# Patient Record
Sex: MALE | Race: WHITE | NOT HISPANIC OR LATINO
[De-identification: names, ages, dates, MRNs, and addresses within clinical notes are randomized per-mention and may not be internally consistent; named-entity substitution may affect disease eponyms.]

---

## 2017-07-31 ENCOUNTER — TRANSCRIPTION ENCOUNTER (OUTPATIENT)
Age: 52
End: 2017-07-31

## 2017-09-06 ENCOUNTER — EMERGENCY (EMERGENCY)
Facility: HOSPITAL | Age: 52
LOS: 0 days | Discharge: HOME | End: 2017-09-06
Admitting: INTERNAL MEDICINE

## 2017-09-06 DIAGNOSIS — S46.911A STRAIN OF UNSPECIFIED MUSCLE, FASCIA AND TENDON AT SHOULDER AND UPPER ARM LEVEL, RIGHT ARM, INITIAL ENCOUNTER: ICD-10-CM

## 2017-09-06 DIAGNOSIS — R42 DIZZINESS AND GIDDINESS: ICD-10-CM

## 2017-09-06 DIAGNOSIS — F41.9 ANXIETY DISORDER, UNSPECIFIED: ICD-10-CM

## 2017-09-06 DIAGNOSIS — I48.91 UNSPECIFIED ATRIAL FIBRILLATION: ICD-10-CM

## 2017-09-06 DIAGNOSIS — Z79.899 OTHER LONG TERM (CURRENT) DRUG THERAPY: ICD-10-CM

## 2017-09-06 DIAGNOSIS — G47.33 OBSTRUCTIVE SLEEP APNEA (ADULT) (PEDIATRIC): ICD-10-CM
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
## 2017-09-06 DIAGNOSIS — M25.511 PAIN IN RIGHT SHOULDER: ICD-10-CM
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
## 2017-09-06 DIAGNOSIS — Z87.891 PERSONAL HISTORY OF NICOTINE DEPENDENCE: ICD-10-CM
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
## 2018-02-24 ENCOUNTER — EMERGENCY (EMERGENCY)
Facility: HOSPITAL | Age: 53
LOS: 0 days | Discharge: HOME | End: 2018-02-24
Attending: EMERGENCY MEDICINE | Admitting: INTERNAL MEDICINE

## 2018-02-24 VITALS
RESPIRATION RATE: 18 BRPM | TEMPERATURE: 98 F | SYSTOLIC BLOOD PRESSURE: 144 MMHG | HEART RATE: 64 BPM | DIASTOLIC BLOOD PRESSURE: 74 MMHG | OXYGEN SATURATION: 98 %

## 2018-02-24 VITALS
TEMPERATURE: 98 F | HEART RATE: 72 BPM | OXYGEN SATURATION: 98 % | SYSTOLIC BLOOD PRESSURE: 146 MMHG | RESPIRATION RATE: 18 BRPM | DIASTOLIC BLOOD PRESSURE: 66 MMHG

## 2018-02-24 DIAGNOSIS — R55 SYNCOPE AND COLLAPSE: ICD-10-CM

## 2018-02-24 DIAGNOSIS — R42 DIZZINESS AND GIDDINESS: ICD-10-CM

## 2018-02-24 DIAGNOSIS — I48.91 UNSPECIFIED ATRIAL FIBRILLATION: ICD-10-CM

## 2018-02-24 DIAGNOSIS — G47.33 OBSTRUCTIVE SLEEP APNEA (ADULT) (PEDIATRIC): ICD-10-CM

## 2018-02-24 LAB
ALBUMIN SERPL ELPH-MCNC: 4.4 G/DL — SIGNIFICANT CHANGE UP (ref 3–5.5)
ALP SERPL-CCNC: 50 U/L — SIGNIFICANT CHANGE UP (ref 30–115)
ALT FLD-CCNC: 25 U/L — SIGNIFICANT CHANGE UP (ref 0–41)
ANION GAP SERPL CALC-SCNC: 7 MMOL/L — SIGNIFICANT CHANGE UP (ref 7–14)
APTT BLD: 26.3 SEC — LOW (ref 27–39.2)
AST SERPL-CCNC: 25 U/L — SIGNIFICANT CHANGE UP (ref 0–41)
BASOPHILS # BLD AUTO: 0.07 K/UL — SIGNIFICANT CHANGE UP (ref 0–0.2)
BASOPHILS NFR BLD AUTO: 0.6 % — SIGNIFICANT CHANGE UP (ref 0–1)
BILIRUB SERPL-MCNC: 0.8 MG/DL — SIGNIFICANT CHANGE UP (ref 0.2–1.2)
BUN SERPL-MCNC: 14 MG/DL — SIGNIFICANT CHANGE UP (ref 10–20)
CALCIUM SERPL-MCNC: 9.2 MG/DL — SIGNIFICANT CHANGE UP (ref 8.5–10.1)
CHLORIDE SERPL-SCNC: 105 MMOL/L — SIGNIFICANT CHANGE UP (ref 98–110)
CHOLEST SERPL-MCNC: 138 MG/DL — SIGNIFICANT CHANGE UP (ref 100–200)
CK MB BLD-MCNC: 3 % — SIGNIFICANT CHANGE UP (ref 0–4)
CK MB BLD-MCNC: 3 % — SIGNIFICANT CHANGE UP (ref 0–4)
CK MB CFR SERPL CALC: 6.3 NG/ML — SIGNIFICANT CHANGE UP (ref 0.6–6.3)
CK MB CFR SERPL CALC: 7.7 NG/ML — HIGH (ref 0.6–6.3)
CK SERPL-CCNC: 196 U/L — SIGNIFICANT CHANGE UP (ref 0–225)
CK SERPL-CCNC: 248 U/L — HIGH (ref 0–225)
CO2 SERPL-SCNC: 28 MMOL/L — SIGNIFICANT CHANGE UP (ref 17–32)
CREAT SERPL-MCNC: 0.8 MG/DL — SIGNIFICANT CHANGE UP (ref 0.7–1.5)
EOSINOPHIL # BLD AUTO: 0.15 K/UL — SIGNIFICANT CHANGE UP (ref 0–0.7)
EOSINOPHIL NFR BLD AUTO: 1.4 % — SIGNIFICANT CHANGE UP (ref 0–8)
GLUCOSE SERPL-MCNC: 116 MG/DL — HIGH (ref 70–110)
HCT VFR BLD CALC: 38.2 % — LOW (ref 42–52)
HDLC SERPL-MCNC: 28 MG/DL — LOW (ref 40–60)
HGB BLD-MCNC: 12.1 G/DL — LOW (ref 14–18)
IMM GRANULOCYTES NFR BLD AUTO: 0.6 % — HIGH (ref 0.1–0.3)
INR BLD: 1.09 RATIO — SIGNIFICANT CHANGE UP (ref 0.65–1.3)
LIPID PNL WITH DIRECT LDL SERPL: 102 MG/DL — HIGH (ref 50–100)
LYMPHOCYTES # BLD AUTO: 1.98 K/UL — SIGNIFICANT CHANGE UP (ref 1.2–3.4)
LYMPHOCYTES # BLD AUTO: 17.8 % — LOW (ref 20.5–51.1)
MAGNESIUM SERPL-MCNC: 2.2 MG/DL — SIGNIFICANT CHANGE UP (ref 1.8–2.4)
MCHC RBC-ENTMCNC: 20.3 PG — LOW (ref 27–31)
MCHC RBC-ENTMCNC: 31.7 G/DL — LOW (ref 32–37)
MCV RBC AUTO: 64.1 FL — LOW (ref 80–94)
MONOCYTES # BLD AUTO: 0.82 K/UL — HIGH (ref 0.1–0.6)
MONOCYTES NFR BLD AUTO: 7.4 % — SIGNIFICANT CHANGE UP (ref 1.7–9.3)
NEUTROPHILS # BLD AUTO: 8.02 K/UL — HIGH (ref 1.4–6.5)
NEUTROPHILS NFR BLD AUTO: 72.2 % — SIGNIFICANT CHANGE UP (ref 42.2–75.2)
PLATELET # BLD AUTO: 210 K/UL — SIGNIFICANT CHANGE UP (ref 130–400)
POTASSIUM SERPL-MCNC: 3.6 MMOL/L — SIGNIFICANT CHANGE UP (ref 3.5–5)
POTASSIUM SERPL-SCNC: 3.6 MMOL/L — SIGNIFICANT CHANGE UP (ref 3.5–5)
PROT SERPL-MCNC: 7.1 G/DL — SIGNIFICANT CHANGE UP (ref 6–8)
PROTHROM AB SERPL-ACNC: 11.8 SEC — SIGNIFICANT CHANGE UP (ref 9.95–12.87)
RBC # BLD: 5.96 M/UL — SIGNIFICANT CHANGE UP (ref 4.7–6.1)
RBC # FLD: 18.1 % — HIGH (ref 11.5–14.5)
SODIUM SERPL-SCNC: 140 MMOL/L — SIGNIFICANT CHANGE UP (ref 135–146)
TOTAL CHOLESTEROL/HDL RATIO MEASUREMENT: 4.9 RATIO — SIGNIFICANT CHANGE UP (ref 4–5.5)
TRIGL SERPL-MCNC: 89 MG/DL — SIGNIFICANT CHANGE UP (ref 40–150)
TROPONIN I SERPL-MCNC: <0.02 NG/ML — SIGNIFICANT CHANGE UP (ref 0–0.05)
TROPONIN I SERPL-MCNC: <0.02 NG/ML — SIGNIFICANT CHANGE UP (ref 0–0.05)
WBC # BLD: 11.11 K/UL — HIGH (ref 4.8–10.8)
WBC # FLD AUTO: 11.11 K/UL — HIGH (ref 4.8–10.8)

## 2018-02-24 NOTE — ED CDU PROVIDER DISPOSITION NOTE - CLINICAL COURSE
pt presented with syncope. Placed into observation for evaluation. While in observation pt was on continuous cardiac monitoring . Serial cardiac markers neg. Echo normal. Pt likely became orthostatic. Advised to follow up with Dr. Coulter. All reports reviewed with pt and given to pt. Hx of Thal trait and mild anemia. Pt advised of mild elevation of glucose. Will need wt loss and exercise. Pt will follow up pmd in one week.

## 2018-02-24 NOTE — ED ADULT NURSE REASSESSMENT NOTE - NS ED NURSE REASSESS COMMENT FT1
Received pt from main ED A/O times 4 Vs stable placed on cardiac monitor , denies no chest pain no SOB no headache or dizziness , ED attending made aware of pt status , on going nursing observation .
pt reassessed report filling slight better VS stable denies no chest pain no SOB no N.V ,ambulate steady , 2DECHO order is done ,pt is seen evaluate by ED attending clear to go home  ready to be D/C home with family members .
Patient placed in observation, denies any dizziness or symptoms at this time, states he feels tired. VSS. Will continue to monitor.

## 2018-02-24 NOTE — ED CDU PROVIDER INITIAL DAY NOTE - OBJECTIVE STATEMENT
51 y/o male with PMHX of Sleep Apnea on CPAP, A.Fib ( only on ASA 81mg), presenting for syncope. As per pt, had sexual intercourse, went up a light of stairs felt lightheaded and sat down. Pt states he doesn't remember. Syncope was witnessed by wife,  possible loss of consciousness for few seconds. Pt was in seated position, no head trauma.  Denies previous history of syncope, chest pain, SOB., visual changes

## 2018-02-24 NOTE — ED ADULT NURSE NOTE - CHPI ED SYMPTOMS NEG
no numbness/no pain/no chills/no decreased eating/drinking/no vomiting/no tingling/no nausea/no weakness/no fever

## 2018-02-24 NOTE — ED ADULT NURSE NOTE - OBJECTIVE STATEMENT
Pt AOx3, ambulatory, c/o dizziness after having sexual intercourse, pt denies LOC, syncope, no changes in denies taking any medications. No SOB, no acute distress noted.

## 2018-02-24 NOTE — ED PROVIDER NOTE - PHYSICAL EXAMINATION
Vital signs reviewed  GENERAL: Patient well appearing, NAD  HEAD: NCAT  ENT: MMM  RESPIRATORY: Normal respiratory effort. CTA B/L. No wheezing, rales, rhonchi  CARDIOVASCULAR: Regular rate and rhythm. Normal S1/S2. No murmurs, rubs or gallops.  ABDOMEN: Soft. Nondistended. Nontender. No guarding or rebound. No CVA tenderness.  MUSCULOSKELETAL/EXTREMITIES: Brisk cap refill. 2+ radial pulses. No leg edema.  SKIN:  Warm and dry.   NEURO: AAOx3. No gross FND. No ataxia.  PSYCHIATRIC: Cooperative. Affect appropriate. Insight and judgement normal. Memory intact. Thought normal.

## 2018-02-24 NOTE — ED PROVIDER NOTE - PROGRESS NOTE DETAILS
lis - After patient came back to ED, demanded that he be handed discharge papers so that he could go even before I could take history. Pt states he is only here because his wife called EMS and she is forcing him to see the doctor. Pt reluctant to give information and states he wants to be discharged. Pt was informed that he could either stay to be evaluated or leave AMA which he is unwilling to do. Currently willing to stay for workup of syncope. lis Ritter Pt signed out to Dr. Polanco. f/u cbc, reassess --> obs discussed labs and obs stay with pt, pt reluctant to stay in obs as he does not want to have a stress test that is not read by his primary cardiologist Dr. Dye but also does not want to sing out AMA and does not want admission.  After extensive discussion of risks and benefits, pt amenable to obs stay and eval including stress test. D/w pt that will consult Dr. Dye.  Will place in obs

## 2018-02-24 NOTE — ED PROVIDER NOTE - ATTENDING CONTRIBUTION TO CARE
pt s/p syncopal episode after intercourse. no cp, sob, leg pain, ab pain, n, v, sweats. no injury.  has no complaints in ED. pt in nad, ncat, perrl pink conj, neck sup, no bruits or thrills, ctab, s1s2, ab soft, nt. no LE edema or tenderness. will get ekg, labs, cxr, monitor.

## 2018-02-24 NOTE — ED PROVIDER NOTE - OBJECTIVE STATEMENT
51yo M with PMHx Afib (not on AC), p/w syncope. Pt states after having sexual intercourse, he climbed up a flight of stairs and felt lightheaded, sat down and had syncopal episode. Wife called EMS, pt remembers the lights and sirens. Denies shaking or seizure-like activity. Currently asymptomatic in the ED. Patient actually LWBS initially but returned to his bed after going outside to call wife, who refused to come pick him up until he saw a doctor. Denies fever, chills, headache, CP, SOB, cough, palpitations, nausea, vomiting, diarrhea, abd pain, leg swelling.

## 2018-02-24 NOTE — ED CDU PROVIDER INITIAL DAY NOTE - PMH
Atrial fibrillation, unspecified type    Sleep apnea with use of continuous positive airway pressure (CPAP)

## 2018-02-24 NOTE — ED PROVIDER NOTE - NS ED ROS FT
Constitutional: No fever, chills.  Eyes:  No visual changes  ENMT:  No neck pain  Cardiac:  No chest pain, palpitations  Respiratory:  No cough, SOB  GI:  No nausea, vomiting, diarrhea, abdominal pain.  MS:  No back pain.  Neuro: +lightheadedness. +syncope. No headache  Skin:  No skin rash  Endocrine: No history of thyroid disease or diabetes.  Except as documented in the HPI,  all other systems are negative.

## 2018-02-24 NOTE — ED CDU PROVIDER INITIAL DAY NOTE - ATTENDING CONTRIBUTION TO CARE
Pt after sex when out and then became lightheaded and then sat of chair>> syncope>> then remembers EMS. No chest pain. hx of afib 6 yrs ago>>cardioverted and no reoccurrence. Pt see Dr. Ellsworth of cardiology. Last echo one year ago. Will observe and echo.

## 2019-09-21 ENCOUNTER — EMERGENCY (EMERGENCY)
Facility: HOSPITAL | Age: 54
LOS: 0 days | Discharge: HOME | End: 2019-09-22
Attending: EMERGENCY MEDICINE | Admitting: EMERGENCY MEDICINE
Payer: COMMERCIAL

## 2019-09-21 VITALS
TEMPERATURE: 99 F | OXYGEN SATURATION: 95 % | SYSTOLIC BLOOD PRESSURE: 158 MMHG | DIASTOLIC BLOOD PRESSURE: 82 MMHG | RESPIRATION RATE: 18 BRPM | HEART RATE: 79 BPM

## 2019-09-21 VITALS
HEART RATE: 71 BPM | TEMPERATURE: 99 F | SYSTOLIC BLOOD PRESSURE: 153 MMHG | DIASTOLIC BLOOD PRESSURE: 70 MMHG | RESPIRATION RATE: 18 BRPM | OXYGEN SATURATION: 96 %
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
## 2019-09-21 DIAGNOSIS — S73.101A UNSPECIFIED SPRAIN OF RIGHT HIP, INITIAL ENCOUNTER: ICD-10-CM

## 2019-09-21 DIAGNOSIS — M16.11 UNILATERAL PRIMARY OSTEOARTHRITIS, RIGHT HIP: ICD-10-CM
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
## 2019-09-21 DIAGNOSIS — M25.559 PAIN IN UNSPECIFIED HIP: ICD-10-CM
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
## 2019-09-21 LAB
ALBUMIN SERPL ELPH-MCNC: 4.4 G/DL — SIGNIFICANT CHANGE UP (ref 3.5–5.2)
ALP SERPL-CCNC: 65 U/L — SIGNIFICANT CHANGE UP (ref 30–115)
ALT FLD-CCNC: 56 U/L — HIGH (ref 0–41)
ANION GAP SERPL CALC-SCNC: 12 MMOL/L — SIGNIFICANT CHANGE UP (ref 7–14)
APPEARANCE UR: CLEAR — SIGNIFICANT CHANGE UP
AST SERPL-CCNC: 61 U/L — HIGH (ref 0–41)
BASOPHILS # BLD AUTO: 0.04 K/UL — SIGNIFICANT CHANGE UP (ref 0–0.2)
BASOPHILS NFR BLD AUTO: 0.4 % — SIGNIFICANT CHANGE UP (ref 0–1)
BILIRUB SERPL-MCNC: 1.5 MG/DL — HIGH (ref 0.2–1.2)
BILIRUB UR-MCNC: NEGATIVE — SIGNIFICANT CHANGE UP
BUN SERPL-MCNC: 10 MG/DL — SIGNIFICANT CHANGE UP (ref 10–20)
CALCIUM SERPL-MCNC: 8.8 MG/DL — SIGNIFICANT CHANGE UP (ref 8.5–10.1)
CHLORIDE SERPL-SCNC: 101 MMOL/L — SIGNIFICANT CHANGE UP (ref 98–110)
CO2 SERPL-SCNC: 24 MMOL/L — SIGNIFICANT CHANGE UP (ref 17–32)
COLOR SPEC: YELLOW — SIGNIFICANT CHANGE UP
CREAT SERPL-MCNC: 0.8 MG/DL — SIGNIFICANT CHANGE UP (ref 0.7–1.5)
DIFF PNL FLD: NEGATIVE — SIGNIFICANT CHANGE UP
EOSINOPHIL # BLD AUTO: 0.09 K/UL — SIGNIFICANT CHANGE UP (ref 0–0.7)
EOSINOPHIL NFR BLD AUTO: 0.8 % — SIGNIFICANT CHANGE UP (ref 0–8)
GLUCOSE SERPL-MCNC: 104 MG/DL — HIGH (ref 70–99)
GLUCOSE UR QL: NEGATIVE — SIGNIFICANT CHANGE UP
HCT VFR BLD CALC: 38.2 % — LOW (ref 42–52)
HGB BLD-MCNC: 11.7 G/DL — LOW (ref 14–18)
IMM GRANULOCYTES NFR BLD AUTO: 0.6 % — HIGH (ref 0.1–0.3)
KETONES UR-MCNC: NEGATIVE — SIGNIFICANT CHANGE UP
LEUKOCYTE ESTERASE UR-ACNC: NEGATIVE — SIGNIFICANT CHANGE UP
LYMPHOCYTES # BLD AUTO: 1.98 K/UL — SIGNIFICANT CHANGE UP (ref 1.2–3.4)
LYMPHOCYTES # BLD AUTO: 18.7 % — LOW (ref 20.5–51.1)
MCHC RBC-ENTMCNC: 20.3 PG — LOW (ref 27–31)
MCHC RBC-ENTMCNC: 30.6 G/DL — LOW (ref 32–37)
MCV RBC AUTO: 66.3 FL — LOW (ref 80–94)
MONOCYTES # BLD AUTO: 0.76 K/UL — HIGH (ref 0.1–0.6)
MONOCYTES NFR BLD AUTO: 7.2 % — SIGNIFICANT CHANGE UP (ref 1.7–9.3)
NEUTROPHILS # BLD AUTO: 7.66 K/UL — HIGH (ref 1.4–6.5)
NEUTROPHILS NFR BLD AUTO: 72.3 % — SIGNIFICANT CHANGE UP (ref 42.2–75.2)
NITRITE UR-MCNC: NEGATIVE — SIGNIFICANT CHANGE UP
NRBC # BLD: 0 /100 WBCS — SIGNIFICANT CHANGE UP (ref 0–0)
PH UR: 6 — SIGNIFICANT CHANGE UP (ref 5–8)
PLATELET # BLD AUTO: 204 K/UL — SIGNIFICANT CHANGE UP (ref 130–400)
POTASSIUM SERPL-MCNC: 4.1 MMOL/L — SIGNIFICANT CHANGE UP (ref 3.5–5)
POTASSIUM SERPL-SCNC: 4.1 MMOL/L — SIGNIFICANT CHANGE UP (ref 3.5–5)
PROT SERPL-MCNC: 7.4 G/DL — SIGNIFICANT CHANGE UP (ref 6–8)
PROT UR-MCNC: SIGNIFICANT CHANGE UP
RBC # BLD: 5.76 M/UL — SIGNIFICANT CHANGE UP (ref 4.7–6.1)
RBC # FLD: 16.2 % — HIGH (ref 11.5–14.5)
SODIUM SERPL-SCNC: 137 MMOL/L — SIGNIFICANT CHANGE UP (ref 135–146)
SP GR SPEC: 1.02 — SIGNIFICANT CHANGE UP (ref 1.01–1.02)
UROBILINOGEN FLD QL: ABNORMAL
WBC # BLD: 10.59 K/UL — SIGNIFICANT CHANGE UP (ref 4.8–10.8)
WBC # FLD AUTO: 10.59 K/UL — SIGNIFICANT CHANGE UP (ref 4.8–10.8)

## 2019-09-21 PROCEDURE — 71045 X-RAY EXAM CHEST 1 VIEW: CPT | Mod: 26

## 2019-09-21 PROCEDURE — 73501 X-RAY EXAM HIP UNI 1 VIEW: CPT | Mod: 26,RT

## 2019-09-21 PROCEDURE — 99284 EMERGENCY DEPT VISIT MOD MDM: CPT

## 2019-09-21 RX ORDER — ONDANSETRON 8 MG/1
4 TABLET, FILM COATED ORAL ONCE
Refills: 0 | Status: COMPLETED | OUTPATIENT
Start: 2019-09-21 | End: 2019-09-21

## 2019-09-21 RX ORDER — TIZANIDINE 4 MG/1
1 TABLET ORAL
Qty: 7 | Refills: 0
Start: 2019-09-21 | End: 2019-09-28

## 2019-09-21 RX ORDER — TIZANIDINE 4 MG/1
1 TABLET ORAL
Qty: 7 | Refills: 0
Start: 2019-09-21 | End: 2019-09-27

## 2019-09-21 RX ORDER — MORPHINE SULFATE 50 MG/1
8 CAPSULE, EXTENDED RELEASE ORAL ONCE
Refills: 0 | Status: DISCONTINUED | OUTPATIENT
Start: 2019-09-21 | End: 2019-09-21

## 2019-09-21 RX ORDER — IBUPROFEN 200 MG
1 TABLET ORAL
Qty: 21 | Refills: 0
Start: 2019-09-21 | End: 2019-09-27

## 2019-09-21 RX ORDER — IBUPROFEN 200 MG
1 TABLET ORAL
Qty: 21 | Refills: 0
Start: 2019-09-21 | End: 2019-09-28

## 2019-09-21 RX ORDER — DIAZEPAM 5 MG
5 TABLET ORAL ONCE
Refills: 0 | Status: DISCONTINUED | OUTPATIENT
Start: 2019-09-21 | End: 2019-09-21

## 2019-09-21 RX ORDER — KETOROLAC TROMETHAMINE 30 MG/ML
30 SYRINGE (ML) INJECTION ONCE
Refills: 0 | Status: DISCONTINUED | OUTPATIENT
Start: 2019-09-21 | End: 2019-09-21

## 2019-09-21 RX ADMIN — Medication 5 MILLIGRAM(S): at 21:38

## 2019-09-21 RX ADMIN — Medication 30 MILLIGRAM(S): at 21:38

## 2019-09-21 RX ADMIN — ONDANSETRON 4 MILLIGRAM(S): 8 TABLET, FILM COATED ORAL at 20:40

## 2019-09-21 RX ADMIN — MORPHINE SULFATE 8 MILLIGRAM(S): 50 CAPSULE, EXTENDED RELEASE ORAL at 20:40

## 2019-09-21 NOTE — ED ADULT NURSE NOTE - NSIMPLEMENTINTERV_GEN_ALL_ED
Implemented All Universal Safety Interventions:  [place] to call system. Call bell, personal items and telephone within reach. Instruct patient to call for assistance. Room bathroom lighting operational. Non-slip footwear when patient is off stretcher. Physically safe environment: no spills, clutter or unnecessary equipment. Stretcher in lowest position, wheels locked, appropriate side rails in place.

## 2019-09-21 NOTE — ED PROVIDER NOTE - OBJECTIVE STATEMENT
55 yo M with hx of kidney stones, sciatica, presents for evaluation of right hip pain, onset 3 days ago, associated with pain when laying on that side. No fever, no chills, no headache, no nausea, no vomiting, no chest pain, no back pain, no abdominal pain, no SOB, no loc. Pt states that he went to work and after work 3 days ago, he started having the pain. Pt denies any other symptoms.

## 2019-09-21 NOTE — ED PROVIDER NOTE - ATTENDING CONTRIBUTION TO CARE
55 yo m with pmh of 53 yo m with pmh of sciatica, kidney stones, presents with right hip pain x 3 days ago.  pt says worse when laying on the right side and while walking.  no urinary sx.  no abd pain, no cp, no sob.  exam: nad, ncat, perrl, eomi, mmm, rrr, ctab, abd soft, nt,nd, R hip ttp but FROM imp pt with msk pain, will xray, labs, symptomatic tx, reassess

## 2019-09-21 NOTE — ED PROVIDER NOTE - MUSCULOSKELETAL, MLM
Spine appears normal, range of motion is not limited, there is right hip point tenderness to the femoral head, negative straight leg raise, 2 + distal PT and DP pulses, warm feet, no edema Spine appears normal, range of motion is not limited, no saddle anesthesia, there is right hip point tenderness to the femoral head, negative straight leg raise, 2 + distal PT and DP pulses, warm feet, no edema

## 2019-09-21 NOTE — ED PROVIDER NOTE - PATIENT PORTAL LINK FT
You can access the FollowMyHealth Patient Portal offered by [facility] by registering at the following website: [email or web address]. By joining [employer]’s FollowMyHealth portal, you will also be able to view your health information using other applications (apps) compatible with our system.

## 2019-09-21 NOTE — ED PROVIDER NOTE - CARE PROVIDERS DIRECT ADDRESSES
[email or web address]

## 2019-09-21 NOTE — ED PROVIDER NOTE - PROGRESS NOTE DETAILS
Pt feeling much better after muscle relaxant, pending urine Pt ambulating in the ED. Discussed need to follow up with PMD. Discussed signs and symptoms to return to the ED for. Pt understands and agrees with discharge plan

## 2019-09-21 NOTE — ED PROVIDER NOTE - CARE PROVIDER_API CALL
Damion Cohen)  Internal Medicine  [address] [place] [address]  Phone: [phone]  Fax: [phone]  Follow Up Time: 4-6 Days

## 2019-09-21 NOTE — ED PROVIDER NOTE - CONSTITUTIONAL, MLM
normal... Well appearing, well nourished, awake, alert, oriented to person, place, time/situation, in mild distress secondary to pain

## 2019-09-21 NOTE — ED ADULT NURSE NOTE - NURSING MUSC JOINTS
Mom reports fever today. Asthma flare up today. Tylenol given around 3 PM today.     no pain, swelling or deformity of joints

## 2019-09-21 NOTE — ED ADULT NURSE NOTE - OBJECTIVE STATEMENT
patient c/o sharp constant pain shooting down right leg. denies incontinence of bowel or bladder. VSS upon arrival.

## 2019-09-22 ENCOUNTER — INPATIENT (INPATIENT)
Facility: HOSPITAL | Age: 54
LOS: 0 days | Discharge: HOME | End: 2019-09-23
Attending: INTERNAL MEDICINE | Admitting: INTERNAL MEDICINE
Payer: COMMERCIAL

## 2019-09-22 VITALS
RESPIRATION RATE: 18 BRPM | HEART RATE: 80 BPM | TEMPERATURE: 100 F | OXYGEN SATURATION: 96 % | DIASTOLIC BLOOD PRESSURE: 72 MMHG | SYSTOLIC BLOOD PRESSURE: 155 MMHG

## 2019-09-22 PROBLEM — I48.91 UNSPECIFIED ATRIAL FIBRILLATION: Chronic | Status: ACTIVE | Noted: 2018-02-24

## 2019-09-22 PROBLEM — G47.30 SLEEP APNEA, UNSPECIFIED: Chronic | Status: ACTIVE | Noted: 2018-02-24

## 2019-09-22 LAB
ALBUMIN SERPL ELPH-MCNC: 4.2 G/DL — SIGNIFICANT CHANGE UP (ref 3.5–5.2)
ALP SERPL-CCNC: 62 U/L — SIGNIFICANT CHANGE UP (ref 30–115)
ALT FLD-CCNC: 58 U/L — HIGH (ref 0–41)
ANION GAP SERPL CALC-SCNC: 11 MMOL/L — SIGNIFICANT CHANGE UP (ref 7–14)
AST SERPL-CCNC: 60 U/L — HIGH (ref 0–41)
BASOPHILS # BLD AUTO: 0.04 K/UL — SIGNIFICANT CHANGE UP (ref 0–0.2)
BASOPHILS NFR BLD AUTO: 0.4 % — SIGNIFICANT CHANGE UP (ref 0–1)
BILIRUB SERPL-MCNC: 0.9 MG/DL — SIGNIFICANT CHANGE UP (ref 0.2–1.2)
BUN SERPL-MCNC: 15 MG/DL — SIGNIFICANT CHANGE UP (ref 10–20)
CALCIUM SERPL-MCNC: 8.7 MG/DL — SIGNIFICANT CHANGE UP (ref 8.5–10.1)
CHLORIDE SERPL-SCNC: 102 MMOL/L — SIGNIFICANT CHANGE UP (ref 98–110)
CO2 SERPL-SCNC: 27 MMOL/L — SIGNIFICANT CHANGE UP (ref 17–32)
CREAT SERPL-MCNC: 0.9 MG/DL — SIGNIFICANT CHANGE UP (ref 0.7–1.5)
EOSINOPHIL # BLD AUTO: 0.14 K/UL — SIGNIFICANT CHANGE UP (ref 0–0.7)
EOSINOPHIL NFR BLD AUTO: 1.6 % — SIGNIFICANT CHANGE UP (ref 0–8)
ERYTHROCYTE [SEDIMENTATION RATE] IN BLOOD: 34 MM/HR — HIGH (ref 0–10)
GLUCOSE SERPL-MCNC: 105 MG/DL — HIGH (ref 70–99)
HCT VFR BLD CALC: 36.4 % — LOW (ref 42–52)
HGB BLD-MCNC: 11.3 G/DL — LOW (ref 14–18)
IMM GRANULOCYTES NFR BLD AUTO: 0.6 % — HIGH (ref 0.1–0.3)
LACTATE SERPL-SCNC: 1.5 MMOL/L — SIGNIFICANT CHANGE UP (ref 0.5–2.2)
LYMPHOCYTES # BLD AUTO: 1.46 K/UL — SIGNIFICANT CHANGE UP (ref 1.2–3.4)
LYMPHOCYTES # BLD AUTO: 16.3 % — LOW (ref 20.5–51.1)
MCHC RBC-ENTMCNC: 20.7 PG — LOW (ref 27–31)
MCHC RBC-ENTMCNC: 31 G/DL — LOW (ref 32–37)
MCV RBC AUTO: 66.5 FL — LOW (ref 80–94)
MONOCYTES # BLD AUTO: 0.76 K/UL — HIGH (ref 0.1–0.6)
MONOCYTES NFR BLD AUTO: 8.5 % — SIGNIFICANT CHANGE UP (ref 1.7–9.3)
NEUTROPHILS # BLD AUTO: 6.52 K/UL — HIGH (ref 1.4–6.5)
NEUTROPHILS NFR BLD AUTO: 72.6 % — SIGNIFICANT CHANGE UP (ref 42.2–75.2)
NRBC # BLD: 0 /100 WBCS — SIGNIFICANT CHANGE UP (ref 0–0)
PLATELET # BLD AUTO: 176 K/UL — SIGNIFICANT CHANGE UP (ref 130–400)
POTASSIUM SERPL-MCNC: 4.1 MMOL/L — SIGNIFICANT CHANGE UP (ref 3.5–5)
POTASSIUM SERPL-SCNC: 4.1 MMOL/L — SIGNIFICANT CHANGE UP (ref 3.5–5)
PROT SERPL-MCNC: 7 G/DL — SIGNIFICANT CHANGE UP (ref 6–8)
RBC # BLD: 5.47 M/UL — SIGNIFICANT CHANGE UP (ref 4.7–6.1)
RBC # FLD: 16.2 % — HIGH (ref 11.5–14.5)
SODIUM SERPL-SCNC: 140 MMOL/L — SIGNIFICANT CHANGE UP (ref 135–146)
WBC # BLD: 8.97 K/UL — SIGNIFICANT CHANGE UP (ref 4.8–10.8)
WBC # FLD AUTO: 8.97 K/UL — SIGNIFICANT CHANGE UP (ref 4.8–10.8)

## 2019-09-22 PROCEDURE — 72192 CT PELVIS W/O DYE: CPT | Mod: 26

## 2019-09-22 PROCEDURE — 99285 EMERGENCY DEPT VISIT HI MDM: CPT

## 2019-09-22 RX ORDER — DIAZEPAM 5 MG
5 TABLET ORAL ONCE
Refills: 0 | Status: DISCONTINUED | OUTPATIENT
Start: 2019-09-22 | End: 2019-09-22

## 2019-09-22 RX ORDER — KETOROLAC TROMETHAMINE 30 MG/ML
15 SYRINGE (ML) INJECTION ONCE
Refills: 0 | Status: DISCONTINUED | OUTPATIENT
Start: 2019-09-22 | End: 2019-09-22

## 2019-09-22 RX ADMIN — Medication 15 MILLIGRAM(S): at 22:30

## 2019-09-22 RX ADMIN — Medication 5 MILLIGRAM(S): at 21:59

## 2019-09-22 RX ADMIN — Medication 15 MILLIGRAM(S): at 21:57

## 2019-09-22 NOTE — ED PROVIDER NOTE - NS ED ROS FT
Review of Systems:  	•	CONSTITUTIONAL - no fever, no diaphoresis  	•	SKIN - no rash, no lesions  	•	HEMATOLOGIC - no bleeding, no bruising  	•	EYES - no discharge, no injection  	•	ENT - no otorrhea, no rhinorrhea  	•	RESPIRATORY - no shortness of breath, no cough  	•	CARDIAC - no chest pain, no palpitations  	•	GI - no abd pain, no nausea, no vomiting, no diarrhea, no constipation, no bleeding  	•	GENITO-URINARY -  no dysuria, no hematuria  	•	ENDO - no polydypsia, no polyurea, no heat/no cold intolerance  	•	MUSCULOSKELETAL - +joint paint, no swelling, no redness  	•	NEUROLOGIC - no weakness, no headache, no anesthesia, no paresthesias

## 2019-09-22 NOTE — ED ADULT TRIAGE NOTE - CHIEF COMPLAINT QUOTE
pt with c/o right hip pain, was in er last night , no improvement with medication, difficulty ambulating.

## 2019-09-22 NOTE — CONSULT NOTE ADULT - SUBJECTIVE AND OBJECTIVE BOX
HPI: Pt is a 55 y/o M who initially presented yesterday with new onset right hip pain. Was given dose of toradol which improved pain and was discharged home. Returns today with increased pain and difficulty walking. Denies trauma. States has had sciatica and somewhat similar pain in past. denies fevers/chills Pt states has pain that radiates down entire lateral aspect of leg.    PMH: Afib, nephrolithiasis, sciatica  PSH: None  All: NKDA  Meds: Ecotrin, cardizem    AFVSS    PE  NAD  Respirations non labored  Appropriate mood and affect    RLE  Skin iintact  TTP over greater trochanter  Non ttp in groin  Non ttp over SI joint  Pain over GT with internal rotation  Non ttp rest of extremity  EHL/FHL/TA/GS motor intact  SILT T/DP/SP  2+ DP pulse  Able to perform straight leg raise  AROM FF 45, ER/IR 15    Imaging: Plain radiographs and CT demonstrate no acute findings    A/P  54 t/o M with both exacerbation of sciatica and trochanteric bursitis  -Recommended continue with anti-inflamatory  Recommend toradol ATC while at hospital and Mobic 7.5 mg PO daily x14 days if discharged  Recommend follow up with both orthopedics and spine surgery as an outpatient  -No acute orthopedic intervention  -No concern for septic arthritis at this time  -WBAT RLE

## 2019-09-22 NOTE — ED PROVIDER NOTE - ATTENDING CONTRIBUTION TO CARE
55 yo M pmh of a fib on asprin, htn presents with right hip pain. States that 4 days ago he acutely started to have right hip pain that has been worsening. no truama or unusual activity. Yesterday started to have low grade fever. Came to the ED and had normal xray, labs and ua. Was offered admission for rehab but decided to go home. States that today the pain worsened and he was unable to walk or get out of bed so came back to the ed. no numbness, tingling or weakness. + low grade fever. no cough, no urinary symptoms. no cp, no sob, no n/v/d, no abdominal pain.     CONSTITUTIONAL: Well-developed; well-nourished; in no acute distress.   SKIN: warm, dry. no ecchymosis   HEAD: Normocephalic; atraumatic.  EYES: PERRL, EOMI, no conjunctival erythema  ENT: No nasal discharge; airway clear.  NECK: Supple; non tender.  CARD: S1, S2 normal;  Regular rate and rhythm.   RESP: No wheezes, rales or rhonchi.  ABD: soft non tender, non distended, no rebound or guarding  EXT: Normal ROM.  + tenderness over right lateral hip, limited range of motion due to pain. 4/5 strength.   LYMPH: No acute cervical adenopathy.  NEURO: Alert, oriented, grossly unremarkable. neurovascularly intact

## 2019-09-22 NOTE — ED PROVIDER NOTE - OBJECTIVE STATEMENT
54yM pmhx sciatica, kidney stones accompanied by wife c/o RT hip pain x4 days, worsening; states he was at work when pain came on suddenly, atraumatic, difficulty w/ flexion and extension but abduction is fine, unable to bear weight on hip; states he has had pain to the area for months but it suddenly got worse; was evaluated yesterday in ED, hip xray neg, no relief w/ morphine but did report relief w/ toradol + valium, given the option of admission and placement in rehab but tried outpt therapy which did not work, states he did not fill rx that was given from ED yest; associated w/ low-grade fever. Denies chills/sweats, n/v/d, redness to area.

## 2019-09-22 NOTE — ED ADULT NURSE REASSESSMENT NOTE - NS ED NURSE REASSESS COMMENT FT1
Pt awaiting ct result for right hip, then possible admit as per MD. Pt cooperative with plan of care. No complains of pain/discomfort at this time.

## 2019-09-22 NOTE — ED PROVIDER NOTE - CLINICAL SUMMARY MEDICAL DECISION MAKING FREE TEXT BOX
Patient was signed out to me (Dr. Rossi) by Dr. Bhatt. 53yo M presents with right hip pain, atraumatic, for past 4 days. Here yesterday for similar, had negative hip xrays, offered admission for PT/rehab but pt refused. Returns today stating outpatient meds not working and unable to walk 2/2 pain. CT hip showing clacific tendinosis. NV intact. Ortho consulted for subjective fevers with hip pain, r/o septic joint, cleared by ortho, no acute intervention. Several rounds of pain medications without adequate control of pain, pt still unable to ambulate. Will admit. Patient was signed out to me (Dr. Rossi) by Dr. Bhatt. 55yo M presents with right hip pain, atraumatic, for past 4 days. Here yesterday for similar, had negative hip xrays, offered admission for PT/rehab but pt refused. Returns today stating outpatient meds not working and unable to walk 2/2 pain. CT hip showing calcific tendinosis. NV intact. Ortho consulted for low grade temp with hip pain, r/o septic joint, cleared by ortho, no acute intervention. Several rounds of pain medications without adequate control of pain, pt still unable to ambulate. Will admit.

## 2019-09-22 NOTE — ED PROVIDER NOTE - PHYSICAL EXAMINATION
GEN: alert, NAD, uncomfortable appearing  HEAD:  normocephalic, atraumatic  EYES:  conjunctivae without injection, drainage or discharge  ENMT:  tympanic membranes pearly gray with normal landmarks; nasal mucosa moist; mouth moist without ulcerations or lesions; throat moist without erythema, exudate, ulcerations or lesions  NECK:  supple, no masses, no significant lymphadenopathy  CARDIAC:  regular rate and rhythm, normal S1 and S2, no murmurs, rubs or gallops  RESP:  respiratory rate and effort appear normal for age; lungs are clear to auscultation bilaterally; no rales or wheezes  ABDOMEN:  soft, nontender, nondistended, no masses, no organomegaly  LYMPHATICS:  no significant lymphadenopathy  MUSCULOSKELETAL/NEURO:  +RT hip tender to palpation lateral to great trocanter, significant pain w/ flexion and extension; normal movement, normal tone  SKIN:  normal skin color for age and race, well-perfused; warm and dry

## 2019-09-22 NOTE — ED PROVIDER NOTE - PROGRESS NOTE DETAILS
r/o septic arthritis ortho paged Spoke to ortho nely, will see pt Pt seen at bedside w/ ortho; don't suspect septic joint, recommend pt to get mobic and f/u ortho and spine.

## 2019-09-22 NOTE — ED PROVIDER NOTE - PMH
Atrial fibrillation, unspecified type    Sciatica    Sleep apnea with use of continuous positive airway pressure (CPAP)

## 2019-09-22 NOTE — ED ADULT NURSE NOTE - OBJECTIVE STATEMENT
Pt is a 55 y/o male complaining of pain and limited ROM to right hip x2 days. Pt also complaining of difficulty ambulating on RLE x3 months, denies accident/injury. Pt states he has hx sciatica. Pt with fever x1 day, tmax 100.3F. Pt denies chills/n/v/d. Pt states he was in ED yesterday and had x ray of right hip, complains of worsening pain to hip now.

## 2019-09-23 ENCOUNTER — TRANSCRIPTION ENCOUNTER (OUTPATIENT)
Age: 54
End: 2019-09-23

## 2019-09-23 VITALS
DIASTOLIC BLOOD PRESSURE: 78 MMHG | SYSTOLIC BLOOD PRESSURE: 136 MMHG | RESPIRATION RATE: 18 BRPM | TEMPERATURE: 98 F | OXYGEN SATURATION: 98 % | HEART RATE: 63 BPM

## 2019-09-23 LAB
ALBUMIN SERPL ELPH-MCNC: 4.3 G/DL — SIGNIFICANT CHANGE UP (ref 3.5–5.2)
ALP SERPL-CCNC: 63 U/L — SIGNIFICANT CHANGE UP (ref 30–115)
ALT FLD-CCNC: 52 U/L — HIGH (ref 0–41)
ANION GAP SERPL CALC-SCNC: 13 MMOL/L — SIGNIFICANT CHANGE UP (ref 7–14)
AST SERPL-CCNC: 51 U/L — HIGH (ref 0–41)
BASOPHILS # BLD AUTO: 0.04 K/UL — SIGNIFICANT CHANGE UP (ref 0–0.2)
BASOPHILS NFR BLD AUTO: 0.5 % — SIGNIFICANT CHANGE UP (ref 0–1)
BILIRUB SERPL-MCNC: 1 MG/DL — SIGNIFICANT CHANGE UP (ref 0.2–1.2)
BUN SERPL-MCNC: 14 MG/DL — SIGNIFICANT CHANGE UP (ref 10–20)
CALCIUM SERPL-MCNC: 9 MG/DL — SIGNIFICANT CHANGE UP (ref 8.5–10.1)
CHLORIDE SERPL-SCNC: 100 MMOL/L — SIGNIFICANT CHANGE UP (ref 98–110)
CO2 SERPL-SCNC: 26 MMOL/L — SIGNIFICANT CHANGE UP (ref 17–32)
CREAT SERPL-MCNC: 0.7 MG/DL — SIGNIFICANT CHANGE UP (ref 0.7–1.5)
EOSINOPHIL # BLD AUTO: 0.15 K/UL — SIGNIFICANT CHANGE UP (ref 0–0.7)
EOSINOPHIL NFR BLD AUTO: 2 % — SIGNIFICANT CHANGE UP (ref 0–8)
GLUCOSE SERPL-MCNC: 113 MG/DL — HIGH (ref 70–99)
HCT VFR BLD CALC: 36.5 % — LOW (ref 42–52)
HGB BLD-MCNC: 11.3 G/DL — LOW (ref 14–18)
IMM GRANULOCYTES NFR BLD AUTO: 0.5 % — HIGH (ref 0.1–0.3)
IRON SATN MFR SERPL: 31 % — SIGNIFICANT CHANGE UP (ref 15–50)
IRON SATN MFR SERPL: 80 UG/DL — SIGNIFICANT CHANGE UP (ref 35–150)
LYMPHOCYTES # BLD AUTO: 1.51 K/UL — SIGNIFICANT CHANGE UP (ref 1.2–3.4)
LYMPHOCYTES # BLD AUTO: 19.9 % — LOW (ref 20.5–51.1)
MAGNESIUM SERPL-MCNC: 2.1 MG/DL — SIGNIFICANT CHANGE UP (ref 1.8–2.4)
MCHC RBC-ENTMCNC: 20.8 PG — LOW (ref 27–31)
MCHC RBC-ENTMCNC: 31 G/DL — LOW (ref 32–37)
MCV RBC AUTO: 67.1 FL — LOW (ref 80–94)
MONOCYTES # BLD AUTO: 0.59 K/UL — SIGNIFICANT CHANGE UP (ref 0.1–0.6)
MONOCYTES NFR BLD AUTO: 7.8 % — SIGNIFICANT CHANGE UP (ref 1.7–9.3)
NEUTROPHILS # BLD AUTO: 5.26 K/UL — SIGNIFICANT CHANGE UP (ref 1.4–6.5)
NEUTROPHILS NFR BLD AUTO: 69.3 % — SIGNIFICANT CHANGE UP (ref 42.2–75.2)
NRBC # BLD: 0 /100 WBCS — SIGNIFICANT CHANGE UP (ref 0–0)
PLATELET # BLD AUTO: 183 K/UL — SIGNIFICANT CHANGE UP (ref 130–400)
POTASSIUM SERPL-MCNC: 3.7 MMOL/L — SIGNIFICANT CHANGE UP (ref 3.5–5)
POTASSIUM SERPL-SCNC: 3.7 MMOL/L — SIGNIFICANT CHANGE UP (ref 3.5–5)
PROT SERPL-MCNC: 7.1 G/DL — SIGNIFICANT CHANGE UP (ref 6–8)
RBC # BLD: 5.44 M/UL — SIGNIFICANT CHANGE UP (ref 4.7–6.1)
RBC # FLD: 16.3 % — HIGH (ref 11.5–14.5)
SODIUM SERPL-SCNC: 139 MMOL/L — SIGNIFICANT CHANGE UP (ref 135–146)
TIBC SERPL-MCNC: 257 UG/DL — SIGNIFICANT CHANGE UP (ref 220–430)
TRANSFERRIN SERPL-MCNC: 221 MG/DL — SIGNIFICANT CHANGE UP (ref 200–360)
UIBC SERPL-MCNC: 177 UG/DL — SIGNIFICANT CHANGE UP (ref 110–370)
WBC # BLD: 7.59 K/UL — SIGNIFICANT CHANGE UP (ref 4.8–10.8)
WBC # FLD AUTO: 7.59 K/UL — SIGNIFICANT CHANGE UP (ref 4.8–10.8)

## 2019-09-23 PROCEDURE — 99235 HOSP IP/OBS SAME DATE MOD 70: CPT

## 2019-09-23 RX ORDER — DIAZEPAM 5 MG
10 TABLET ORAL DAILY
Refills: 0 | Status: DISCONTINUED | OUTPATIENT
Start: 2019-09-23 | End: 2019-09-23

## 2019-09-23 RX ORDER — ENOXAPARIN SODIUM 100 MG/ML
40 INJECTION SUBCUTANEOUS DAILY
Refills: 0 | Status: DISCONTINUED | OUTPATIENT
Start: 2019-09-23 | End: 2019-09-23

## 2019-09-23 RX ORDER — PANTOPRAZOLE SODIUM 20 MG/1
1 TABLET, DELAYED RELEASE ORAL
Qty: 30 | Refills: 0
Start: 2019-09-23 | End: 2019-10-22

## 2019-09-23 RX ORDER — ASPIRIN/CALCIUM CARB/MAGNESIUM 324 MG
81 TABLET ORAL DAILY
Refills: 0 | Status: DISCONTINUED | OUTPATIENT
Start: 2019-09-23 | End: 2019-09-23

## 2019-09-23 RX ORDER — MORPHINE SULFATE 50 MG/1
6 CAPSULE, EXTENDED RELEASE ORAL ONCE
Refills: 0 | Status: DISCONTINUED | OUTPATIENT
Start: 2019-09-23 | End: 2019-09-23

## 2019-09-23 RX ORDER — MORPHINE SULFATE 50 MG/1
4 CAPSULE, EXTENDED RELEASE ORAL EVERY 6 HOURS
Refills: 0 | Status: DISCONTINUED | OUTPATIENT
Start: 2019-09-23 | End: 2019-09-23

## 2019-09-23 RX ORDER — DILTIAZEM HCL 120 MG
120 CAPSULE, EXT RELEASE 24 HR ORAL DAILY
Refills: 0 | Status: DISCONTINUED | OUTPATIENT
Start: 2019-09-23 | End: 2019-09-23

## 2019-09-23 RX ORDER — KETOROLAC TROMETHAMINE 30 MG/ML
30 SYRINGE (ML) INJECTION EVERY 6 HOURS
Refills: 0 | Status: DISCONTINUED | OUTPATIENT
Start: 2019-09-23 | End: 2019-09-23

## 2019-09-23 RX ORDER — PANTOPRAZOLE SODIUM 20 MG/1
40 TABLET, DELAYED RELEASE ORAL
Refills: 0 | Status: DISCONTINUED | OUTPATIENT
Start: 2019-09-23 | End: 2019-09-23

## 2019-09-23 RX ORDER — MELOXICAM 15 MG/1
1 TABLET ORAL
Qty: 14 | Refills: 0
Start: 2019-09-23 | End: 2019-10-06

## 2019-09-23 RX ADMIN — ENOXAPARIN SODIUM 40 MILLIGRAM(S): 100 INJECTION SUBCUTANEOUS at 12:12

## 2019-09-23 RX ADMIN — Medication 30 MILLIGRAM(S): at 11:11

## 2019-09-23 RX ADMIN — MORPHINE SULFATE 6 MILLIGRAM(S): 50 CAPSULE, EXTENDED RELEASE ORAL at 03:00

## 2019-09-23 RX ADMIN — PANTOPRAZOLE SODIUM 40 MILLIGRAM(S): 20 TABLET, DELAYED RELEASE ORAL at 14:14

## 2019-09-23 RX ADMIN — Medication 81 MILLIGRAM(S): at 12:12

## 2019-09-23 RX ADMIN — MORPHINE SULFATE 6 MILLIGRAM(S): 50 CAPSULE, EXTENDED RELEASE ORAL at 01:30

## 2019-09-23 NOTE — DISCHARGE NOTE PROVIDER - CARE PROVIDERS DIRECT ADDRESSES
[email or web address]

## 2019-09-23 NOTE — H&P ADULT - HISTORY OF PRESENT ILLNESS
54 year old male patient with past medical history of Afib on Aspirin, YANIRA on CPAP, anxiety, sciatica, presented for acute right hip pain. The patient states that his pain started suddenly on Thursday with no triggering event, reported as very severe 10/10, constant, mild improvement with analgesics and with flexion of the hip, radiating to the RLE, no numbness and no weakness but inability to bear weight, associated with low grade fever. He initially presented to the ED on the 21st and was offered admission for PT rehab however he chose to be discharged on muscle relaxants however he presented again to the ED on the 22nd because of lack of improvement   In the ED a CT scan of the pelvis was done and showed no acute fracture but calcific tendinopathy, the patient was seen by orthopedics, no intervention planned and he was admitted for IV analgesia and PT.

## 2019-09-23 NOTE — ED ADULT NURSE REASSESSMENT NOTE - NS ED NURSE REASSESS COMMENT FT1
Pt resting comfortably, denies pain/discomfort at this time. Pt refusing Cardizem this morning, states he takes at bedtime and wants rescheduled. Med admin time changed to evening. Pt admitted to medicine with hip pain and inability to ambulate due to hip, awaiting bed. VS stable.

## 2019-09-23 NOTE — H&P ADULT - ATTENDING COMMENTS
A 53 yo male with PMH of Paroxysmal AFIB, YANIRA on CPAP came to ED c/o severe right hip pain for the last few days, radiates to the right leg, minimal improves with Ibuprofen, no fall, no trauma, In the ED a CT scan of the pelvis was done and showed no acute fracture but calcific tendinopathy, the patient was seen by orthopedics, no intervention.    PHYSICAL EXAM:  GENERAL: NAD, well-developed  HEAD:  Atraumatic, Normocephalic  EYES: EOMI, PERRLA, conjunctiva and sclera clear  NECK: Supple, No JVD  CHEST/LUNG: Clear to auscultation bilaterally; No wheeze  HEART: Regular rate and rhythm; S1 S2  ABDOMEN: Soft, Nontender, Nondistended; Bowel sounds present  EXTREMITIES:  2+ Peripheral Pulses, No clubbing, cyanosis, or edema  PSYCH: AAOx3  NEUROLOGY: non-focal  SKIN: No rashes or lesions    A/P:   Right hip pain:   Sciatica and Trochanteric Bursitis:   CT Pelvis No Cont (09.22.19) Several calcifications in the region of the greater trochanter and may reflect evidence of calcific tendinopathy.  Pain control, NSAIDs prn. PT    Paroxysmal AFIB:   Rate and rhythm are controlled  Continue Cardizem.

## 2019-09-23 NOTE — DISCHARGE NOTE NURSING/CASE MANAGEMENT/SOCIAL WORK - PATIENT PORTAL LINK FT
You can access the FollowMyHealth Patient Portal offered by [facility] by registering at the following website: [email or web address]. By joining [employer]’s FollowMyHealth portal, you will also be able to view your health information using other applications (apps) compatible with our system.

## 2019-09-23 NOTE — CONSULT NOTE ADULT - SUBJECTIVE AND OBJECTIVE BOX
HPI:  54 year old male patient with past medical history of Afib on Aspirin, YANIRA on CPAP, anxiety, sciatica, presented for acute right hip pain. The patient states that his pain started suddenly on Thursday with no triggering event, reported as very severe 10/10, constant, mild improvement with analgesics and with flexion of the hip, radiating to the RLE, no numbness and no weakness but inability to bear weight, associated with low grade fever. He initially presented to the ED on the  and was offered admission for PT rehab however he chose to be discharged on muscle relaxants however he presented again to the ED on the  because of lack of improvement   In the ED a CT scan of the pelvis was done and showed no acute fracture but calcific tendinopathy, the patient was seen by orthopedics, no intervention planned and he was admitted for IV analgesia and PT. (23 Sep 2019 05:25)      PAST MEDICAL & SURGICAL HISTORY:  Sciatica  Atrial fibrillation, unspecified type  Sleep apnea with use of continuous positive airway pressure (CPAP)  No significant past surgical history      Hospital Course:    TODAY'S SUBJECTIVE & REVIEW OF SYMPTOMS:     Constitutional WNL   Cardio WNL   Resp WNL   GI WNL  Heme WNL  Endo WNL  Skin WNL  MSK right hip pain  Neuro WNL  Cognitive WNL  Psych WNL      MEDICATIONS  (STANDING):  aspirin enteric coated 81 milliGRAM(s) Oral daily  diltiazem    Tablet 120 milliGRAM(s) Oral daily  enoxaparin Injectable 40 milliGRAM(s) SubCutaneous daily  morphine  - Injectable 4 milliGRAM(s) IV Push every 6 hours  pantoprazole    Tablet 40 milliGRAM(s) Oral before breakfast    MEDICATIONS  (PRN):  diazepam    Tablet 10 milliGRAM(s) Oral daily PRN Anxiety  ketorolac   Injectable 30 milliGRAM(s) IV Push every 6 hours PRN Severe Pain (7 - 10)      FAMILY HISTORY:  No pertinent family history in first degree relatives      Allergies    No Known Allergies    Intolerances        SOCIAL HISTORY:    [  ] Etoh  [  ] Smoking  [  ] Substance abuse     Home Environment:  [  ] Home Alone  [x  ] Lives with Family  [  ] Home Health Aid    Dwelling:  [  ] Apartment  [x  ] Private House  [  ] Adult Home  [  ] Skilled Nursing Facility      [  ] Short Term  [  ] Long Term  [x  ] Stairs       Elevator [  ]    FUNCTIONAL STATUS PTA: (Check all that apply)  Ambulation: [ x  ]Independent    [  ] Dependent     [  ] Non-Ambulatory  Assistive Device: [  ] SA Cane  [  ]  Q Cane  [  ] Walker  [  ]  Wheelchair  ADL : [x  ] Independent  [  ]  Dependent       Vital Signs Last 24 Hrs  T(C): 36.2 (23 Sep 2019 07:20), Max: 37.9 (22 Sep 2019 19:17)  T(F): 97.2 (23 Sep 2019 07:20), Max: 100.2 (22 Sep 2019 19:17)  HR: 65 (23 Sep 2019 07:20) (62 - 80)  BP: 143/79 (23 Sep 2019 07:20) (132/66 - 155/72)  BP(mean): --  RR: 16 (23 Sep 2019 07:20) (16 - 20)  SpO2: 98% (23 Sep 2019 07:20) (96% - 99%)      PHYSICAL EXAM: Alert & Oriented X3  GENERAL: NAD, well-groomed, well-developed  HEAD:  Atraumatic, Normocephalic  CHEST/LUNG: Clear   HEART: S1S2+  ABDOMEN: Soft, Nontender  EXTREMITIES:  no calf tenderness, + tenderness right hip    NERVOUS SYSTEM:  Cranial Nerves 2-12 intact [  ] Abnormal  [  ]  ROM: WFL all extremities [  ]  Abnormal [x  ]limited rle  Motor Strength: WFL all extremities  [  ]  Abnormal [ x ]limited rle  Sensation: intact to light touch [x  ] Abnormal [  ]  Reflexes: Symmetric [  ]  Abnormal [  ]    FUNCTIONAL STATUS:  Bed Mobility: Independent [  ]  Supervision [ x ]  Needs Assistance [  ]  N/A [  ]  Transfers: Independent [  ]  Supervision [ x ]  Needs Assistance [  ]  N/A [  ]   Ambulation: Independent [  ]  Supervision [  ]  Needs Assistance [  ]  N/A [  ]  ADL: Independent [  ] Requires Assistance [  ] N/A [  ]      LABS:                        11.3   8.97  )-----------( 176      ( 22 Sep 2019 20:56 )             36.4         139  |  100  |  14  ----------------------------<  113<H>  3.7   |  26  |  0.7    Ca    9.0      23 Sep 2019 11:37  Mg     2.1         TPro  7.1  /  Alb  4.3  /  TBili  1.0  /  DBili  x   /  AST  51<H>  /  ALT  52<H>  /  AlkPhos  63        Urinalysis Basic - ( 21 Sep 2019 19:51 )    Color: Yellow / Appearance: Clear / S.022 / pH: x  Gluc: x / Ketone: Negative  / Bili: Negative / Urobili: 3 mg/dL   Blood: x / Protein: Trace / Nitrite: Negative   Leuk Esterase: Negative / RBC: x / WBC x   Sq Epi: x / Non Sq Epi: x / Bacteria: x        RADIOLOGY & ADDITIONAL STUDIES:    Assesment:

## 2019-09-23 NOTE — CONSULT NOTE ADULT - ASSESSMENT
IMPRESSION: Rehab of right hip pain      PRECAUTIONS: [  ] Cardiac  [  ] Respiratory  [  ] Seizures [  ] Contact Isolation  [  ] Droplet Isolation  [  ] Other    Weight Bearing Status:     RECOMMENDATION:    Out of Bed to Chair     DVT/Decubiti Prophylaxis    REHAB PLAN:     [  x ] Bedside P/T 3-5 times a week   [   ]   Bedside O/T  2-3 times a week             [   ] No Rehab Therapy Indicated                   [   ]  Speech Therapy   Conditioning/ROM                                    ADL  Bed Mobility                                               Conditioning/ROM  Transfers                                                     Bed Mobility  Sitting /Standing Balance                         Transfers                                        Gait Training                                               Sitting/Standing Balance  Stair Training [   ]Applicable                    Home equipment Eval                                                                        Splinting  [   ] Only      GOALS:   ADL   [   ]   Independent                    Transfers  [ x  ] Independent                          Ambulation  [ x  ] Independent     [  x  ] With device                            [   ]  CG                                                         [   ]  CG                                                                  [   ] CG                            [    ] Min A                                                   [   ] Min A                                                              [   ] Min  A          DISCHARGE PLAN:   [   ]  Good candidate for Intensive Rehabilitation/Hospital based                                             Will tolerate 3hrs Intensive Rehab Daily                                       [    ]  Short Term Rehab in Skilled Nursing Facility                                       [ x   ]  Home with Outpatient or VN services / rolling walker                                         [    ]  Possible Candidate for Intensive Hospital based Rehab

## 2019-09-23 NOTE — H&P ADULT - ASSESSMENT
54 year old male patient with past medical history of Afib on Aspirin, YANIRA on CPAP, anxiety, sciatica, presented for acute right hip pain and admitted for pain management and a primary diagnosis of calcific tendinopathy     # Acute right hip pain, calcific tendinopathy on CT scan   Patient evaluated by ortho due to concern of septic arthritis given low grade fever, no leucocytosis, ESR 34   Per ortho no concern for septic 54 year old male patient with past medical history of Afib on Aspirin, YANIRA on CPAP, anxiety, sciatica, presented for acute right hip pain and admitted for pain management and a primary diagnosis of calcific tendinopathy     # Acute right hip pain, calcific tendinopathy on CT scan   Patient evaluated by ortho due to concern of septic arthritis given low grade fever, no leucocytosis, ESR 34   Per ortho no concern for septic arthritis and no surgical intervention   In hospital pain control with Ketorolac and Morphine , on discharge Mobic 7.5 mg PO daily for 14 days - Will keep Ketorolac PRN as patient is already on Aspirin   Outpatient F/U with ortho   PT rehab evaluation   weight bearing as tolerated of the RLE     # Afib   Continue Aspirin and Cardizem     # YANIRA   Continue CPAP     # Miscellaneous   - Lovenox for DVT prophylaxis   - GI prophylaxis : Protonix   - Ambulate as tolerated   - Diet DASH TLC   - Full code   - From home, discharge pending PT evaluation ambulation and adequate pain control 54 year old male patient with past medical history of Afib on Aspirin, YANIRA on CPAP, anxiety, sciatica, presented for acute right hip pain and admitted for pain management and a primary diagnosis of calcific tendinopathy     # Acute right hip pain, calcific tendinopathy on CT scan   Patient evaluated by ortho due to concern of septic arthritis given low grade fever, no leucocytosis, ESR 34   Per ortho no concern for septic arthritis and no surgical intervention   In hospital pain control with Ketorolac and Morphine , on discharge Mobic 7.5 mg PO daily for 14 days - Will keep Ketorolac PRN as patient is already on Aspirin. Monitor BMP closely as patient on Aspirin and Ketorolac and monitor for any signs of GI bleed. Start patient on Protonix for GI prophylaxis    Outpatient F/U with ortho   PT rehab evaluation   weight bearing as tolerated of the RLE     # Afib   Continue Aspirin and Cardizem.    # YANIRA   Continue CPAP     # Miscellaneous   - Lovenox for DVT prophylaxis   - GI prophylaxis : Protonix   - Ambulate as tolerated   - Diet DASH TLC   - Full code   - From home, discharge pending PT evaluation ambulation and adequate pain control 54 year old male patient with past medical history of Afib on Aspirin, YANIRA on CPAP, anxiety, sciatica, presented for acute right hip pain and admitted for pain management and a primary diagnosis of calcific tendinopathy     # Acute right hip pain, calcific tendinopathy on CT scan   Patient evaluated by ortho due to concern of septic arthritis given low grade fever, no leucocytosis, ESR 34   Per ortho no concern for septic arthritis and no surgical intervention   In hospital pain control with Ketorolac and Morphine , on discharge Mobic 7.5 mg PO daily for 14 days - Will keep Ketorolac PRN as patient is already on Aspirin. Monitor BMP closely as patient on Aspirin and Ketorolac and monitor for any signs of GI bleed. Start patient on Protonix for GI prophylaxis    Outpatient F/U with ortho   PT rehab evaluation   weight bearing as tolerated of the RLE     # Afib   Continue Aspirin and Cardizem.    # YANIRA   Continue CPAP     # Anxiety   Patient states he is on Valium 10 mg PRN at home. Please verify dose with pharmacy   Will keep patient on same dose in hospital (if patient truly on Valium abrupt cessation would not be indicated)     # Miscellaneous   - Lovenox for DVT prophylaxis   - GI prophylaxis : Protonix   - Ambulate as tolerated   - Diet DASH TLC   - Full code   - From home, discharge pending PT evaluation ambulation and adequate pain control 54 year old male patient with past medical history of Afib on Aspirin, YANIRA on CPAP, anxiety, sciatica, presented for acute right hip pain and admitted for pain management and a primary diagnosis of calcific tendinopathy     # Acute right hip pain, calcific tendinopathy on CT scan   Patient evaluated by ortho due to concern of septic arthritis given low grade fever, no leucocytosis, ESR 34   Per ortho no concern for septic arthritis and no surgical intervention   In hospital pain control with Ketorolac and Morphine , on discharge Mobic 7.5 mg PO daily for 14 days - Will keep Ketorolac PRN as patient is already on Aspirin. Monitor BMP closely as patient on Aspirin and Ketorolac and monitor for any signs of GI bleed. Start patient on Protonix for GI prophylaxis    Outpatient F/U with ortho   PT rehab evaluation   weight bearing as tolerated of the RLE     # Afib   Continue Aspirin and Cardizem.    # YANIRA   Continue CPAP     # Anxiety   Patient states he is on Valium 10 mg PRN at home. Please verify dose with pharmacy   Will keep patient on same dose in hospital (if patient truly on Valium abrupt cessation would not be indicated)     # Microcytic anemia   F/U iron studies, outpatient follow up and workup with PCP     # Miscellaneous   - Lovenox for DVT prophylaxis   - GI prophylaxis : Protonix   - Ambulate as tolerated   - Diet DASH TLC   - Full code   - From home, discharge pending PT evaluation ambulation and adequate pain control

## 2019-09-23 NOTE — DISCHARGE NOTE PROVIDER - CARE PROVIDER_API CALL
Tanner Bautista)  Orthopaedic Surgery  [address] [place] [address]  Phone: [phone]  Fax: [phone]  Follow Up Time:

## 2019-09-23 NOTE — DISCHARGE NOTE PROVIDER - NSDCCPCAREPLAN_GEN_ALL_CORE_FT
PRINCIPAL DISCHARGE DIAGNOSIS  Diagnosis: Hip pain  Assessment and Plan of Treatment: take medication as directed. follow up with orthopedic surgery      SECONDARY DISCHARGE DIAGNOSES  Diagnosis: Inability to ambulate due to hip  Assessment and Plan of Treatment:

## 2019-09-23 NOTE — H&P ADULT - NSHPPHYSICALEXAM_GEN_ALL_CORE
GENERAL: No distress sleeping comfortably   HEAD:  Atraumatic, Normocephalic  CHEST/LUNG: Clear to auscultation bilaterally; No rales, rhonchi, wheezing, or rubs. Unlabored respirations, on CPAP   HEART: Regular rate and rhythm  ABDOMEN: Bowel sounds present; Soft, Nontender, Nondistended  EXTREMITIES:  + Peripheral Pulses, no edema   NERVOUS SYSTEM:  Alert & Oriented X3, speech clear. No deficits   MSK: Tenderness on palpation of the external aspect of the right and exquisite tenderness on passive ROM of the RLE, exam limited by severe pain

## 2019-09-23 NOTE — DISCHARGE NOTE PROVIDER - HOSPITAL COURSE
54 year old male patient with past medical history of Afib on Aspirin, YANIRA on CPAP, anxiety, sciatica, presented for acute right hip pain and admitted for pain management and a primary diagnosis of calcific tendinopathy         Acute right hip pain, calcific tendinopathy on CT scan     Patient evaluated by ortho due to concern of septic arthritis given low grade fever, no leucocytosis, ESR 34     Per ortho no concern for septic arthritis and no surgical intervention     In hospital pain control with Ketorolac and Morphine , on discharge Mobic 7.5 mg PO daily for 14 days     Outpatient F/U with ortho     PT rehab evaluation

## 2019-09-23 NOTE — H&P ADULT - NSHPSOCIALHISTORY_GEN_ALL_CORE
Patient states that he smokes 2 cigarettes daily and denies alcohol use   he lives at home with his wife and is currently employed

## 2019-09-24 LAB
CRP SERPL-MCNC: 3.62 MG/DL — HIGH (ref 0–0.4)
FERRITIN SERPL-MCNC: 553 NG/ML — HIGH (ref 30–400)

## 2019-09-27 DIAGNOSIS — G47.33 OBSTRUCTIVE SLEEP APNEA (ADULT) (PEDIATRIC): ICD-10-CM

## 2019-09-27 DIAGNOSIS — F41.9 ANXIETY DISORDER, UNSPECIFIED: ICD-10-CM

## 2019-09-27 DIAGNOSIS — M65.20 CALCIFIC TENDINITIS, UNSPECIFIED SITE: ICD-10-CM

## 2019-09-27 DIAGNOSIS — M25.551 PAIN IN RIGHT HIP: ICD-10-CM

## 2019-09-27 DIAGNOSIS — M54.30 SCIATICA, UNSPECIFIED SIDE: ICD-10-CM

## 2019-09-27 DIAGNOSIS — I48.0 PAROXYSMAL ATRIAL FIBRILLATION: ICD-10-CM

## 2020-02-15 ENCOUNTER — TRANSCRIPTION ENCOUNTER (OUTPATIENT)
Age: 55
End: 2020-02-15

## 2020-02-20 NOTE — ED PROVIDER NOTE - NS ED MD DISPO ADMITTING SERVICE
Pt requesting RX refill on her OCP. Has annual appt scheduled on 03/16. Please advise, thank you    MED

## 2020-10-09 NOTE — ED ADULT NURSE NOTE - NS ED NOTE  TALK SOMEONE YN
-- DO NOT REPLY / DO NOT REPLY ALL --  -- Message is from the [facility]--    COVID-19 Universal Screening: N/A - Not about scheduling    General Patient Message      Reason for Call: Patient returning call, please call back when available.    Caller Information       Type Contact Phone    10/09/2020 08:52 AM CDT Phone (Incoming) Tiffanie Paul (Self) [phone] (M)          Alternative phone number: none    Turnaround time given to caller:   \"This message will be sent to [state Provider's name]. The clinical team will fulfill your request as soon as they review your message.\"     No

## 2021-02-22 ENCOUNTER — TRANSCRIPTION ENCOUNTER (OUTPATIENT)
Age: 56
End: 2021-02-22

## 2021-08-31 ENCOUNTER — INPATIENT (INPATIENT)
Facility: HOSPITAL | Age: 56
LOS: 2 days | Discharge: HOME | End: 2021-09-03
Attending: INTERNAL MEDICINE | Admitting: INTERNAL MEDICINE
Payer: COMMERCIAL

## 2021-08-31 ENCOUNTER — TRANSCRIPTION ENCOUNTER (OUTPATIENT)
Age: 56
End: 2021-08-31

## 2021-08-31 VITALS
TEMPERATURE: 99 F | WEIGHT: 294.98 LBS | SYSTOLIC BLOOD PRESSURE: 178 MMHG | RESPIRATION RATE: 18 BRPM | HEART RATE: 78 BPM | DIASTOLIC BLOOD PRESSURE: 83 MMHG | OXYGEN SATURATION: 93 % | HEIGHT: 72 IN

## 2021-08-31 DIAGNOSIS — K22.8 OTHER SPECIFIED DISEASES OF ESOPHAGUS: ICD-10-CM

## 2021-08-31 PROBLEM — M54.30 SCIATICA, UNSPECIFIED SIDE: Chronic | Status: ACTIVE | Noted: 2019-09-22

## 2021-08-31 LAB
ALBUMIN SERPL ELPH-MCNC: 4.1 G/DL — SIGNIFICANT CHANGE UP (ref 3.5–5.2)
ALP SERPL-CCNC: 59 U/L — SIGNIFICANT CHANGE UP (ref 30–115)
ALT FLD-CCNC: 43 U/L — HIGH (ref 0–41)
ANION GAP SERPL CALC-SCNC: 10 MMOL/L — SIGNIFICANT CHANGE UP (ref 7–14)
APTT BLD: 41.7 SEC — HIGH (ref 27–39.2)
AST SERPL-CCNC: 43 U/L — HIGH (ref 0–41)
BASOPHILS # BLD AUTO: 0.05 K/UL — SIGNIFICANT CHANGE UP (ref 0–0.2)
BASOPHILS # BLD AUTO: 0.05 K/UL — SIGNIFICANT CHANGE UP (ref 0–0.2)
BASOPHILS NFR BLD AUTO: 0.6 % — SIGNIFICANT CHANGE UP (ref 0–1)
BASOPHILS NFR BLD AUTO: 0.8 % — SIGNIFICANT CHANGE UP (ref 0–1)
BILIRUB SERPL-MCNC: 0.9 MG/DL — SIGNIFICANT CHANGE UP (ref 0.2–1.2)
BLD GP AB SCN SERPL QL: SIGNIFICANT CHANGE UP
BLD GP AB SCN SERPL QL: SIGNIFICANT CHANGE UP
BUN SERPL-MCNC: 9 MG/DL — LOW (ref 10–20)
CALCIUM SERPL-MCNC: 8.6 MG/DL — SIGNIFICANT CHANGE UP (ref 8.5–10.1)
CHLORIDE SERPL-SCNC: 104 MMOL/L — SIGNIFICANT CHANGE UP (ref 98–110)
CO2 SERPL-SCNC: 26 MMOL/L — SIGNIFICANT CHANGE UP (ref 17–32)
CREAT SERPL-MCNC: 0.7 MG/DL — SIGNIFICANT CHANGE UP (ref 0.7–1.5)
EOSINOPHIL # BLD AUTO: 0.04 K/UL — SIGNIFICANT CHANGE UP (ref 0–0.7)
EOSINOPHIL # BLD AUTO: 0.13 K/UL — SIGNIFICANT CHANGE UP (ref 0–0.7)
EOSINOPHIL NFR BLD AUTO: 0.5 % — SIGNIFICANT CHANGE UP (ref 0–8)
EOSINOPHIL NFR BLD AUTO: 2.1 % — SIGNIFICANT CHANGE UP (ref 0–8)
GLUCOSE SERPL-MCNC: 144 MG/DL — HIGH (ref 70–99)
HCT VFR BLD CALC: 34.3 % — LOW (ref 42–52)
HCT VFR BLD CALC: 37.9 % — LOW (ref 42–52)
HGB BLD-MCNC: 10.6 G/DL — LOW (ref 14–18)
HGB BLD-MCNC: 11.6 G/DL — LOW (ref 14–18)
IMM GRANULOCYTES NFR BLD AUTO: 0.5 % — HIGH (ref 0.1–0.3)
IMM GRANULOCYTES NFR BLD AUTO: 0.7 % — HIGH (ref 0.1–0.3)
INR BLD: 1.18 RATIO — SIGNIFICANT CHANGE UP (ref 0.65–1.3)
LACTATE SERPL-SCNC: 1.5 MMOL/L — SIGNIFICANT CHANGE UP (ref 0.7–2)
LIDOCAIN IGE QN: 20 U/L — SIGNIFICANT CHANGE UP (ref 7–60)
LYMPHOCYTES # BLD AUTO: 0.99 K/UL — LOW (ref 1.2–3.4)
LYMPHOCYTES # BLD AUTO: 1.32 K/UL — SIGNIFICANT CHANGE UP (ref 1.2–3.4)
LYMPHOCYTES # BLD AUTO: 11.7 % — LOW (ref 20.5–51.1)
LYMPHOCYTES # BLD AUTO: 21.4 % — SIGNIFICANT CHANGE UP (ref 20.5–51.1)
MCHC RBC-ENTMCNC: 20.4 PG — LOW (ref 27–31)
MCHC RBC-ENTMCNC: 20.6 PG — LOW (ref 27–31)
MCHC RBC-ENTMCNC: 30.6 G/DL — LOW (ref 32–37)
MCHC RBC-ENTMCNC: 30.9 G/DL — LOW (ref 32–37)
MCV RBC AUTO: 66.6 FL — LOW (ref 80–94)
MCV RBC AUTO: 66.7 FL — LOW (ref 80–94)
MONOCYTES # BLD AUTO: 0.16 K/UL — SIGNIFICANT CHANGE UP (ref 0.1–0.6)
MONOCYTES # BLD AUTO: 0.43 K/UL — SIGNIFICANT CHANGE UP (ref 0.1–0.6)
MONOCYTES NFR BLD AUTO: 1.9 % — SIGNIFICANT CHANGE UP (ref 1.7–9.3)
MONOCYTES NFR BLD AUTO: 7 % — SIGNIFICANT CHANGE UP (ref 1.7–9.3)
NEUTROPHILS # BLD AUTO: 4.21 K/UL — SIGNIFICANT CHANGE UP (ref 1.4–6.5)
NEUTROPHILS # BLD AUTO: 7.19 K/UL — HIGH (ref 1.4–6.5)
NEUTROPHILS NFR BLD AUTO: 68.2 % — SIGNIFICANT CHANGE UP (ref 42.2–75.2)
NEUTROPHILS NFR BLD AUTO: 84.6 % — HIGH (ref 42.2–75.2)
NRBC # BLD: 0 /100 WBCS — SIGNIFICANT CHANGE UP (ref 0–0)
NRBC # BLD: 0 /100 WBCS — SIGNIFICANT CHANGE UP (ref 0–0)
PLATELET # BLD AUTO: 143 K/UL — SIGNIFICANT CHANGE UP (ref 130–400)
PLATELET # BLD AUTO: 183 K/UL — SIGNIFICANT CHANGE UP (ref 130–400)
POTASSIUM SERPL-MCNC: 3.8 MMOL/L — SIGNIFICANT CHANGE UP (ref 3.5–5)
POTASSIUM SERPL-SCNC: 3.8 MMOL/L — SIGNIFICANT CHANGE UP (ref 3.5–5)
PROT SERPL-MCNC: 6.8 G/DL — SIGNIFICANT CHANGE UP (ref 6–8)
PROTHROM AB SERPL-ACNC: 13.5 SEC — HIGH (ref 9.95–12.87)
RAPID RVP RESULT: SIGNIFICANT CHANGE UP
RBC # BLD: 5.14 M/UL — SIGNIFICANT CHANGE UP (ref 4.7–6.1)
RBC # BLD: 5.69 M/UL — SIGNIFICANT CHANGE UP (ref 4.7–6.1)
RBC # FLD: 16.9 % — HIGH (ref 11.5–14.5)
RBC # FLD: 17 % — HIGH (ref 11.5–14.5)
SARS-COV-2 RNA SPEC QL NAA+PROBE: SIGNIFICANT CHANGE UP
SODIUM SERPL-SCNC: 140 MMOL/L — SIGNIFICANT CHANGE UP (ref 135–146)
TROPONIN T SERPL-MCNC: <0.01 NG/ML — SIGNIFICANT CHANGE UP
WBC # BLD: 6.17 K/UL — SIGNIFICANT CHANGE UP (ref 4.8–10.8)
WBC # BLD: 8.49 K/UL — SIGNIFICANT CHANGE UP (ref 4.8–10.8)
WBC # FLD AUTO: 6.17 K/UL — SIGNIFICANT CHANGE UP (ref 4.8–10.8)
WBC # FLD AUTO: 8.49 K/UL — SIGNIFICANT CHANGE UP (ref 4.8–10.8)

## 2021-08-31 PROCEDURE — 99223 1ST HOSP IP/OBS HIGH 75: CPT | Mod: 25

## 2021-08-31 PROCEDURE — 31575 DIAGNOSTIC LARYNGOSCOPY: CPT

## 2021-08-31 PROCEDURE — 93010 ELECTROCARDIOGRAM REPORT: CPT

## 2021-08-31 PROCEDURE — 74177 CT ABD & PELVIS W/CONTRAST: CPT | Mod: 26,MA

## 2021-08-31 PROCEDURE — 99222 1ST HOSP IP/OBS MODERATE 55: CPT | Mod: 25

## 2021-08-31 PROCEDURE — 99223 1ST HOSP IP/OBS HIGH 75: CPT

## 2021-08-31 PROCEDURE — 44360 SMALL BOWEL ENDOSCOPY: CPT

## 2021-08-31 PROCEDURE — 99222 1ST HOSP IP/OBS MODERATE 55: CPT

## 2021-08-31 PROCEDURE — 99285 EMERGENCY DEPT VISIT HI MDM: CPT

## 2021-08-31 PROCEDURE — 31231 NASAL ENDOSCOPY DX: CPT

## 2021-08-31 PROCEDURE — 71275 CT ANGIOGRAPHY CHEST: CPT | Mod: 26,MA

## 2021-08-31 PROCEDURE — 71045 X-RAY EXAM CHEST 1 VIEW: CPT | Mod: 26,77

## 2021-08-31 PROCEDURE — 71045 X-RAY EXAM CHEST 1 VIEW: CPT | Mod: 26

## 2021-08-31 RX ORDER — OCTREOTIDE ACETATE 200 UG/ML
50 INJECTION, SOLUTION INTRAVENOUS; SUBCUTANEOUS ONCE
Refills: 0 | Status: COMPLETED | OUTPATIENT
Start: 2021-08-31 | End: 2021-08-31

## 2021-08-31 RX ORDER — MORPHINE SULFATE 50 MG/1
4 CAPSULE, EXTENDED RELEASE ORAL EVERY 4 HOURS
Refills: 0 | Status: DISCONTINUED | OUTPATIENT
Start: 2021-08-31 | End: 2021-08-31

## 2021-08-31 RX ORDER — PANTOPRAZOLE SODIUM 20 MG/1
40 TABLET, DELAYED RELEASE ORAL ONCE
Refills: 0 | Status: COMPLETED | OUTPATIENT
Start: 2021-08-31 | End: 2021-08-31

## 2021-08-31 RX ORDER — PANTOPRAZOLE SODIUM 20 MG/1
8 TABLET, DELAYED RELEASE ORAL
Qty: 80 | Refills: 0 | Status: DISCONTINUED | OUTPATIENT
Start: 2021-08-31 | End: 2021-08-31

## 2021-08-31 RX ORDER — CHLORHEXIDINE GLUCONATE 213 G/1000ML
1 SOLUTION TOPICAL
Refills: 0 | Status: DISCONTINUED | OUTPATIENT
Start: 2021-08-31 | End: 2021-09-03

## 2021-08-31 RX ORDER — DIAZEPAM 5 MG
1 TABLET ORAL
Qty: 0 | Refills: 0 | DISCHARGE

## 2021-08-31 RX ORDER — PANTOPRAZOLE SODIUM 20 MG/1
40 TABLET, DELAYED RELEASE ORAL EVERY 12 HOURS
Refills: 0 | Status: DISCONTINUED | OUTPATIENT
Start: 2021-08-31 | End: 2021-09-01

## 2021-08-31 RX ORDER — SODIUM CHLORIDE 9 MG/ML
1000 INJECTION INTRAMUSCULAR; INTRAVENOUS; SUBCUTANEOUS ONCE
Refills: 0 | Status: COMPLETED | OUTPATIENT
Start: 2021-08-31 | End: 2021-08-31

## 2021-08-31 RX ORDER — CEFTRIAXONE 500 MG/1
1000 INJECTION, POWDER, FOR SOLUTION INTRAMUSCULAR; INTRAVENOUS ONCE
Refills: 0 | Status: COMPLETED | OUTPATIENT
Start: 2021-08-31 | End: 2021-08-31

## 2021-08-31 RX ADMIN — PANTOPRAZOLE SODIUM 40 MILLIGRAM(S): 20 TABLET, DELAYED RELEASE ORAL at 09:36

## 2021-08-31 RX ADMIN — MORPHINE SULFATE 4 MILLIGRAM(S): 50 CAPSULE, EXTENDED RELEASE ORAL at 08:06

## 2021-08-31 RX ADMIN — Medication 2 MILLIGRAM(S): at 21:23

## 2021-08-31 RX ADMIN — PANTOPRAZOLE SODIUM 10 MG/HR: 20 TABLET, DELAYED RELEASE ORAL at 09:38

## 2021-08-31 RX ADMIN — OCTREOTIDE ACETATE 50 MICROGRAM(S): 200 INJECTION, SOLUTION INTRAVENOUS; SUBCUTANEOUS at 11:28

## 2021-08-31 RX ADMIN — PANTOPRAZOLE SODIUM 40 MILLIGRAM(S): 20 TABLET, DELAYED RELEASE ORAL at 18:29

## 2021-08-31 RX ADMIN — SODIUM CHLORIDE 1000 MILLILITER(S): 9 INJECTION INTRAMUSCULAR; INTRAVENOUS; SUBCUTANEOUS at 08:05

## 2021-08-31 RX ADMIN — CEFTRIAXONE 100 MILLIGRAM(S): 500 INJECTION, POWDER, FOR SOLUTION INTRAMUSCULAR; INTRAVENOUS at 11:14

## 2021-08-31 NOTE — ED PROVIDER NOTE - CARE PLAN
1 Principal Discharge DX:	Upper GI bleed  Secondary Diagnosis:	Anemia  Secondary Diagnosis:	Cirrhosis

## 2021-08-31 NOTE — ED PROVIDER NOTE - ATTENDING CONTRIBUTION TO CARE
57 yo M pmh of a fib no ac, hypothyroid, gina, esophageal nodule removed 9 years ago presents with coughing up blood. State that this morning he woke up with blood on his face. Has been coughing up blood but feeling nauseas. States that he is not clear if it is coming from his lungs or GI tract. no vomiting episodes. Has had a few days of darker stools but no blood. + epigastric pain. + SOB but states that he is feeling very anxious. no chest pain, no palpitations. no fevers or recent illness. GI is Dr. Davis.     CONSTITUTIONAL: Well-developed; well-nourished; in no acute distress.   SKIN: warm, dry  HEAD: Normocephalic; atraumatic.  EYES: PERRL, EOMI, no conjunctival erythema  ENT: No nasal discharge; airway clear.  NECK: Supple; non tender.  CARD: S1, S2 normal;  Regular rate and rhythm.   RESP: No wheezes, rales or rhonchi.  ABD: soft + epigastric tenderness, non distended, no rebound or guarding  EXT: Normal ROM.  5/5 strength in all 4 extremities   LYMPH: No acute cervical adenopathy.  NEURO: Alert, oriented, grossly unremarkable. neurovascularly intact  PSYCH: Cooperative, appropriate.

## 2021-08-31 NOTE — ED ADULT TRIAGE NOTE - CHIEF COMPLAINT QUOTE
Pt BIBEMS for coughing up blood. PT woke from sleep with a bloody pillow. PT coughing up clots multiple times since waking. PT on ASA and cardizem. No sick contacts, no fever, SOB or chest pain

## 2021-08-31 NOTE — H&P ADULT - ATTENDING COMMENTS
1. Hemoptysis r/o esophageal varices  GI consulted, plan for endoscopy  Received IV Octreotide  C/w NPO, IVF and IV protonix  Advance diet after endoscopy if recommended by GI  Monitor H/H and vitals  Discharge in 24 hrs if no further bleeding noted and vitals stable    2. Hepatic lesion  Outpatient MRI and f/u with GI    Prepare for discharge in 24 to 48 hrs

## 2021-08-31 NOTE — H&P ADULT - NSHPSOCIALHISTORY_GEN_ALL_CORE
Marital Status:  ( x  )    (   ) Single    (   )    (  )   Lives with: (  ) alone  (  ) children   ( x ) spouse   (  ) parents  (  ) other  Substance Use (street drugs): Denies IV drug use. Reports occasional marijuana use   Tobacco Usage:  2-3 cigs/day for 10 years   Alcohol Usage: None

## 2021-08-31 NOTE — H&P ADULT - NSHPLABSRESULTS_GEN_ALL_CORE
CT: Chest Angio PE Protocol   CT: Abdomen/Pelvis     No evidence of central pulmonary embolism.    Small to moderate pericardial effusion.    Findings consistent with cirrhosis with lobulated liver contour, enlarged spleen, and gynecomastia.    Indeterminate 1 cm right hepatic lesion. Given the above findings of cirrhosis, outpatient MRI with gadolinium is recommended for further characterization.

## 2021-08-31 NOTE — H&P ADULT - NSHPREVIEWOFSYSTEMS_GEN_ALL_CORE
CONSTITUTIONAL: No weakness, fevers or chills  EYES/ENT: No visual changes;  No vertigo or throat pain   NECK: No pain or stiffness  RESPIRATORY: Hemoptysis; Dyspnea   CARDIOVASCULAR: No chest pain or palpitations  GASTROINTESTINAL: No abdominal or epigastric pain. No nausea, vomiting, or hematemesis; No diarrhea or constipation. No melena or hematochezia.  GENITOURINARY: No dysuria, frequency or hematuria  NEUROLOGICAL: No numbness or weakness  SKIN: No itching, rashes

## 2021-08-31 NOTE — CONSULT NOTE ADULT - SUBJECTIVE AND OBJECTIVE BOX
Patient is a 56y old  Male who presents with a chief complaint of Hemoptysis (31 Aug 2021 13:24)      HPI:  56 y M w/ PMH of PAF not on AC, Sleep apnea on CPAP, hemorrhoids found during most recent colonoscopy,  hypothyroidism, and PSHx of esophageal nodule removal 9 years ago presented to ED with dyspnea and hemoptysis. States that when he woke up this morning, noticed blood and his pillow. When he got out bed of he began to spit up bright red blood, about a tablespoon's worth. Denies similar symptoms in the past. Denies family Hx of liver disease, IV drug use or alcohol use.  Currently denies abdominal pain, nausea, and vomiting.     In ED patient was slightly hypertensive, all other vitals were wnl. There was no evidence of PE in CT angio chest. CT Abdo/pelvis findings were consistent with cirrhosis with lobulated liver contour, enlarged spleen, and gynecomastia. Octreotide injection, and 1G Rocephin  were given.  Patient started on Protonix gtt.     (31 Aug 2021 13:24)      PAST MEDICAL & SURGICAL HISTORY:  Sleep apnea with use of continuous positive airway pressure (CPAP)    Atrial fibrillation, unspecified type    Sciatica    Hypothyroidism        Occupational hx:    Social hx:    FAMILY HISTORY:  Family history of colon cancer in father (Father)    :  No known cardiovacular family hisotry     ROS:  See HPI     Allergies    No Known Allergies    Intolerances          PHYSICAL EXAM    ICU Vital Signs Last 24 Hrs  T(C): 36.2 (31 Aug 2021 15:10), Max: 37.1 (31 Aug 2021 06:46)  T(F): 97.1 (31 Aug 2021 13:56), Max: 98.7 (31 Aug 2021 06:46)  HR: 62 (31 Aug 2021 15:10) (62 - 78)  BP: 159/92 (31 Aug 2021 15:10) (154/74 - 178/83)  BP(mean): --  ABP: --  ABP(mean): --  RR: 18 (31 Aug 2021 15:10) (18 - 20)  SpO2: 97% (31 Aug 2021 09:50) (93% - 97%)      CONSTITUTIONAL:  Well nourished.  NAD    ENT:   Airway patent,   No thrush    EYES:   Clear bilaterally,   pupils equal,   round and reactive to light.    CARDIAC:   Normal rate,   regular rhythm.    no edema      CAROTID:   normal systolic impulse  no bruits    RESPIRATORY:   No wheezing  Normal chest expansion  Not tachypneic,  No use of accessory muscles    GASTROINTESTINAL:  Abdomen soft,   non-tender,   no guarding,   + BS    MUSCULOSKELETAL:   range of motion is not limited,  no clubbing, cyanosis    NEUROLOGICAL:   Alert and oriented   no motor deficits.    SKIN:   Skin normal color for race,   No evidence of rash.    PSYCHIATRIC:   normal mood and affect.   no apparent risk to self or others.        LABS:                          10.6   6.17  )-----------( 143      ( 31 Aug 2021 08:15 )             34.3                                               08-31    140  |  104  |  9<L>  ----------------------------<  144<H>  3.8   |  26  |  0.7    Ca    8.6      31 Aug 2021 08:15    TPro  6.8  /  Alb  4.1  /  TBili  0.9  /  DBili  x   /  AST  43<H>  /  ALT  43<H>  /  AlkPhos  59  08-31      PT/INR - ( 31 Aug 2021 10:47 )   PT: 13.50 sec;   INR: 1.18 ratio         PTT - ( 31 Aug 2021 10:47 )  PTT:41.7 sec                                           CARDIAC MARKERS ( 31 Aug 2021 08:15 )  x     / <0.01 ng/mL / x     / x     / x                                                LIVER FUNCTIONS - ( 31 Aug 2021 08:15 )  Alb: 4.1 g/dL / Pro: 6.8 g/dL / ALK PHOS: 59 U/L / ALT: 43 U/L / AST: 43 U/L / GGT: x                                                                                                                                       X-Rays                                                                                     ECHO    MEDICATIONS  (STANDING):  pantoprazole Infusion 8 mG/Hr (10 mL/Hr) IV Continuous <Continuous>    MEDICATIONS  (PRN):         Patient is a 56y old  Male who presents with a chief complaint of Hemoptysis (31 Aug 2021 13:24)      HPI:  56 y M w/ PMH of PAF not on AC, Sleep apnea on CPAP, hemorrhoids found during most recent colonoscopy,  hypothyroidism, and PSHx of esophageal nodule removal 9 years ago presented to ED with dyspnea and hemoptysis. States that when he woke up this morning, noticed blood and his pillow. When he got out bed of he began to spit up bright red blood, about a tablespoon's worth. Denies similar symptoms in the past. Denies family Hx of liver disease, IV drug use or alcohol use.  Currently denies abdominal pain, nausea, and vomiting.     In ED patient was slightly hypertensive, all other vitals were wnl. There was no evidence of PE in CT angio chest. CT Abdo/pelvis findings were consistent with cirrhosis with lobulated liver contour, enlarged spleen, and gynecomastia. Octreotide injection, and 1G Rocephin  were given.  Patient started on Protonix gtt.     (31 Aug 2021 13:24)      PAST MEDICAL & SURGICAL HISTORY:  Sleep apnea with use of continuous positive airway pressure (CPAP)    Atrial fibrillation, unspecified type    Sciatica    Hypothyroidism        Occupational hx:    Social hx:    FAMILY HISTORY:  Family history of colon cancer in father (Father)    :  No known cardiovacular family hisotry     ROS:  See HPI     Allergies    No Known Allergies    Intolerances          PHYSICAL EXAM    ICU Vital Signs Last 24 Hrs  T(C): 36.2 (31 Aug 2021 15:10), Max: 37.1 (31 Aug 2021 06:46)  T(F): 97.1 (31 Aug 2021 13:56), Max: 98.7 (31 Aug 2021 06:46)  HR: 62 (31 Aug 2021 15:10) (62 - 78)  BP: 159/92 (31 Aug 2021 15:10) (154/74 - 178/83)  BP(mean): --  ABP: --  ABP(mean): --  RR: 18 (31 Aug 2021 15:10) (18 - 20)  SpO2: 97% (31 Aug 2021 09:50) (93% - 97%)      CONSTITUTIONAL:  Well nourished.  NAD    ENT:   Airway patent,   No thrush    EYES:   Clear bilaterally,   pupils equal,   round and reactive to light.    CARDIAC:   Normal rate,   regular rhythm.    no edema      CAROTID:   normal systolic impulse  no bruits    RESPIRATORY:   No wheezing  Normal chest expansion  Not tachypneic,  No use of accessory muscles    GASTROINTESTINAL:  Abdomen soft,   non-tender,   no guarding,   + BS    MUSCULOSKELETAL:   range of motion is not limited,  no clubbing, cyanosis    NEUROLOGICAL:   Alert and oriented   no motor deficits.    SKIN:   Skin normal color for race,   No evidence of rash.    PSYCHIATRIC:   normal mood and affect.   no apparent risk to self or others.        LABS:                          10.6   6.17  )-----------( 143      ( 31 Aug 2021 08:15 )             34.3                                               08-31    140  |  104  |  9<L>  ----------------------------<  144<H>  3.8   |  26  |  0.7    Ca    8.6      31 Aug 2021 08:15    TPro  6.8  /  Alb  4.1  /  TBili  0.9  /  DBili  x   /  AST  43<H>  /  ALT  43<H>  /  AlkPhos  59  08-31      PT/INR - ( 31 Aug 2021 10:47 )   PT: 13.50 sec;   INR: 1.18 ratio         PTT - ( 31 Aug 2021 10:47 )  PTT:41.7 sec                                           CARDIAC MARKERS ( 31 Aug 2021 08:15 )  x     / <0.01 ng/mL / x     / x     / x                                                LIVER FUNCTIONS - ( 31 Aug 2021 08:15 )  Alb: 4.1 g/dL / Pro: 6.8 g/dL / ALK PHOS: 59 U/L / ALT: 43 U/L / AST: 43 U/L / GGT: x                                                                                                                                       X-Rays           NO infiltrate                                                                           ECHO    MEDICATIONS  (STANDING):  pantoprazole Infusion 8 mG/Hr (10 mL/Hr) IV Continuous <Continuous>    MEDICATIONS  (PRN):

## 2021-08-31 NOTE — CONSULT NOTE ADULT - SUBJECTIVE AND OBJECTIVE BOX
Gastroenterology Consultation:    Patient is a 56y old  Male who presents with a chief complaint of     Admitted on: 08-31-21  HPI: 56 year old male with PMH of Afib not on anticoagulation, Sleep apnea on CPAP @ night, thalassemia trait , PSH  of esophogeal nodule removed 9 years ago presenting with dyspnea and hemoptysis/hematemesis. Pt reports awoke with bright red blood on his pillow and face. Reports stood up and experienced more episodes of bright red blood with more than a table spoon, unable to decipher to if vomiting up blood or coughing up blood. Reports began to experience epigastric pain upon ED arrival.   Currently denies any abdominal pain, nausea, vomiting.   GI Dr. Davis. Denies hx of alcohol usage, cp, palpitations, vomiting, diarrhea, constipation, inability to pass flatus.      Prior EGD:9 years ago found to have esophageal nodule.   Prior Colonoscopy: 1 month ago normal as per patient      PAST MEDICAL & SURGICAL HISTORY:  Sleep apnea with use of continuous positive airway pressure (CPAP)    Atrial fibrillation, unspecified type    Sciatica    No significant past surgical history        FAMILY HISTORY:  No pertinent family history in first degree relatives        Social History:  Tobacco: 3-4 cigaretts/day  Alcohol: No  Drugs: No    Home Medications:  Aspirin Low Dose 81 mg oral delayed release tablet: 1 tab(s) orally once a day (23 Sep 2019 05:36)  Cardizem 120 mg oral tablet: 1 tab(s) orally once a day (23 Sep 2019 05:36)  Valium 10 mg oral tablet: 1 tab(s) orally once a day, As Needed (23 Sep 2019 05:36)    MEDICATIONS  (STANDING):  pantoprazole Infusion 8 mG/Hr (10 mL/Hr) IV Continuous <Continuous>    MEDICATIONS  (PRN):      Allergies  No Known Allergies      Review of Systems:   Constitutional:  No Fever, No Chills  ENT/Mouth:  No Hearing Changes,  No Difficulty Swallowing  Eyes:  No Eye Pain, No Vision Changes  Cardiovascular:  No Chest Pain, No Palpitations  Respiratory:  No Cough, No Dyspnea  Gastrointestinal:  As described in HPI  Musculoskeletal:  No Joint Swelling, No Back Pain  Skin:  No Skin Lesions, No Jaundice  Neuro:  No Syncope, No Dizziness  Heme/Lymph:  No Bruising, No Bleeding.          Physical Examination:  T(C): 37.1 (08-31-21 @ 06:46), Max: 37.1 (08-31-21 @ 06:46)  HR: 69 (08-31-21 @ 09:50) (69 - 78)  BP: 154/74 (08-31-21 @ 09:50) (154/74 - 178/83)  RR: 20 (08-31-21 @ 09:50) (18 - 20)  SpO2: 97% (08-31-21 @ 09:50) (93% - 97%)  Height (cm): 182.9 (08-31-21 @ 06:46)  Weight (kg): 133.8 (08-31-21 @ 06:46)      Constitutional: No acute distress.  Eyes:. Conjunctivae are clear, Sclera is non-icteric.  Ears Nose and Throat: The external ears are normal appearing,  Oral mucosa is pink and moist.  Respiratory:  No signs of respiratory distress. Lung sounds are clear bilaterally.  Cardiovascular:  S1 S2, Regular rate and rhythm.  GI: Abdomen is soft, symmetric, and non-tender without distention. There are no visible lesions or scars. Bowel sounds are present and normoactive in all four quadrants. No masses, hepatomegaly, or splenomegaly are noted.   Neuro: No Tremor, No involuntary movements  Skin: No rashes, No Jaundice.          Data:                        10.6   6.17  )-----------( 143      ( 31 Aug 2021 08:15 )             34.3     Hgb Trend:  10.6  08-31-21 @ 08:15      08-31    140  |  104  |  9<L>  ----------------------------<  144<H>  3.8   |  26  |  0.7    Ca    8.6      31 Aug 2021 08:15    TPro  6.8  /  Alb  4.1  /  TBili  0.9  /  DBili  x   /  AST  43<H>  /  ALT  43<H>  /  AlkPhos  59  08-31    Liver panel trend:  TBili 0.9   /   AST 43   /   ALT 43   /   AlkP 59   /   Tptn 6.8   /   Alb 4.1    /   DBili --      08-31      PT/INR - ( 31 Aug 2021 10:47 )   PT: 13.50 sec;   INR: 1.18 ratio         PTT - ( 31 Aug 2021 10:47 )  PTT:41.7 sec        Radiology:  CT Abdomen and Pelvis w/ IV Cont:   EXAM:  CT ANGIO CHEST PULM ART [facility]        EXAM:  CT ABDOMEN AND PELVIS IC            PROCEDURE DATE:  08/31/2021            INTERPRETATION:  CLINICAL STATEMENT: Abdominal pain and hemoptysis    TECHNIQUE: Contiguous axial CT images were obtained from the chest to the pubic symphysis . Initially, CT angiogram of the chest was performed after intravenous contrast to evaluate for pulmonary embolism. Then, delayed images of the abdomen pelvis were obtained. Oral contrast was not given.  Reformatted images in the coronal and sagittal planes were acquired. Additional MIP images were obtained    COMPARISON CT: Abdominal pelvic CT 7/20/2014      FINDINGS:    CHEST: There is no evidence of central pulmonary embolism. The thoracic aorta is normal in caliber. There is coronary artery calcifications. The heart size within normal limits. There is a small to moderate pericardial effusion. There is no thoracic lymphadenopathy.   The thyroid Is present, incompletely evaluated on CT. The central tracheobronchial tree is patent. There is no consolidation, pneumothorax or pleural effusion. There is dependent atelectasis and patchy areas of subsegmental atelectasis. There is mild bilateral gynecomastia    HEPATOBILIARY: The left liver is lobulated incontour. There is a 1 cm right hepatic hypodensity on image 27 of series 6, incompletely characterized. Further evaluation with outpatient MRI with gadolinium is recommended. The gallbladder is distended.    SPLEEN: The spleen is enlarged measuring 16 cm.    PANCREAS: Unremarkable..    ADRENAL GLANDS: There is nodular thickening of the left adrenal gland. The right adrenal gland is unremarkable.    KIDNEYS: Subcentimeter hypodensities are too small to characterize. There is no hydronephrosis.    ABDOMINOPELVIC NODES: Unremarkable..    PELVIC ORGANS: There are prostate calcifications. There are small bilateral fat-containing inguinal hernias    PERITONEUM/MESENTERY/BOWEL: There is no free air or obstruction. The appendix is unremarkable.    BONES/SOFT TISSUES: Degenerative change. Old left rib fracture...    OTHER: Vascular calcifications are present..      IMPRESSION:    No evidence of central pulmonary embolism.    Small to moderate pericardial effusion.    Findings consistent with cirrhosis with lobulated liver contour, enlarged spleen, and gynecomastia.    Indeterminate 1 cm right hepatic lesion. Given the above findings of cirrhosis, outpatient MRI with gadolinium is recommended for further characterization.    --- End of Report ---              LUKAS NOEL MD; Attending Radiologist  This document has been electronically signed. Aug 31 2021 10:06AM (08-31-21 @ 08:53)

## 2021-08-31 NOTE — CHART NOTE - NSCHARTNOTEFT_GEN_A_CORE
EGD findings:   Nodule in the upper third of the esophagus 20 cm from incisor.      Normal mucosa in the whole stomach.      Normal mucosa in the whole examined duodenum.   No esophageal or gastric varices noted      Plan:   -Bleeding was noted above the vocal cords which was suctioned. No active bleeding or blood was noted in the esophagus, stomach and duodenum.    -Was seen by ENT no intranasal or laryngeal source of bleed identified.  -Bleeding from nose due to nasal CPAP due to dried off nasal mucosa might have stopped.    -No contraindication from GI stand point to start feeding.   -Trend CBC BID  -Keep Hb>8  -Active type and screen  -Needs to follow up as an out patient for removal of esophageal nodule

## 2021-08-31 NOTE — CONSULT NOTE ADULT - ASSESSMENT
57y/o M with hemoptysis.    - Pt seen and examined in endoscopy suite with Dr. Hugo, plan as per attending  - No acute ENT intervention at this time. No intranasal or laryngeal source of bleed identified.  - If need O2 please humidify  - Afrin to nares b/l BID x 3 days.  - Care per primary team/GI  - Recall prn.

## 2021-08-31 NOTE — H&P ADULT - NSICDXPASTMEDICALHX_GEN_ALL_CORE_FT
PAST MEDICAL HISTORY:  Atrial fibrillation, unspecified type     Hypothyroidism     Sciatica     Sleep apnea with use of continuous positive airway pressure (CPAP)

## 2021-08-31 NOTE — H&P ADULT - NSHPPHYSICALEXAM_GEN_ALL_CORE
VITALS:   T(C): 37.1 (08-31-21 @ 06:46), Max: 37.1 (08-31-21 @ 06:46)  HR: 69 (08-31-21 @ 09:50) (69 - 78)  BP: 154/74 (08-31-21 @ 09:50) (154/74 - 178/83)  RR: 20 (08-31-21 @ 09:50) (18 - 20)  SpO2: 97% (08-31-21 @ 09:50) (93% - 97%)    GENERAL: NAD, lying in bed comfortably  HEAD:  Atraumatic, Normocephalic  EYES: EOMI, PERRLA, conjunctiva and sclera clear  ENT: Moist mucous membranes  NECK: Supple, No JVD  CHEST/LUNG: Clear to auscultation bilaterally; No rales, rhonchi, wheezing, or rubs. Unlabored respirations. On 2 L NC   HEART: Regular rate and rhythm; No murmurs, rubs, or gallops  ABDOMEN: BSx4; Obese, epigastric tenderness on deep palpation, hepatomegaly   EXTREMITIES:  2+ Peripheral Pulses, brisk capillary refill. No clubbing, cyanosis, or edema  NERVOUS SYSTEM:  A&Ox3, no focal deficits   SKIN: B/L  Lower extremity petechial like rash R>L

## 2021-08-31 NOTE — CONSULT NOTE ADULT - ASSESSMENT
IMPRESSION:  UGIB likely due to esophageal varices  Cirrhosis due to GALLEGOS?  Hepatic nodule    PLAN:    CNS: avoid depressants    HEENT: Oral care    PULMONARY:  HOB @ 45 degrees    CARDIOVASCULAR: echo. cardio c/s    GI: GI prophylaxis. s/p PPI and octreotide. f/u EGD results. Ceftriaxone IV x 7 days for SBP ppx    RENAL:  Follow up lytes.  Correct as needed    INFECTIOUS DISEASE: Follow up cultures    HEMATOLOGICAL:  DVT prophylaxis. trend coags.     ENDOCRINE:  Follow up FS.  Insulin protocol if needed.    MUSCULOSKELETAL:         IMPRESSION:  Hemoptysis likely due to posterior nasal bleeding  Cirrhosis due to GALLEGOS?  small to moderate pericardial effusion  Hepatic nodule    PLAN:    CNS: avoid depressants    HEENT: Oral care. ENT eval    PULMONARY:  HOB @ 45 degrees    CARDIOVASCULAR: echo. cardio c/s    GI: GI prophylaxis. f/u GI recs    RENAL:  Follow up lytes.  Correct as needed    INFECTIOUS DISEASE: Follow up cultures    HEMATOLOGICAL:  DVT prophylaxis. trend coags.     ENDOCRINE:  Follow up FS.  Insulin protocol if needed.    MUSCULOSKELETAL:         IMPRESSION:  Posterior nasal bleeding ?  SP EGD.  Intubated for EGD.    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  Small to moderate pericardial effusion  Hepatic nodule    PLAN:    CNS: avoid depressants.  Keep sedated until extubated bu anesthesia     HEENT: Oral care. ENT eval    PULMONARY:  HOB @ 45 degrees    CARDIOVASCULAR: echo. cardio c/s.  Avoid volume overload     GI: GI prophylaxis. f/u GI recs    RENAL:  Follow up lytes.  Correct as needed    INFECTIOUS DISEASE: Follow up cultures    HEMATOLOGICAL:  DVT prophylaxis. FU CBC and Coags     ENDOCRINE:  Follow up FS.  Insulin protocol if needed.    MUSCULOSKELETAL:  Bed rest for now     ICU

## 2021-08-31 NOTE — H&P ADULT - ASSESSMENT
56 y M w/ PMH of PAF not on AC, Sleep apnea on CPAP, hypothyroidism, and PSHx of esophageal nodule removal 9 years ago presented to ED with dyspnea and hemoptysis. Vitals have been stable. CT angio PE negative for PE. CT abdomen/pelvis findings consistent with liver cirrhosis and splenomegaly .        #Liver Cirrhosis  #Hemoptysis   - pending endoscopy to r/o variceal bleed   -c/w  NPO , protonix gtt ,IV fluids   - f/u GI recs     #Microcytic Anemia   - hgb 10.6/ hct 34.3  -seems chronic  - iron studies pending       #PAF  -Continue with Cardizem 120 mg daily       #Hypothyroidism   -continue with synthroid 50mcg     #Sleep Apnea  -continue w/ CPAP at night     DVT: SCDs   GI: Protonix gtt   Diet: NPO - advanced per GI recs   Activity: IAT    56 y M w/ PMH of PAF not on AC, Sleep apnea on CPAP, hypothyroidism, and PSHx of esophageal nodule removal 9 years ago presented to ED with dyspnea and hemoptysis. Vitals have been stable. CT angio PE negative for PE. CT abdomen/pelvis findings consistent with liver cirrhosis and splenomegaly .        #Liver Cirrhosis  #Hemoptysis/Upper GI bleed  - pending endoscopy to r/o variceal bleed   -c/w  NPO , protonix gtt ,IV fluids   - f/u GI recs :CLD work up which includes HAV IgM/IgG, HBsAg, HBsAb, HBcAb IgM/IgG, HCV Ab, Anti HEV, Serum Ferritin, Transferrin Saturation, Ceruloplasmin level, CATHERINE, SMA, immunoglobulins panel, AMA, Anti LK microsomal Ab, anti-soluble liver Ag.     #Microcytic Anemia   - hgb 10.6/ hct 34.3  -seems chronic  - iron studies pending       #PAF  -Continue with Cardizem 120 mg daily       #Hypothyroidism   -continue with synthroid 50mcg     #Sleep Apnea  -continue w/ CPAP at night     DVT: SCDs   GI: Protonix gtt   Diet: NPO - advanced per GI recs   Activity: IAT

## 2021-08-31 NOTE — CONSULT NOTE ADULT - ASSESSMENT
56 year old male with PMH of Afib not on anticoagulation, Sleep apnea on CPAP @ night, thalassemia trait , PSH  of esophogeal nodule removed 9 years ago presenting with dyspnea and hemoptysis/hematemesis. Pt reports awoke with bright red blood on his pillow and face. Reports stood up and experienced more episodes of bright red blood with more than a table spoon, unable to decipher to if vomiting up blood or coughing up blood. Reports began to experience epigastric pain upon ED arrival.     #)Upper GI Bleed/Hematemesis: DD includes PUD vs esophageal varices vs AVM vs esophageal ulcer vs dieualfoy  #)?cirrhosis (Radiological evidence)  Hemodynamically stable   Basline Hb 11, Current Hb 10.6  Only on aspirin   CTA negative for bleed bust showed lobulated liver contour, splenomegaly consistent with cirrhosis    Rec:  Keep NPO  Maintain active type and screen.  Trend Hb q8hrs and Keep it > 8, transfuse PRBC if necessary  Adequate Fluid resuscitation (SBP > 90)  c/w IV PPI Drip, octreotide drip  Will plan for EGD today given findings of cirrhosis in CT scan and hematemesis to r/o variceal bleed    #)Mild transaminits hepatocellular likely due to NAFLD vs r/o CLD  -Elevated since last 1-2 years    Recs:   Trend LFT  avoid hepatotoxic medications  US abdomen   Please perform a CLD work up which includes HAV IgM/IgG, HBsAg, HBsAb, HBcAb IgM/IgG, HCV Ab, Anti HEV, Serum Ferritin, Transferrin Saturation, Ceruloplasmin level, CATHERINE, SMA, immunoglobulins panel, AMA, Anti LK microsomal Ab, anti-soluble liver Ag.

## 2021-08-31 NOTE — ED PROVIDER NOTE - CLINICAL SUMMARY MEDICAL DECISION MAKING FREE TEXT BOX
Patient presents with bleeding, unclear if from GI or lung from history. labs, ekg, cxr, ct done. Found to have advanced cirrhosis. Concern for varicele bleed. Patient hgb 10, baseline is 11. GI consulted. ceftriaxone, octreotide and protonix given. Discussed with ICU who states that patient is stable for the floor. Patient admitted for further management.

## 2021-08-31 NOTE — CHART NOTE - NSCHARTNOTEFT_GEN_A_CORE
PACU ANESTHESIA ADMISSION NOTE      Procedure: EGD; Fiberoptic endoscopy by ENT  Post op diagnosis:      ____  Intubated  TV:______       Rate: ______      FiO2: ______    _x___  Patent Airway    _x___  Full return of protective reflexes    _x___  Full recovery from anesthesia / back to baseline status    Vitals:            T:   98.4             BP :  142/84              R:   22           Sat:    99%           P:92      Mental Status:  _x___ Awake   _____ Alert   _____ Drowsy   _____ Sedated    Nausea/Vomiting:  _x___  NO       ______Yes,   See Post - Op Orders         Pain Scale (0-10):  __0___    Treatment: _x___ None    ____ See Post - Op/PCA Orders    Post - Operative Fluids:   ___ Oral   _x___ See Post - Op Orders    Plan: Discharge:   __Home       _____Floor     __x___Critical Care    __ICU___  Other:_________________    Comments:  No anesthesia issues or complications noted. Pt was intubated with Glydescope- atraumatic, no blood around vocal cords were noted.  Pt extubated , O2 Sat 98-99% on NC, however pt was complaining of difficulty breathing - pt's on continuous CPAP at home. Respiratory therapist notified an pt placed  on CPAP in ICU. VS stable. Report given to ICU team

## 2021-08-31 NOTE — ED ADULT NURSE NOTE - NSICDXPASTMEDICALHX_GEN_ALL_CORE_FT
PAST MEDICAL HISTORY:  Atrial fibrillation, unspecified type     Sciatica     Sleep apnea with use of continuous positive airway pressure (CPAP)

## 2021-08-31 NOTE — CONSULT NOTE ADULT - ATTENDING COMMENTS
57 yo M with a report of hematemesis?hemoptisis? with stable Hgb. Will perform EGD to rule out UGIB. PPI
Patient seen and examined in the endoscopy suite. No active current nasal, oral or pharyngeal bleeding seen on endoscopy performed at time of consultation.    Will follow.
IMPRESSION:  Posterior nasal bleeding ?  SP EGD.  Intubated for EGD.    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  Small to moderate pericardial effusion  Hepatic nodule    Plan as outlined above

## 2021-08-31 NOTE — ED PROVIDER NOTE - NS ED ROS FT
Constitutional: (-) fever (-) chills (-) (-) lightheadedness   Eyes/ENT: (-) blurry vision, (-) epistaxis (-) rhinorrhea (-) nasal congestion  Cardiovascular: (-) chest pain, (-) syncope (-) palpitations   Respiratory: (-) cough, (+) shortness of breath (-) pleurisy (+) hemoptysis  Gastrointestinal: (-) vomiting, (-) diarrhea (+) abdominal pain (-) nausea (-) anorexia   Musculoskeletal: (-) neck pain, (-) back pain, (-) joint pain (-) joint swelling (-) painful ROM  Integumentary: (-) rash, (-) edema (-) lacerations (-) pruritis   Neurological: (-) headache, (-) altered mental status (-) LOC (-) dizziness (-) paresthesias (-) gait abnormalities

## 2021-08-31 NOTE — ED PROVIDER NOTE - OBJECTIVE STATEMENT
56 y.o. Male , pmh of Afib not on anticoagulation, Sleep apnea on CPAP @ night, psh of esophogeal nodule removed 9 years ago presenting with dyspnea and hemoptysis. As 56 y.o. Male , pmh of Afib not on anticoagulation, Sleep apnea on CPAP @ night, psh of esophogeal nodule removed 9 years ago presenting with dyspnea and hemoptysis. GI Dr. Davis. Denies hx of alcohol usage, cp, palpitations, vomiting, diarrhea, constipation, inability to pass flatus. 56 y.o. Male , pmh of Afib not on anticoagulation, Sleep apnea on CPAP @ night, thalasemmia trait , psh of esophogeal nodule removed 9 years ago presenting with dyspnea and hemoptysis. GI Dr. Davis. Denies hx of alcohol usage, cp, palpitations, vomiting, diarrhea, constipation, inability to pass flatus. 56 y.o. Male , pmh of Afib not on anticoagulation, Sleep apnea on CPAP @ night, thalassemia trait , psh of esophogeal nodule removed 9 years ago presenting with dyspnea and hemoptysis. GI Dr. Davis. Denies hx of alcohol usage, cp, palpitations, vomiting, diarrhea, constipation, inability to pass flatus. 56 y.o. Male , pmh of Afib not on anticoagulation, Sleep apnea on CPAP @ night, thalassemia trait , psh of esophogeal nodule removed 9 years ago presenting with dyspnea and hemoptysis. Pt reports awoke with bright red blood on his pillow and face. Reports stood up and experienced more episodes of bright red blood, unable to decipher to if vomiting up blood or coughing up blood. Reports began to experience epigastric pain upon ED arrival. GI Dr. Davis. Denies hx of alcohol usage, cp, palpitations, vomiting, diarrhea, constipation, inability to pass flatus.

## 2021-08-31 NOTE — H&P ADULT - NSICDXFAMILYHX_GEN_ALL_CORE_FT
FAMILY HISTORY:  Father  Still living? Unknown  Family history of colon cancer in father, Age at diagnosis: Age Unknown

## 2021-08-31 NOTE — PATIENT PROFILE ADULT - FALL HARM RISK
PEDIATRIC CARDIOLOGY NOTE    Called by NICU staff to discuss this now 9 day old former 40 weeker who had a run of SVT this afternoon. Vicki Abbasi was born at 42 weeks following induction for PIH. He has had persistent tachypnea and been worked-up and treated for some degree of RDS and/or pneumonia/sepsis. He still has some oxygen requirement, but this afternoon had an episode of SVT (peak rate >300!). He broke with ice to the face and has been stable albeit a bit tachycardic since. 12 lead ECG now shows normal sinus rhythm with no pre-excitation or WPW. Rather minimal LV forces on the study but otherwise normal.    Recommend:  1. Continued observation and monitoring for recurrent SVT  2. Vagal maneuvers if SVT recurs  3. Echocardiogram in the morning to check for function and anatomic issues  4. Will follow-up after echocardiogram  5.   Will consider treatment if SVT recurs given the relatively fast rate seen earlier other

## 2021-08-31 NOTE — ED PROVIDER NOTE - PHYSICAL EXAMINATION
Physical Exam    Vital Signs: I have reviewed the initial vital signs.  Constitutional: well-nourished, appears stated age, no acute distress  Eyes: Conjunctiva pink, Sclera clear, PERRLA, EOMI, no ptosis, no entrapment, no racoon eyes.  Cardiovascular: S1 and S2, regular rate, regular rhythm, well-perfused extremities, radial pulses equal and 2+, calves nonttp, equal in size  Respiratory: unlabored respiratory effort, speaking in full sentences, handling oral secretions, clear to auscultation bilaterally no wheezing, rales and rhonchi  Gastrointestinal: soft, ttp in epigastrium, no pulsatile mass, normal bowl sounds  Musculoskeletal: supple neck, no lower extremity edema, no midline tenderness, paraspinal tenderness, clavicular creptius, painful rom, moving all extreities appropriately, no gross bony deformities or swelling.  Integumentary: warm, dry, no rashes, lacerations,  Neurologic: awake, alert, Physical Exam    Vital Signs: I have reviewed the initial vital signs.  Constitutional: well-nourished, appears stated age, no acute distress  Eyes: Conjunctiva pink, Sclera clear, PERRLA, EOMI, no ptosis, no entrapment, no racoon eyes.  Cardiovascular: S1 and S2, regular rate, regular rhythm, well-perfused extremities, radial pulses equal and 2+, calves nonttp, equal in size  Respiratory: unlabored respiratory effort, speaking in full sentences, handling oral secretions, clear to auscultation bilaterally no wheezing, rales and rhonchi  Gastrointestinal: soft, ttp in epigastrium, no pulsatile mass, normal bowl sounds  Musculoskeletal: supple neck, no lower extremity edema,  Integumentary: warm, dry, no rashes, lacerations,  Neurologic: awake, alert,

## 2021-08-31 NOTE — H&P ADULT - HISTORY OF PRESENT ILLNESS
56 y M w/ PMH of PAF not on AC, Sleep apnea on CPAP, hemorrhoids found during most recent colonoscopy,  hypothyroidism, and PSHx of esophageal nodule removal 9 years ago presented to ED with dyspnea and hemoptysis. States that when he woke up this morning, noticed blood and his pillow. When he got out bed of he began to spit up bright red blood, about a tablespoon's worth. Denies similar symptoms in the past. Denies family Hx of liver disease, IV drug use or alcohol use.  Currently denies abdominal pain, nausea, and vomiting.     In ED patient was slightly hypertensive, all other vitals were wnl. There was no evidence of PE in CT angio chest. CT Abdo/pelvis findings were consistent with cirrhosis with lobulated liver contour, enlarged spleen, and gynecomastia. Octreotide injection, and 1G Rocephin  were given.  Patient started on Protonix gtt.

## 2021-08-31 NOTE — CONSULT NOTE ADULT - SUBJECTIVE AND OBJECTIVE BOX
ENT: Pt is a 57y/o M admitted with hemoptysis. ENT consulted by GI intraop during Endoscopy for concerns of a posterior epistaxis. Pt seen and examined at bedside with Dr. Hugo. Unable to provide hx due to being intubated/sedated.     HPI:  56 y M w/ PMH of PAF not on AC, Sleep apnea on CPAP, hemorrhoids found during most recent colonoscopy,  hypothyroidism, and PSHx of esophageal nodule removal 9 years ago presented to ED with dyspnea and hemoptysis. States that when he woke up this morning, noticed blood and his pillow. When he got out bed of he began to spit up bright red blood, about a tablespoon's worth. Denies similar symptoms in the past. Denies family Hx of liver disease, IV drug use or alcohol use.  Currently denies abdominal pain, nausea, and vomiting.   In ED patient was slightly hypertensive, all other vitals were wnl. There was no evidence of PE in CT angio chest. CT Abdo/pelvis findings were consistent with cirrhosis with lobulated liver contour, enlarged spleen, and gynecomastia. Octreotide injection, and 1G Rocephin  were given.  Patient started on Protonix gtt.     (31 Aug 2021 13:24)    PAST MEDICAL & SURGICAL HISTORY:  Sleep apnea with use of continuous positive airway pressure (CPAP)  Atrial fibrillation, unspecified type  Sciatica  Hypothyroidism    Allergies  No Known Allergies    MEDICATIONS  (STANDING):  chlorhexidine 4% Liquid 1 Application(s) Topical <User Schedule>  pantoprazole  Injectable 40 milliGRAM(s) IV Push every 12 hours    FAMILY HISTORY:  Family history of colon cancer in father (Father)    Social History:  Marital Status:  ( x  )    (   ) Single    (   )    (  )   Lives with: (  ) alone  (  ) children   ( x ) spouse   (  ) parents  (  ) other  Substance Use (street drugs): Denies IV drug use. Reports occasional marijuana use   Tobacco Usage:  2-3 cigs/day for 10 years   Alcohol Usage: None (31 Aug 2021 13:24)    ROS:   Unable to provide hx due to intubation/sedation    Vital Signs Last 24 Hrs  T(C): 36.7 (31 Aug 2021 17:30), Max: 37.1 (31 Aug 2021 06:46)  T(F): 98 (31 Aug 2021 17:30), Max: 98.7 (31 Aug 2021 06:46)  HR: 76 (31 Aug 2021 17:30) (62 - 78)  BP: 153/98 (31 Aug 2021 17:30) (145/75 - 178/83)  BP(mean): 111 (31 Aug 2021 17:30) (111 - 111)  RR: 18 (31 Aug 2021 16:54) (18 - 20)  SpO2: 100% (31 Aug 2021 17:25) (93% - 100%)                        10.6   6.17  )-----------( 143      ( 31 Aug 2021 08:15 )             34.3     140  |  104  |  9<L>  ----------------------------<  144<H>  3.8   |  26  |  0.7    Ca    8.6      31 Aug 2021 08:15  TPro  6.8  /  Alb  4.1  /  TBili  0.9  /  DBili  x   /  AST  43<H>  /  ALT  43<H>  /  AlkPhos  59  08-31  PT/INR - ( 31 Aug 2021 10:47 )   PT: 13.50 sec;   INR: 1.18 ratio    PTT - ( 31 Aug 2021 10:47 )  PTT:41.7 sec    PHYSICAL EXAM:  Gen: intubated, sedated  Skin: No rashes, bruises, or lesions  HEENT: Head NC/AT. Nares bilaterally patent, no blood or discharge noted. ET Tube obstructing view of oral cavity. Neck supple, trachea midline. Flexible Fiberoptic Laryngoscopy performed by myself and ENT attending Dr. Hugo at bedside, no source of bleed identified intranasally or in the posterior oropharynx; was unable to visualize cords due to ET tube protecting airway, but no blood noted around tube.  Resp: breathing easily, no stridor, no accessory muscle use   CV: no peripheral edema/cyanosis  GI: soft, nontender, nondistended  Neuro: A&Ox3  Psych: normal mood, normal affect

## 2021-08-31 NOTE — ED PROVIDER NOTE - PROGRESS NOTE DETAILS
plan for GI workup, protonix, cardiac monitoring, will consult GI. dc: plan for EGD. Ceftriaxone and ocreotide given. dc: discussed case with icu fellow approval line, no indication for ICU at this time. VSS, Hgb stable not oxygen dependent. dc: plan for EGD. Ceftriaxone and octreotide given. dc: discussed case with ICU fellow approval line, no indication for ICU at this time. VSS, Hgb stable not oxygen dependent. I was directly involved in the management of this patient. Case was discussed with PA Fellow Delmy

## 2021-09-01 LAB
ALBUMIN SERPL ELPH-MCNC: 3.9 G/DL — SIGNIFICANT CHANGE UP (ref 3.5–5.2)
ALBUMIN SERPL ELPH-MCNC: 4.1 G/DL — SIGNIFICANT CHANGE UP (ref 3.5–5.2)
ALP SERPL-CCNC: 57 U/L — SIGNIFICANT CHANGE UP (ref 30–115)
ALP SERPL-CCNC: 58 U/L — SIGNIFICANT CHANGE UP (ref 30–115)
ALT FLD-CCNC: 40 U/L — SIGNIFICANT CHANGE UP (ref 0–41)
ALT FLD-CCNC: 50 U/L — HIGH (ref 0–41)
ANION GAP SERPL CALC-SCNC: 12 MMOL/L — SIGNIFICANT CHANGE UP (ref 7–14)
ANION GAP SERPL CALC-SCNC: 13 MMOL/L — SIGNIFICANT CHANGE UP (ref 7–14)
APTT BLD: 35.6 SEC — SIGNIFICANT CHANGE UP (ref 27–39.2)
AST SERPL-CCNC: 36 U/L — SIGNIFICANT CHANGE UP (ref 0–41)
AST SERPL-CCNC: 54 U/L — HIGH (ref 0–41)
BASOPHILS # BLD AUTO: 0.02 K/UL — SIGNIFICANT CHANGE UP (ref 0–0.2)
BASOPHILS # BLD AUTO: 0.04 K/UL — SIGNIFICANT CHANGE UP (ref 0–0.2)
BASOPHILS NFR BLD AUTO: 0.2 % — SIGNIFICANT CHANGE UP (ref 0–1)
BASOPHILS NFR BLD AUTO: 0.3 % — SIGNIFICANT CHANGE UP (ref 0–1)
BILIRUB SERPL-MCNC: 0.8 MG/DL — SIGNIFICANT CHANGE UP (ref 0.2–1.2)
BILIRUB SERPL-MCNC: 1 MG/DL — SIGNIFICANT CHANGE UP (ref 0.2–1.2)
BUN SERPL-MCNC: 13 MG/DL — SIGNIFICANT CHANGE UP (ref 10–20)
BUN SERPL-MCNC: 17 MG/DL — SIGNIFICANT CHANGE UP (ref 10–20)
CALCIUM SERPL-MCNC: 8.6 MG/DL — SIGNIFICANT CHANGE UP (ref 8.5–10.1)
CALCIUM SERPL-MCNC: 8.8 MG/DL — SIGNIFICANT CHANGE UP (ref 8.5–10.1)
CERULOPLASMIN SERPL-MCNC: 22 MG/DL — SIGNIFICANT CHANGE UP (ref 15–30)
CHLORIDE SERPL-SCNC: 101 MMOL/L — SIGNIFICANT CHANGE UP (ref 98–110)
CHLORIDE SERPL-SCNC: 104 MMOL/L — SIGNIFICANT CHANGE UP (ref 98–110)
CO2 SERPL-SCNC: 23 MMOL/L — SIGNIFICANT CHANGE UP (ref 17–32)
CO2 SERPL-SCNC: 24 MMOL/L — SIGNIFICANT CHANGE UP (ref 17–32)
COVID-19 SPIKE DOMAIN AB INTERP: POSITIVE
COVID-19 SPIKE DOMAIN ANTIBODY RESULT: >250 U/ML — HIGH
CREAT SERPL-MCNC: 0.8 MG/DL — SIGNIFICANT CHANGE UP (ref 0.7–1.5)
CREAT SERPL-MCNC: 0.9 MG/DL — SIGNIFICANT CHANGE UP (ref 0.7–1.5)
EOSINOPHIL # BLD AUTO: 0 K/UL — SIGNIFICANT CHANGE UP (ref 0–0.7)
EOSINOPHIL # BLD AUTO: 0.08 K/UL — SIGNIFICANT CHANGE UP (ref 0–0.7)
EOSINOPHIL NFR BLD AUTO: 0 % — SIGNIFICANT CHANGE UP (ref 0–8)
EOSINOPHIL NFR BLD AUTO: 0.6 % — SIGNIFICANT CHANGE UP (ref 0–8)
FERRITIN SERPL-MCNC: 346 NG/ML — SIGNIFICANT CHANGE UP (ref 30–400)
GLUCOSE SERPL-MCNC: 156 MG/DL — HIGH (ref 70–99)
GLUCOSE SERPL-MCNC: 164 MG/DL — HIGH (ref 70–99)
HAV IGG SER QL IA: SIGNIFICANT CHANGE UP
HAV IGG SER QL IA: SIGNIFICANT CHANGE UP
HAV IGM SER-ACNC: SIGNIFICANT CHANGE UP
HBV CORE IGM SER-ACNC: SIGNIFICANT CHANGE UP
HBV SURFACE AB SER-ACNC: SIGNIFICANT CHANGE UP
HBV SURFACE AB SER-ACNC: SIGNIFICANT CHANGE UP
HBV SURFACE AG SER-ACNC: SIGNIFICANT CHANGE UP
HCT VFR BLD CALC: 33.4 % — LOW (ref 42–52)
HCT VFR BLD CALC: 34.1 % — LOW (ref 42–52)
HCV AB S/CO SERPL IA: 0.14 S/CO — SIGNIFICANT CHANGE UP (ref 0–0.99)
HCV AB SERPL-IMP: SIGNIFICANT CHANGE UP
HCV RNA SPEC NAA+PROBE-LOG IU: SIGNIFICANT CHANGE UP IU/ML
HCV RNA SPEC NAA+PROBE-LOG IU: SIGNIFICANT CHANGE UP LOGIU/ML
HGB BLD-MCNC: 10.4 G/DL — LOW (ref 14–18)
HGB BLD-MCNC: 10.5 G/DL — LOW (ref 14–18)
IMM GRANULOCYTES NFR BLD AUTO: 0.5 % — HIGH (ref 0.1–0.3)
IMM GRANULOCYTES NFR BLD AUTO: 0.6 % — HIGH (ref 0.1–0.3)
INR BLD: 1.22 RATIO — SIGNIFICANT CHANGE UP (ref 0.65–1.3)
IRON SATN MFR SERPL: 30 % — SIGNIFICANT CHANGE UP (ref 15–50)
IRON SATN MFR SERPL: 77 UG/DL — SIGNIFICANT CHANGE UP (ref 35–150)
LACTATE SERPL-SCNC: 2.4 MMOL/L — HIGH (ref 0.7–2)
LYMPHOCYTES # BLD AUTO: 0.87 K/UL — LOW (ref 1.2–3.4)
LYMPHOCYTES # BLD AUTO: 16.4 % — LOW (ref 20.5–51.1)
LYMPHOCYTES # BLD AUTO: 2.06 K/UL — SIGNIFICANT CHANGE UP (ref 1.2–3.4)
LYMPHOCYTES # BLD AUTO: 9.8 % — LOW (ref 20.5–51.1)
MAGNESIUM SERPL-MCNC: 2 MG/DL — SIGNIFICANT CHANGE UP (ref 1.8–2.4)
MAGNESIUM SERPL-MCNC: 2 MG/DL — SIGNIFICANT CHANGE UP (ref 1.8–2.4)
MCHC RBC-ENTMCNC: 20.3 PG — LOW (ref 27–31)
MCHC RBC-ENTMCNC: 20.6 PG — LOW (ref 27–31)
MCHC RBC-ENTMCNC: 30.8 G/DL — LOW (ref 32–37)
MCHC RBC-ENTMCNC: 31.1 G/DL — LOW (ref 32–37)
MCV RBC AUTO: 66 FL — LOW (ref 80–94)
MCV RBC AUTO: 66 FL — LOW (ref 80–94)
MONOCYTES # BLD AUTO: 0.24 K/UL — SIGNIFICANT CHANGE UP (ref 0.1–0.6)
MONOCYTES # BLD AUTO: 0.72 K/UL — HIGH (ref 0.1–0.6)
MONOCYTES NFR BLD AUTO: 2.7 % — SIGNIFICANT CHANGE UP (ref 1.7–9.3)
MONOCYTES NFR BLD AUTO: 5.7 % — SIGNIFICANT CHANGE UP (ref 1.7–9.3)
NEUTROPHILS # BLD AUTO: 7.7 K/UL — HIGH (ref 1.4–6.5)
NEUTROPHILS # BLD AUTO: 9.59 K/UL — HIGH (ref 1.4–6.5)
NEUTROPHILS NFR BLD AUTO: 76.4 % — HIGH (ref 42.2–75.2)
NEUTROPHILS NFR BLD AUTO: 86.8 % — HIGH (ref 42.2–75.2)
NRBC # BLD: 0 /100 WBCS — SIGNIFICANT CHANGE UP (ref 0–0)
NRBC # BLD: 0 /100 WBCS — SIGNIFICANT CHANGE UP (ref 0–0)
PHOSPHATE SERPL-MCNC: 2.9 MG/DL — SIGNIFICANT CHANGE UP (ref 2.1–4.9)
PHOSPHATE SERPL-MCNC: 3.6 MG/DL — SIGNIFICANT CHANGE UP (ref 2.1–4.9)
PLATELET # BLD AUTO: 174 K/UL — SIGNIFICANT CHANGE UP (ref 130–400)
PLATELET # BLD AUTO: 186 K/UL — SIGNIFICANT CHANGE UP (ref 130–400)
POTASSIUM SERPL-MCNC: 3.5 MMOL/L — SIGNIFICANT CHANGE UP (ref 3.5–5)
POTASSIUM SERPL-MCNC: 4.3 MMOL/L — SIGNIFICANT CHANGE UP (ref 3.5–5)
POTASSIUM SERPL-SCNC: 3.5 MMOL/L — SIGNIFICANT CHANGE UP (ref 3.5–5)
POTASSIUM SERPL-SCNC: 4.3 MMOL/L — SIGNIFICANT CHANGE UP (ref 3.5–5)
PROT SERPL-MCNC: 6.5 G/DL — SIGNIFICANT CHANGE UP (ref 6–8)
PROT SERPL-MCNC: 6.8 G/DL — SIGNIFICANT CHANGE UP (ref 6–8)
PROTHROM AB SERPL-ACNC: 14 SEC — HIGH (ref 9.95–12.87)
RBC # BLD: 5.06 M/UL — SIGNIFICANT CHANGE UP (ref 4.7–6.1)
RBC # BLD: 5.17 M/UL — SIGNIFICANT CHANGE UP (ref 4.7–6.1)
RBC # FLD: 16.5 % — HIGH (ref 11.5–14.5)
RBC # FLD: 16.9 % — HIGH (ref 11.5–14.5)
SARS-COV-2 IGG+IGM SERPL QL IA: >250 U/ML — HIGH
SARS-COV-2 IGG+IGM SERPL QL IA: POSITIVE
SODIUM SERPL-SCNC: 137 MMOL/L — SIGNIFICANT CHANGE UP (ref 135–146)
SODIUM SERPL-SCNC: 140 MMOL/L — SIGNIFICANT CHANGE UP (ref 135–146)
TIBC SERPL-MCNC: 253 UG/DL — SIGNIFICANT CHANGE UP (ref 220–430)
TRANSFERRIN SERPL-MCNC: 213 MG/DL — SIGNIFICANT CHANGE UP (ref 200–360)
UIBC SERPL-MCNC: 176 UG/DL — SIGNIFICANT CHANGE UP (ref 110–370)
WBC # BLD: 12.56 K/UL — HIGH (ref 4.8–10.8)
WBC # BLD: 8.87 K/UL — SIGNIFICANT CHANGE UP (ref 4.8–10.8)
WBC # FLD AUTO: 12.56 K/UL — HIGH (ref 4.8–10.8)
WBC # FLD AUTO: 8.87 K/UL — SIGNIFICANT CHANGE UP (ref 4.8–10.8)

## 2021-09-01 PROCEDURE — 99233 SBSQ HOSP IP/OBS HIGH 50: CPT

## 2021-09-01 PROCEDURE — 99231 SBSQ HOSP IP/OBS SF/LOW 25: CPT

## 2021-09-01 PROCEDURE — 71045 X-RAY EXAM CHEST 1 VIEW: CPT | Mod: 26

## 2021-09-01 PROCEDURE — 93880 EXTRACRANIAL BILAT STUDY: CPT | Mod: 26

## 2021-09-01 PROCEDURE — 93306 TTE W/DOPPLER COMPLETE: CPT | Mod: 26

## 2021-09-01 RX ORDER — DILTIAZEM HCL 120 MG
120 CAPSULE, EXT RELEASE 24 HR ORAL DAILY
Refills: 0 | Status: DISCONTINUED | OUTPATIENT
Start: 2021-09-01 | End: 2021-09-03

## 2021-09-01 RX ORDER — PANTOPRAZOLE SODIUM 20 MG/1
40 TABLET, DELAYED RELEASE ORAL
Refills: 0 | Status: DISCONTINUED | OUTPATIENT
Start: 2021-09-02 | End: 2021-09-02

## 2021-09-01 RX ORDER — DIAZEPAM 5 MG
10 TABLET ORAL AT BEDTIME
Refills: 0 | Status: DISCONTINUED | OUTPATIENT
Start: 2021-09-01 | End: 2021-09-03

## 2021-09-01 RX ADMIN — PANTOPRAZOLE SODIUM 40 MILLIGRAM(S): 20 TABLET, DELAYED RELEASE ORAL at 06:30

## 2021-09-01 RX ADMIN — Medication 10 MILLIGRAM(S): at 22:03

## 2021-09-01 RX ADMIN — Medication 120 MILLIGRAM(S): at 10:32

## 2021-09-01 NOTE — CONSULT NOTE ADULT - SUBJECTIVE AND OBJECTIVE BOX
Patient is a 56y old  Male who presents with a chief complaint of Hemoptysis (01 Sep 2021 09:05)      HPI:  56 y M w/ PMH of PAF not on AC, Sleep apnea on CPAP, hemorrhoids found during most recent colonoscopy,  hypothyroidism, and PSHx of esophageal nodule removal 9 years ago presented to ED with dyspnea and hemoptysis. States that when he woke up this morning, noticed blood and his pillow. When he got out bed of he began to spit up bright red blood, about a tablespoon's worth. Denies similar symptoms in the past. Denies family Hx of liver disease, IV drug use or alcohol use.  Currently denies abdominal pain, nausea, and vomiting.     In ED patient was slightly hypertensive, all other vitals were wnl. There was no evidence of PE in CT angio chest. CT Abdo/pelvis findings were consistent with cirrhosis with lobulated liver contour, enlarged spleen, and gynecomastia. Octreotide injection, and 1G Rocephin  were given.  Patient started on Protonix gtt.     (31 Aug 2021 13:24)      PAST MEDICAL & SURGICAL HISTORY:  Sleep apnea with use of continuous positive airway pressure (CPAP)    Atrial fibrillation, unspecified type    Sciatica    Hypothyroidism        PREVIOUS DIAGNOSTIC TESTING:      ECHO  FINDINGS:    STRESS  FINDINGS:    CATHETERIZATION  FINDINGS:    MEDICATIONS  (STANDING):  chlorhexidine 4% Liquid 1 Application(s) Topical <User Schedule>  diltiazem    milliGRAM(s) Oral daily    MEDICATIONS  (PRN):      FAMILY HISTORY:  Family history of colon cancer in father (Father)        SOCIAL HISTORY:  CIGARETTES:    ALCOHOL:    REVIEW OF SYSTEMS:  CONSTITUTIONAL: No fever, weight loss, or fatigue  NECK: No pain or stiffness  RESPIRATORY: No cough, wheezing, chills or hemoptysis; No shortness of breath  CARDIOVASCULAR: No chest pain, palpitations, dizziness, or leg swelling  GASTROINTESTINAL: No abdominal or epigastric pain. No nausea, vomiting, or hematemesis; No diarrhea or constipation. No melena or hematochezia.  GENITOURINARY: No dysuria, frequency, hematuria, or incontinence  NEUROLOGICAL: No headaches, memory loss, loss of strength, numbness, or tremors  SKIN: No itching, burning, rashes, or lesions   ENDOCRINE: No heat or cold intolerance; No hair loss  MUSCULOSKELETAL: No joint pain or swelling; No muscle, back, or extremity pain  HEME/LYMPH: No easy bruising, or bleeding gums          Vital Signs Last 24 Hrs  T(C): 36.5 (01 Sep 2021 08:00), Max: 36.7 (31 Aug 2021 17:30)  T(F): 97.7 (01 Sep 2021 08:00), Max: 98.1 (01 Sep 2021 04:00)  HR: 90 (01 Sep 2021 11:00) (60 - 90)  BP: 145/68 (01 Sep 2021 11:00) (132/60 - 175/87)  BP(mean): 69 (01 Sep 2021 11:00) (69 - 128)  RR: 20 (01 Sep 2021 11:00) (15 - 36)  SpO2: 98% (01 Sep 2021 11:44) (87% - 100%)        PHYSICAL EXAM:  GENERAL: NAD, well-groomed, well-developed  HEAD:  Atraumatic, Normocephalic  NECK: Supple, No JVD, Normal thyroid  NERVOUS SYSTEM:  Alert & Oriented X3, Good concentration  CHEST/LUNG: Clear to percussion bilaterally; No rales, rhonchi, wheezing, or rubs  HEART: Regular rate and rhythm; No murmurs, rubs, or gallops  ABDOMEN: Soft, Nontender, Nondistended; Bowel sounds present  EXTREMITIES:  2+ Peripheral Pulses, No clubbing, cyanosis, or edema  SKIN: No rashes or lesions    INTERPRETATION OF TELEMETRY:    ECG:    I&O's Detail    31 Aug 2021 07:01  -  01 Sep 2021 07:00  --------------------------------------------------------  IN:    Oral Fluid: 240 mL  Total IN: 240 mL    OUT:    Voided (mL): 1200 mL  Total OUT: 1200 mL    Total NET: -960 mL      01 Sep 2021 07:01  -  01 Sep 2021 11:46  --------------------------------------------------------  IN:    Oral Fluid: 240 mL  Total IN: 240 mL    OUT:    Voided (mL): 650 mL  Total OUT: 650 mL    Total NET: -410 mL          LABS:                        10.5   8.87  )-----------( 174      ( 01 Sep 2021 04:51 )             34.1     09-01    137  |  101  |  13  ----------------------------<  156<H>  4.3   |  24  |  0.9    Ca    8.6      01 Sep 2021 04:51  Phos  2.9     09-01  Mg     2.0     09-01    TPro  6.8  /  Alb  4.1  /  TBili  1.0  /  DBili  x   /  AST  54<H>  /  ALT  50<H>  /  AlkPhos  58  09-01    CARDIAC MARKERS ( 31 Aug 2021 08:15 )  x     / <0.01 ng/mL / x     / x     / x          PT/INR - ( 01 Sep 2021 04:51 )   PT: 14.00 sec;   INR: 1.22 ratio         PTT - ( 01 Sep 2021 04:51 )  PTT:35.6 sec    I&O's Summary    31 Aug 2021 07:01  -  01 Sep 2021 07:00  --------------------------------------------------------  IN: 240 mL / OUT: 1200 mL / NET: -960 mL    01 Sep 2021 07:01  -  01 Sep 2021 11:46  --------------------------------------------------------  IN: 240 mL / OUT: 650 mL / NET: -410 mL        RADIOLOGY & ADDITIONAL STUDIES:

## 2021-09-01 NOTE — PROGRESS NOTE ADULT - ASSESSMENT
ASSESSMENT & PLAN:  Patient is a 56 yom with a PMH of Obesity class III, A-Fib (s/p 2x cardioversion in 2008,2010, rythm controlled on Cardizem and Aspirin 81mg), YANIRA on C-pap at come (compliant), 4 pack year smoking history, 3x a week marijuana smoker (for decades) and hypothyroid (synthroid) presented to the ED on 8/31 after he woke up with "teaspoons of blood" on his pillow. When he opened his mouth dried blood was caked over his oropharynx (as per spouse) and more blood came out when he cleared his throat. In the ED endoscopy did not show an active bleed but did identify a 20mm esophageal mass that patient had been told about ~9 years ago was 'fatty tissue'. CTA CHEST did not show any active bleed or varices. GI ruled out Upper GI bleed. ENT assessed the patient and did not find any posterior nasal bleeding. Patient is followed by Dr Montejo out patient, dr was contacted about his presence, awaiting his call back. Will contact again if no response by end of day.     Problem List:    #Blood per mouth STOPPED likely secondary to hemoptesis vs hematemesis vs posterior nose bleed  -endoscopy did not identify an active bleed, varices or ulcerations  -CTA did not show extravisation  -continue to monitor H&H  -transfuse if Hemoglobin <7  -discussed symptoms of occult bleed with patient (metalic taste in mouth, nausea/diarrhea, black tarry stool/vomit, Bright red blood in stool/vomit, dizziness, palpitations etc) and told to keep a low threshold in contacting his PCP regarding this  -CT angio abdomen PENDING   -Echo Ordered as per dr Montejo     #Esophageal nodule, 20mm  patient was told of a "small fatty" nodule ~9 years ago that he did not follow up on.  patient denies dysphagia or difficulty swallowing solids  unlikely to be source of bleed  f/u out patient    #mild Transaminitis  AST ALT in 50's, nodular liver, Patient is a non-drinker and obese, likely GALLEGOS  daily LFTs in patient and abdominal exam  f/u outpatient     #Mild Pericardial Effusion    #A-Fib  c/w rate control diltiazem  -f/u Carotid duplex    #Hypothyroid  c/w synthroid  #Nicotine dependence  #Marijuana dependence     #Misc  - DVT Prophylaxis: bilateral ICD   - GI Prophylaxis: protonix 40 mg   - Diet: DASH/TLC  - Activity: out of bed to chair   - IV Fluids: none   - Code Status: Full     Dispo:  Downgrade to floor ASSESSMENT & PLAN:  Patient is a 56 yom with a PMH of Obesity class III, A-Fib (s/p 2x cardioversion in 2008,2010, rythm controlled on Cardizem and Aspirin 81mg), YANIRA on C-pap at come (compliant), 4 pack year smoking history, 3x a week marijuana smoker (for decades) and hypothyroid (synthroid) presented to the ED on 8/31 after he woke up with "teaspoons of blood" on his pillow. When he opened his mouth dried blood was caked over his oropharynx (as per spouse) and more blood came out when he cleared his throat. In the ED endoscopy did not show an active bleed but did identify a 20mm esophageal mass that patient had been told about ~9 years ago was 'fatty tissue'. CTA CHEST did not show any active bleed or varices. GI ruled out Upper GI bleed. ENT assessed the patient and did not find any posterior nasal bleeding. Patient is followed by Dr Montejo out patient, dr was contacted about his presence, awaiting his call back. Will contact again if no response by end of day.     Problem List:    #Blood per mouth STOPPED likely secondary to hemoptesis vs hematemesis vs posterior nose bleed  -endoscopy did not identify an active bleed, varices or ulcerations  -CTA did not show extravisation  -continue to monitor H&H  -transfuse if Hemoglobin <7  -discussed symptoms of occult bleed with patient (metalic taste in mouth, nausea/diarrhea, black tarry stool/vomit, Bright red blood in stool/vomit, dizziness, palpitations etc) and told to keep a low threshold in contacting his PCP regarding this  -Echo Ordered as per dr Montejo     #Esophageal nodule, 20mm  patient was told of a "small fatty" nodule ~9 years ago that he did not follow up on.  patient denies dysphagia or difficulty swallowing solids  unlikely to be source of bleed  f/u out patient    #mild Transaminitis  AST ALT in 50's, nodular liver, Patient is a non-drinker and obese, likely GALLEGOS  daily LFTs in patient and abdominal exam  f/u outpatient     #Mild Pericardial Effusion    #A-Fib  c/w rate control diltiazem  -f/u Carotid duplex    #Hypothyroid  c/w synthroid  #Nicotine dependence  #Marijuana dependence     #Misc  - DVT Prophylaxis: bilateral ICD   - GI Prophylaxis: protonix 40 mg   - Diet: DASH/TLC  - Activity: out of bed to chair   - IV Fluids: none   - Code Status: Full     Dispo:  Downgrade to floor

## 2021-09-01 NOTE — PROGRESS NOTE ADULT - ATTENDING COMMENTS
55 yo M with a report of hematemesis? hemoptysis? with stable Hgb. Imaging suggestive of cirrhosis with portal HTN (splenomegaly). No varices noted on EGD yesterday. In the area of the previously removed esophageal polyp a 1-2cm injected polyp was noted. No blood was noted in the stomach. Possible source of bleeding? oropharynx/larynx? vs nodule (less likely), given symptoms and out of concern of possible underlying malignant potential EMR of the nodule will be performed tomorrow.

## 2021-09-01 NOTE — CHART NOTE - NSCHARTNOTEFT_GEN_A_CORE
MICU Transfer Note    Transfer from: ICU  Transfer to:  ( X ) Medicine    (  ) Telemetry    (  ) RCU    (  ) Palliative    (  ) Stroke Unit    (  ) _______________  Accepting physican:    MEDICATIONS:  STANDING MEDICATIONS  chlorhexidine 4% Liquid 1 Application(s) Topical <User Schedule>  diltiazem    milliGRAM(s) Oral daily    PRN MEDICATIONS      VITAL SIGNS: Last 24 Hours  T(C): 36.5 (01 Sep 2021 08:00), Max: 36.7 (31 Aug 2021 17:30)  T(F): 97.7 (01 Sep 2021 08:00), Max: 98.1 (01 Sep 2021 04:00)  HR: 90 (01 Sep 2021 11:00) (60 - 90)  BP: 145/68 (01 Sep 2021 11:00) (132/60 - 175/87)  BP(mean): 69 (01 Sep 2021 11:00) (69 - 128)  RR: 20 (01 Sep 2021 11:00) (15 - 36)  SpO2: 98% (01 Sep 2021 11:44) (87% - 100%)    LABS:                        10.5   8.87  )-----------( 174      ( 01 Sep 2021 04:51 )             34.1     09-01    137  |  101  |  13  ----------------------------<  156<H>  4.3   |  24  |  0.9    Ca    8.6      01 Sep 2021 04:51  Phos  2.9     09-01  Mg     2.0     09-01    TPro  6.8  /  Alb  4.1  /  TBili  1.0  /  DBili  x   /  AST  54<H>  /  ALT  50<H>  /  AlkPhos  58  09-01    PT/INR - ( 01 Sep 2021 04:51 )   PT: 14.00 sec;   INR: 1.22 ratio         PTT - ( 01 Sep 2021 04:51 )  PTT:35.6 sec        CARDIAC MARKERS ( 31 Aug 2021 08:15 )  x     / <0.01 ng/mL / x     / x     / x          RADIOLOGY:  < from: Xray Chest 1 View- PORTABLE-Routine (Xray Chest 1 View- PORTABLE-Routine in AM.) (09.01.21 @ 06:37) >  Impression:    No radiographic evidence of acute pulmonary process.      < from: CT Angio Chest PE Protocol w/ IV Cont (08.31.21 @ 08:53) >  CHEST: There is no evidence of central pulmonary embolism. The thoracic aorta is normal in caliber. There is coronary artery calcifications. The heart size within normal limits. There is a small to moderate pericardial effusion. There is no thoracic lymphadenopathy.   The thyroid Is present, incompletely evaluated on CT. The central tracheobronchial tree is patent. There is no consolidation, pneumothorax or pleural effusion. There is dependent atelectasis and patchy areas of subsegmental atelectasis. There is mild bilateral gynecomastia    HEPATOBILIARY: The left liver is lobulated incontour. There is a 1 cm right hepatic hypodensity on image 27 of series 6, incompletely characterized. Further evaluation with outpatient MRI with gadolinium is recommended. The gallbladder is distended.    SPLEEN: The spleen is enlarged measuring 16 cm.    PANCREAS: Unremarkable..    ADRENAL GLANDS: There is nodular thickening of the left adrenal gland. The right adrenal gland is unremarkable.    KIDNEYS: Subcentimeter hypodensities are too small to characterize. There is no hydronephrosis.    ABDOMINOPELVIC NODES: Unremarkable..    PELVIC ORGANS: There are prostate calcifications. There are small bilateral fat-containing inguinal hernias    PERITONEUM/MESENTERY/BOWEL: There is no free air or obstruction. The appendix is unremarkable.    BONES/SOFT TISSUES: Degenerative change. Old left rib fracture...    OTHER: Vascular calcifications are present..      ECHO PENDING  CT ABDOMEN W IV CONTRAST PENDING         CCU COURSE:  Patient admitted 8/31  GI and ENT eval performed, no active bleed identified. See above for esophageal mass  Type and screen ordered          ASSESSMENT & PLAN:   Patient is a 56 yom with a PMH of Obesity class III, A-Fib (s/p 2x cardioversion in 2008,2010, rythm controlled on Cardizem and Aspirin 81mg), YANIRA on C-pap at come (compliant), 4 pack year smoking history, 3x a week marijuana smoker (for decades) and hypothyroid (synthroid) presented to the ED on 8/31 after he woke up with "teaspoons of blood" on his pillow. When he opened his mouth dried blood was caked over his oropharynx (as per spouse) and more blood came out when he cleared his throat. In the ED endoscopy did not show an active bleed but did identify a 20mm esophageal mass that patient had been told about ~9 years ago was 'fatty tissue'. CTA CHEST did not show any active bleed or varices. GI ruled out Upper GI bleed. ENT assessed the patient and did not find any posterior nasal bleeding. Patient is followed by Dr Montejo out patient, dr was contacted about his presence, awaiting his call back. Will contact again if no response by end of day.     Problem List:    #Blood per mouth STOPPED likely secondary to hemoptesis vs hematemesis vs posterior nose bleed  -endoscopy did not identify an active bleed, varices or ulcerations  -CTA did not show extravisation  -continue to monitor H&H  -transfuse if Hemoglobin <7  -discussed symptoms of occult bleed with patient (metalic taste in mouth, nausea/diarrhea, black tarry stool/vomit, Bright red blood in stool/vomit, dizziness, palpitations etc) and told to keep a low threshold in contacting his PCP regarding this  -CT angio abdomen PENDING   -Echo Ordered as per dr Montejo     #Esophageal nodule, 20mm  patient was told of a "small fatty" nodule ~9 years ago that he did not follow up on.  patient denies dysphagia or difficulty swallowing solids  unlikely to be source of bleed  f/u out patient    #mild Transaminitis  AST ALT in 50's, nodular liver, Patient is a non-drinker and obese, likely GALLEGOS  daily LFTs in patient and abdominal exam  f/u outpatient     #Mild Pericardial Effusion    #A-Fib  c/w rate control diltiazem  -f/u Carotid duplex    #Hypothyroid  c/w synthroid  #Nicotine dependence  #Marijuana dependence     #Misc  - DVT Prophylaxis: bilateral ICD   - GI Prophylaxis: protonix 40 mg   - Diet: DASH/TLC  - Activity: out of bed to chair   - IV Fluids: none   - Code Status: Full     Dispo:  Downgrade to floor      For Follow-Up:  Echo of heart for pericardial effusion  CT Abdomen w IV contrast  ENT recommendations for possible posterior nasal bleed.   LFTs MICU Transfer Note    Transfer from: ICU  Transfer to:  ( X ) Medicine    (  ) Telemetry    (  ) RCU    (  ) Palliative    (  ) Stroke Unit    (  ) _______________  Accepting physican:    MEDICATIONS:  STANDING MEDICATIONS  chlorhexidine 4% Liquid 1 Application(s) Topical <User Schedule>  diltiazem    milliGRAM(s) Oral daily    PRN MEDICATIONS      VITAL SIGNS: Last 24 Hours  T(C): 36.5 (01 Sep 2021 08:00), Max: 36.7 (31 Aug 2021 17:30)  T(F): 97.7 (01 Sep 2021 08:00), Max: 98.1 (01 Sep 2021 04:00)  HR: 90 (01 Sep 2021 11:00) (60 - 90)  BP: 145/68 (01 Sep 2021 11:00) (132/60 - 175/87)  BP(mean): 69 (01 Sep 2021 11:00) (69 - 128)  RR: 20 (01 Sep 2021 11:00) (15 - 36)  SpO2: 98% (01 Sep 2021 11:44) (87% - 100%)    LABS:                        10.5   8.87  )-----------( 174      ( 01 Sep 2021 04:51 )             34.1     09-01    137  |  101  |  13  ----------------------------<  156<H>  4.3   |  24  |  0.9    Ca    8.6      01 Sep 2021 04:51  Phos  2.9     09-01  Mg     2.0     09-01    TPro  6.8  /  Alb  4.1  /  TBili  1.0  /  DBili  x   /  AST  54<H>  /  ALT  50<H>  /  AlkPhos  58  09-01    PT/INR - ( 01 Sep 2021 04:51 )   PT: 14.00 sec;   INR: 1.22 ratio         PTT - ( 01 Sep 2021 04:51 )  PTT:35.6 sec        CARDIAC MARKERS ( 31 Aug 2021 08:15 )  x     / <0.01 ng/mL / x     / x     / x          RADIOLOGY:  < from: Xray Chest 1 View- PORTABLE-Routine (Xray Chest 1 View- PORTABLE-Routine in AM.) (09.01.21 @ 06:37) >  Impression:    No radiographic evidence of acute pulmonary process.      < from: CT Angio Chest PE Protocol w/ IV Cont (08.31.21 @ 08:53) >  CHEST: There is no evidence of central pulmonary embolism. The thoracic aorta is normal in caliber. There is coronary artery calcifications. The heart size within normal limits. There is a small to moderate pericardial effusion. There is no thoracic lymphadenopathy.   The thyroid Is present, incompletely evaluated on CT. The central tracheobronchial tree is patent. There is no consolidation, pneumothorax or pleural effusion. There is dependent atelectasis and patchy areas of subsegmental atelectasis. There is mild bilateral gynecomastia    HEPATOBILIARY: The left liver is lobulated incontour. There is a 1 cm right hepatic hypodensity on image 27 of series 6, incompletely characterized. Further evaluation with outpatient MRI with gadolinium is recommended. The gallbladder is distended.    SPLEEN: The spleen is enlarged measuring 16 cm.    PANCREAS: Unremarkable..    ADRENAL GLANDS: There is nodular thickening of the left adrenal gland. The right adrenal gland is unremarkable.    KIDNEYS: Subcentimeter hypodensities are too small to characterize. There is no hydronephrosis.    ABDOMINOPELVIC NODES: Unremarkable..    PELVIC ORGANS: There are prostate calcifications. There are small bilateral fat-containing inguinal hernias    PERITONEUM/MESENTERY/BOWEL: There is no free air or obstruction. The appendix is unremarkable.    BONES/SOFT TISSUES: Degenerative change. Old left rib fracture...    OTHER: Vascular calcifications are present..      ECHO PENDING  CT ABDOMEN W IV CONTRAST PENDING         CCU COURSE:  Patient admitted 8/31  GI and ENT eval performed, no active bleed identified. See above for esophageal mass  Type and screen ordered          ASSESSMENT & PLAN:   Patient is a 56 yom with a PMH of Obesity class III, A-Fib (s/p 2x cardioversion in 2008,2010, rythm controlled on Cardizem and Aspirin 81mg), YANIRA on C-pap at come (compliant), 4 pack year smoking history, 3x a week marijuana smoker (for decades) and hypothyroid (synthroid) presented to the ED on 8/31 after he woke up with "teaspoons of blood" on his pillow. When he opened his mouth dried blood was caked over his oropharynx (as per spouse) and more blood came out when he cleared his throat. In the ED endoscopy did not show an active bleed but did identify a 20mm esophageal mass that patient had been told about ~9 years ago was 'fatty tissue'. CTA CHEST did not show any active bleed or varices. GI ruled out Upper GI bleed. ENT assessed the patient and did not find any posterior nasal bleeding. Patient is followed by Dr Montejo out patient, dr was contacted about his presence, awaiting his call back. Will contact again if no response by end of day.     Problem List:    #Blood per mouth STOPPED likely secondary to hemoptesis vs hematemesis vs posterior nose bleed  -endoscopy did not identify an active bleed, varices or ulcerations  -CTA did not show extravisation  -continue to monitor H&H  -transfuse if Hemoglobin <7  -discussed symptoms of occult bleed with patient (metalic taste in mouth, nausea/diarrhea, black tarry stool/vomit, Bright red blood in stool/vomit, dizziness, palpitations etc) and told to keep a low threshold in contacting his PCP regarding this  -Consider CT abdomen w IV contrast if H&H drops but low likelyhood as was bright red blood   -Echo Ordered as per dr Montejo     #Esophageal nodule, 20mm  patient was told of a "small fatty" nodule ~9 years ago that he did not follow up on.  patient denies dysphagia or difficulty swallowing solids  unlikely to be source of bleed  f/u out patient    #mild Transaminitis  AST ALT in 50's, nodular liver, Patient is a non-drinker and obese, likely GALLEGOS  daily LFTs in patient and abdominal exam  f/u outpatient     #Mild Pericardial Effusion    #A-Fib  c/w rate control diltiazem  -f/u Carotid duplex    #Hypothyroid  c/w synthroid  #Nicotine dependence  #Marijuana dependence     #Misc  - DVT Prophylaxis: bilateral ICD   - GI Prophylaxis: protonix 40 mg   - Diet: DASH/TLC  - Activity: out of bed to chair   - IV Fluids: none   - Code Status: Full     Dispo:  Downgrade to floor      For Follow-Up:  Echo of heart for pericardial effusion  ENT recommendations for possible posterior nasal bleed.   LFTs MICU Transfer Note    Transfer from: ICU  Transfer to:  ( X ) Medicine    (  ) Telemetry    (  ) RCU    (  ) Palliative    (  ) Stroke Unit    (  ) _______________  Accepting physican:    MEDICATIONS:  STANDING MEDICATIONS  chlorhexidine 4% Liquid 1 Application(s) Topical <User Schedule>  diltiazem    milliGRAM(s) Oral daily    PRN MEDICATIONS      VITAL SIGNS: Last 24 Hours  T(C): 36.5 (01 Sep 2021 08:00), Max: 36.7 (31 Aug 2021 17:30)  T(F): 97.7 (01 Sep 2021 08:00), Max: 98.1 (01 Sep 2021 04:00)  HR: 90 (01 Sep 2021 11:00) (60 - 90)  BP: 145/68 (01 Sep 2021 11:00) (132/60 - 175/87)  BP(mean): 69 (01 Sep 2021 11:00) (69 - 128)  RR: 20 (01 Sep 2021 11:00) (15 - 36)  SpO2: 98% (01 Sep 2021 11:44) (87% - 100%)    LABS:                        10.5   8.87  )-----------( 174      ( 01 Sep 2021 04:51 )             34.1     09-01    137  |  101  |  13  ----------------------------<  156<H>  4.3   |  24  |  0.9    Ca    8.6      01 Sep 2021 04:51  Phos  2.9     09-01  Mg     2.0     09-01    TPro  6.8  /  Alb  4.1  /  TBili  1.0  /  DBili  x   /  AST  54<H>  /  ALT  50<H>  /  AlkPhos  58  09-01    PT/INR - ( 01 Sep 2021 04:51 )   PT: 14.00 sec;   INR: 1.22 ratio         PTT - ( 01 Sep 2021 04:51 )  PTT:35.6 sec        CARDIAC MARKERS ( 31 Aug 2021 08:15 )  x     / <0.01 ng/mL / x     / x     / x          RADIOLOGY:  < from: Xray Chest 1 View- PORTABLE-Routine (Xray Chest 1 View- PORTABLE-Routine in AM.) (09.01.21 @ 06:37) >  Impression:    No radiographic evidence of acute pulmonary process.      < from: CT Angio Chest PE Protocol w/ IV Cont (08.31.21 @ 08:53) >  CHEST: There is no evidence of central pulmonary embolism. The thoracic aorta is normal in caliber. There is coronary artery calcifications. The heart size within normal limits. There is a small to moderate pericardial effusion. There is no thoracic lymphadenopathy.   The thyroid Is present, incompletely evaluated on CT. The central tracheobronchial tree is patent. There is no consolidation, pneumothorax or pleural effusion. There is dependent atelectasis and patchy areas of subsegmental atelectasis. There is mild bilateral gynecomastia    HEPATOBILIARY: The left liver is lobulated incontour. There is a 1 cm right hepatic hypodensity on image 27 of series 6, incompletely characterized. Further evaluation with outpatient MRI with gadolinium is recommended. The gallbladder is distended.    SPLEEN: The spleen is enlarged measuring 16 cm.    PANCREAS: Unremarkable..    ADRENAL GLANDS: There is nodular thickening of the left adrenal gland. The right adrenal gland is unremarkable.    KIDNEYS: Subcentimeter hypodensities are too small to characterize. There is no hydronephrosis.    ABDOMINOPELVIC NODES: Unremarkable..    PELVIC ORGANS: There are prostate calcifications. There are small bilateral fat-containing inguinal hernias    PERITONEUM/MESENTERY/BOWEL: There is no free air or obstruction. The appendix is unremarkable.    BONES/SOFT TISSUES: Degenerative change. Old left rib fracture...    OTHER: Vascular calcifications are present..      ECHO PENDING  CT ABDOMEN W IV CONTRAST PENDING         CCU COURSE:  Patient admitted 8/31  GI and ENT eval performed, no active bleed identified. See above for esophageal mass  Type and screen ordered          ASSESSMENT & PLAN:   Patient is a 56 yom with a PMH of Obesity class III, A-Fib (s/p 2x cardioversion in 2008,2010, rythm controlled on Cardizem and Aspirin 81mg), YANIRA on C-pap at come (compliant), 4 pack year smoking history, 3x a week marijuana smoker (for decades) and hypothyroid (synthroid) presented to the ED on 8/31 after he woke up with "teaspoons of blood" on his pillow. When he opened his mouth dried blood was caked over his oropharynx (as per spouse) and more blood came out when he cleared his throat. In the ED endoscopy did not show an active bleed but did identify a 20mm esophageal mass that patient had been told about ~9 years ago was 'fatty tissue'. CTA CHEST did not show any active bleed or varices. GI ruled out Upper GI bleed. ENT assessed the patient and did not find any posterior nasal bleeding. Patient is followed by Dr Montejo out patient, dr was contacted about his presence, awaiting his call back. Will contact again if no response by end of day.     Problem List:    #Blood per mouth STOPPED likely secondary to hemoptesis vs hematemesis vs posterior nose bleed  -endoscopy did not identify an active bleed, varices or ulcerations  -CTA did not show extravisation  -continue to monitor H&H  -transfuse if Hemoglobin <7  -discussed symptoms of occult bleed with patient (metalic taste in mouth, nausea/diarrhea, black tarry stool/vomit, Bright red blood in stool/vomit, dizziness, palpitations etc) and told to keep a low threshold in contacting his PCP regarding this  -Consider CT abdomen w IV contrast if H&H drops but low likelyhood as was bright red blood   -Echo Ordered as per dr Montejo     #Esophageal nodule, 20mm  patient was told of a "small fatty" nodule ~9 years ago that he did not follow up on.  patient denies dysphagia or difficulty swallowing solids  unlikely to be source of bleed  f/u out patient    #mild Transaminitis  AST ALT in 50's, nodular liver, Patient is a non-drinker and obese, likely GALLEGOS  daily LFTs in patient and abdominal exam  f/u outpatient     #Mild Pericardial Effusion    #A-Fib  c/w rate control diltiazem  -f/u Carotid duplex    #Hypothyroid  c/w synthroid  #Nicotine dependence  #Marijuana dependence     #Misc  - DVT Prophylaxis: bilateral ICD   - GI Prophylaxis: protonix 40 mg   - Diet: DASH/TLC  - Activity: out of bed to chair   - IV Fluids: none   - Code Status: Full     Dispo:  Downgrade to floor      For Follow-Up:  Echo of heart for pericardial effusion  ENT recommendations for possible posterior nasal bleed.   LFTs  Patient has EGD scheduled for tomorrow, please keep him NPO after midnight (ordered), and follow-up labs at 20:00 (please correct overnight)

## 2021-09-01 NOTE — CONSULT NOTE ADULT - ASSESSMENT
presents with hemoptysis        CT scan   small to moderate   pericardial   effusion    plan  echocardiogram

## 2021-09-01 NOTE — PROGRESS NOTE ADULT - ASSESSMENT
56 year old male with PMH of Afib not on anticoagulation, Sleep apnea on CPAP @ night, thalassemia trait , PSH  of esophogeal nodule removed 9 years ago presenting with dyspnea and hemoptysis/hematemesis. Pt reports awoke with bright red blood on his pillow and face. Reports stood up and experienced more episodes of bright red blood with more than a table spoon, unable to decipher to if vomiting up blood or coughing up blood. Reports began to experience epigastric pain upon ED arrival.     #)?Hematemesis/?hemoptysis -Resolved   s/p EGD ruled out GI bleed (No varices, no active bleeding noted)  Hemodynamically stable   Hb stable  Only on aspirin   CTA negative for bleed bust showed lobulated liver contour, splenomegaly consistent with cirrhosis  ENt-No evidence of intranasal or laryngeal bleed    Rec:  c/w regular diet from Gi stand point  Maintain active type and screen.  Trend Hb once a day    #)Mild transaminits hepatocellular likely due to NAFLD vs r/o CLD-Elevated since last 1-2 years  #)?Cirrhosis (radiological evidence CTA showed lobulated liver contour, splenomegaly consistent with cirrhosis), no biochemical evidence     Recs:   Trend LFT  avoid hepatotoxic medications  US abdomen   Please perform a CLD work up which includes HAV IgM/IgG, HBsAg, HBsAb, HBcAb IgM/IgG, HCV Ab, Anti HEV, Serum Ferritin, Transferrin Saturation, Ceruloplasmin level, CATHERINE, SMA, immunoglobulins panel, AMA, Anti LK microsomal Ab, anti-soluble liver Ag.  56 year old male with PMH of Afib not on anticoagulation, Sleep apnea on CPAP @ night, thalassemia trait , PSH  of esophogeal nodule removed 9 years ago presenting with dyspnea and hemoptysis/hematemesis. Pt reports awoke with bright red blood on his pillow and face. Reports stood up and experienced more episodes of bright red blood with more than a table spoon, unable to decipher to if vomiting up blood or coughing up blood. Reports began to experience epigastric pain upon ED arrival.     #)?Hematemesis/?hemoptysis -Resolved   s/p EGD ruled out GI bleed (No varices, no active bleeding noted), had esophageal nodule  Hemodynamically stable   Hb stable  Only on aspirin   CTA negative for bleed bust showed lobulated liver contour, splenomegaly consistent with cirrhosis  ENt-No evidence of intranasal or laryngeal bleed    Rec:  c/w regular diet from Gi stand point  Maintain active type and screen.  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

## 2021-09-01 NOTE — PROGRESS NOTE ADULT - ASSESSMENT
IMPRESSION:  Posterior nasal bleeding ?  SP EGD.  Intubated for EGD.    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  Small to moderate pericardial effusion  Hepatic nodule  HO YANIRA on CPAP     PLAN:    CNS: avoid depressants.      HEENT: Oral care. ENT eval    PULMONARY:  HOB @ 45 degrees.  CPAP during sleep    CARDIOVASCULAR: FU Echo.  Cardio c/s.  Avoid volume overload     GI: GI prophylaxis. Feeding.  FU wtih GI Dr. Stratton     RENAL:  Follow up lytes.  Correct as needed    INFECTIOUS DISEASE: Follow up cultures    HEMATOLOGICAL:  DVT prophylaxis seq. FU CBC and Coags     ENDOCRINE:  Follow up FS.      MUSCULOSKELETAL:  OOB to chair     Floor

## 2021-09-02 ENCOUNTER — TRANSCRIPTION ENCOUNTER (OUTPATIENT)
Age: 56
End: 2021-09-02

## 2021-09-02 ENCOUNTER — RESULT REVIEW (OUTPATIENT)
Age: 56
End: 2021-09-02

## 2021-09-02 LAB
ALBUMIN SERPL ELPH-MCNC: 4.3 G/DL — SIGNIFICANT CHANGE UP (ref 3.5–5.2)
ALP SERPL-CCNC: 62 U/L — SIGNIFICANT CHANGE UP (ref 30–115)
ALT FLD-CCNC: 44 U/L — HIGH (ref 0–41)
ANION GAP SERPL CALC-SCNC: 12 MMOL/L — SIGNIFICANT CHANGE UP (ref 7–14)
AST SERPL-CCNC: 42 U/L — HIGH (ref 0–41)
BASOPHILS # BLD AUTO: 0.03 K/UL — SIGNIFICANT CHANGE UP (ref 0–0.2)
BASOPHILS NFR BLD AUTO: 0.3 % — SIGNIFICANT CHANGE UP (ref 0–1)
BILIRUB SERPL-MCNC: 0.9 MG/DL — SIGNIFICANT CHANGE UP (ref 0.2–1.2)
BILIRUB SERPL-MCNC: 1 MG/DL — SIGNIFICANT CHANGE UP (ref 0.2–1.2)
BUN SERPL-MCNC: 14 MG/DL — SIGNIFICANT CHANGE UP (ref 10–20)
CALCIUM SERPL-MCNC: 8.6 MG/DL — SIGNIFICANT CHANGE UP (ref 8.5–10.1)
CHLORIDE SERPL-SCNC: 102 MMOL/L — SIGNIFICANT CHANGE UP (ref 98–110)
CO2 SERPL-SCNC: 26 MMOL/L — SIGNIFICANT CHANGE UP (ref 17–32)
CREAT SERPL-MCNC: 0.8 MG/DL — SIGNIFICANT CHANGE UP (ref 0.7–1.5)
CREAT SERPL-MCNC: 0.8 MG/DL — SIGNIFICANT CHANGE UP (ref 0.7–1.5)
EOSINOPHIL # BLD AUTO: 0.02 K/UL — SIGNIFICANT CHANGE UP (ref 0–0.7)
EOSINOPHIL NFR BLD AUTO: 0.2 % — SIGNIFICANT CHANGE UP (ref 0–8)
GLUCOSE SERPL-MCNC: 150 MG/DL — HIGH (ref 70–99)
HBV E AB SER-ACNC: SIGNIFICANT CHANGE UP
HCT VFR BLD CALC: 35.9 % — LOW (ref 42–52)
HGB BLD-MCNC: 10.9 G/DL — LOW (ref 14–18)
IGA FLD-MCNC: 338 MG/DL — SIGNIFICANT CHANGE UP (ref 84–499)
IGG FLD-MCNC: 1163 MG/DL — SIGNIFICANT CHANGE UP (ref 610–1660)
IGM SERPL-MCNC: 175 MG/DL — SIGNIFICANT CHANGE UP (ref 35–242)
IMM GRANULOCYTES NFR BLD AUTO: 0.9 % — HIGH (ref 0.1–0.3)
INR BLD: 1.2 RATIO — SIGNIFICANT CHANGE UP (ref 0.65–1.3)
KAPPA LC SER QL IFE: 1.41 MG/DL — SIGNIFICANT CHANGE UP (ref 0.33–1.94)
KAPPA/LAMBDA FREE LIGHT CHAIN RATIO, SERUM: 0.78 RATIO — SIGNIFICANT CHANGE UP (ref 0.26–1.65)
LAMBDA LC SER QL IFE: 1.8 MG/DL — SIGNIFICANT CHANGE UP (ref 0.57–2.63)
LYMPHOCYTES # BLD AUTO: 1.21 K/UL — SIGNIFICANT CHANGE UP (ref 1.2–3.4)
LYMPHOCYTES # BLD AUTO: 13.9 % — LOW (ref 20.5–51.1)
MAGNESIUM SERPL-MCNC: 2 MG/DL — SIGNIFICANT CHANGE UP (ref 1.8–2.4)
MCHC RBC-ENTMCNC: 20.1 PG — LOW (ref 27–31)
MCHC RBC-ENTMCNC: 30.4 G/DL — LOW (ref 32–37)
MCV RBC AUTO: 66.4 FL — LOW (ref 80–94)
MELD SCORE WITH DIALYSIS: 22 POINTS — SIGNIFICANT CHANGE UP
MELD SCORE WITHOUT DIALYSIS: 8 POINTS — SIGNIFICANT CHANGE UP
MITOCHONDRIA AB SER-ACNC: SIGNIFICANT CHANGE UP
MONOCYTES # BLD AUTO: 0.21 K/UL — SIGNIFICANT CHANGE UP (ref 0.1–0.6)
MONOCYTES NFR BLD AUTO: 2.4 % — SIGNIFICANT CHANGE UP (ref 1.7–9.3)
NEUTROPHILS # BLD AUTO: 7.17 K/UL — HIGH (ref 1.4–6.5)
NEUTROPHILS NFR BLD AUTO: 82.3 % — HIGH (ref 42.2–75.2)
NRBC # BLD: 0 /100 WBCS — SIGNIFICANT CHANGE UP (ref 0–0)
PLATELET # BLD AUTO: 174 K/UL — SIGNIFICANT CHANGE UP (ref 130–400)
POTASSIUM SERPL-MCNC: 4.2 MMOL/L — SIGNIFICANT CHANGE UP (ref 3.5–5)
POTASSIUM SERPL-SCNC: 4.2 MMOL/L — SIGNIFICANT CHANGE UP (ref 3.5–5)
PROT SERPL-MCNC: 7.1 G/DL — SIGNIFICANT CHANGE UP (ref 6–8)
PROTHROM AB SERPL-ACNC: 13.8 SEC — HIGH (ref 9.95–12.87)
RBC # BLD: 5.41 M/UL — SIGNIFICANT CHANGE UP (ref 4.7–6.1)
RBC # FLD: 16.9 % — HIGH (ref 11.5–14.5)
SMOOTH MUSCLE AB SER-ACNC: ABNORMAL
SODIUM SERPL-SCNC: 140 MMOL/L — SIGNIFICANT CHANGE UP (ref 135–146)
SODIUM SERPL-SCNC: 143 MMOL/L — SIGNIFICANT CHANGE UP (ref 135–146)
WBC # BLD: 8.72 K/UL — SIGNIFICANT CHANGE UP (ref 4.8–10.8)
WBC # FLD AUTO: 8.72 K/UL — SIGNIFICANT CHANGE UP (ref 4.8–10.8)

## 2021-09-02 PROCEDURE — 43239 EGD BIOPSY SINGLE/MULTIPLE: CPT | Mod: XU

## 2021-09-02 PROCEDURE — 88305 TISSUE EXAM BY PATHOLOGIST: CPT | Mod: 26

## 2021-09-02 PROCEDURE — 88312 SPECIAL STAINS GROUP 1: CPT | Mod: 26

## 2021-09-02 PROCEDURE — 99232 SBSQ HOSP IP/OBS MODERATE 35: CPT

## 2021-09-02 PROCEDURE — 43254 EGD ENDO MUCOSAL RESECTION: CPT

## 2021-09-02 RX ORDER — LEVOTHYROXINE SODIUM 125 MCG
50 TABLET ORAL DAILY
Refills: 0 | Status: DISCONTINUED | OUTPATIENT
Start: 2021-09-02 | End: 2021-09-03

## 2021-09-02 RX ORDER — SUCRALFATE 1 G
1 TABLET ORAL EVERY 6 HOURS
Refills: 0 | Status: DISCONTINUED | OUTPATIENT
Start: 2021-09-02 | End: 2021-09-03

## 2021-09-02 RX ORDER — SODIUM CHLORIDE 0.65 %
2 AEROSOL, SPRAY (ML) NASAL EVERY 6 HOURS
Refills: 0 | Status: DISCONTINUED | OUTPATIENT
Start: 2021-09-02 | End: 2021-09-03

## 2021-09-02 RX ORDER — PANTOPRAZOLE SODIUM 20 MG/1
40 TABLET, DELAYED RELEASE ORAL EVERY 12 HOURS
Refills: 0 | Status: DISCONTINUED | OUTPATIENT
Start: 2021-09-02 | End: 2021-09-03

## 2021-09-02 RX ORDER — SODIUM CHLORIDE 9 MG/ML
1000 INJECTION, SOLUTION INTRAVENOUS
Refills: 0 | Status: DISCONTINUED | OUTPATIENT
Start: 2021-09-02 | End: 2021-09-03

## 2021-09-02 RX ADMIN — SODIUM CHLORIDE 60 MILLILITER(S): 9 INJECTION, SOLUTION INTRAVENOUS at 17:49

## 2021-09-02 RX ADMIN — Medication 2 SPRAY(S): at 18:47

## 2021-09-02 RX ADMIN — Medication 1 GRAM(S): at 17:45

## 2021-09-02 RX ADMIN — Medication 10 MILLIGRAM(S): at 22:48

## 2021-09-02 RX ADMIN — Medication 120 MILLIGRAM(S): at 06:26

## 2021-09-02 RX ADMIN — PANTOPRAZOLE SODIUM 40 MILLIGRAM(S): 20 TABLET, DELAYED RELEASE ORAL at 17:46

## 2021-09-02 RX ADMIN — PANTOPRAZOLE SODIUM 40 MILLIGRAM(S): 20 TABLET, DELAYED RELEASE ORAL at 08:37

## 2021-09-02 RX ADMIN — CHLORHEXIDINE GLUCONATE 1 APPLICATION(S): 213 SOLUTION TOPICAL at 07:30

## 2021-09-02 NOTE — CHART NOTE - NSCHARTNOTEFT_GEN_A_CORE
Patient is S/P EGD / EUS with band Endoscopic mucosal resection of esophageal nodule .   EGD showed 1 cm nodule at 20 cm from incisors . EUS confirmed that the nodule is mucosal , then the nodule was lifted with ORISE, adequate cushion was obtained , the nodule removed using band EMR technique. polypectomy bed examined , and no complication seen .     REC:   await pathology results   clear Liquid diet  Carafate 1 gr QID  Protonix 40 mg po bid   will follow

## 2021-09-02 NOTE — CHART NOTE - NSCHARTNOTEFT_GEN_A_CORE
PACU ANESTHESIA ADMISSION NOTE      Procedure:   Post op diagnosis:      ____  Intubated  TV:______       Rate: ______      FiO2: ______    _x___  Patent Airway    _x___  Full return of protective reflexes    _x___  Full recovery from anesthesia / back to baseline status    Vitals:  T(C): 36.8 (09-02-21 @ 12:38), Max: 36.8 (09-01-21 @ 16:00)  HR: 68 (09-02-21 @ 14:37) (66 - 78)  BP: 143/77 (09-02-21 @ 14:37) (143/77 - 156/62)  RR: 18 (09-02-21 @ 14:37) (18 - 20)  SpO2: 99% (09-02-21 @ 14:37) (98% - 99%)    Mental Status:  _x___ Awake   _____ Alert   _____ Drowsy   _____ Sedated    Nausea/Vomiting:  _x___  NO       ______Yes,   See Post - Op Orders         Pain Scale (0-10):  __0___    Treatment: _x___ None    ____ See Post - Op/PCA Orders    Post - Operative Fluids:   __x__ Oral   ____ See Post - Op Orders    Plan: Discharge:   ___Home       ___x__Floor     _____Critical Care    _____  Other:_________________    Comments:  No anesthesia issues or complications noted.  Discharge when criteria met.

## 2021-09-02 NOTE — MEDICAL STUDENT PROGRESS NOTE(EDUCATION) - SUBJECTIVE AND OBJECTIVE BOX
LENGTH OF HOSPITAL STAY: 2d    CHIEF COMPLAINT:   Patient is a 56y old  Male who presents with a chief complaint of Hemoptysis (01 Sep 2021 11:45)    OVERNIGH EVENTS:   No acute overnight events. Patient uses home CPAP at night.     FOLLOW UP:   EGD today  Monitor Hgb and LFT      HOSPITAL COURSE:     HISTORY OF PRESENTING ILLNESS:    HPI:  56 y M w/ PMH of PAF not on AC, Sleep apnea on CPAP, hemorrhoids found during most recent colonoscopy,  hypothyroidism, and PSHx of esophageal nodule removal 9 years ago presented to ED with dyspnea and hemoptysis. States that when he woke up this morning, noticed blood and his pillow. When he got out bed of he began to spit up bright red blood, about a tablespoon's worth. Denies similar symptoms in the past. Denies family Hx of liver disease, IV drug use or alcohol use.  Currently denies abdominal pain, nausea, and vomiting.     In ED patient was slightly hypertensive, all other vitals were wnl. There was no evidence of PE in CT angio chest. CT Abdo/pelvis findings were consistent with cirrhosis with lobulated liver contour, enlarged spleen, and gynecomastia. Octreotide injection, and 1G Rocephin  were given.  Patient started on Protonix gtt.     (31 Aug 2021 13:24)      PAST MEDICAL & SURGICAL HISTORY  PAST MEDICAL & SURGICAL HISTORY:  Sleep apnea with use of continuous positive airway pressure (CPAP)    Atrial fibrillation, unspecified type    Sciatica    Hypothyroidism      SOCIAL HISTORY:    ALLERGIES:  No Known Allergies    MEDICATIONS:  STANDING MEDICATIONS  chlorhexidine 4% Liquid 1 Application(s) Topical <User Schedule>  diltiazem    milliGRAM(s) Oral daily  pantoprazole    Tablet 40 milliGRAM(s) Oral before breakfast    PRN MEDICATIONS  diazepam    Tablet 10 milliGRAM(s) Oral at bedtime PRN    VITALS:   T(F): 98.3  HR: 78  BP: 156/62  RR: 20  SpO2: 99%    LABS:                        10.4   12.56 )-----------( 186      ( 01 Sep 2021 20:00 )             33.4     09-02    143  |  x   |  x   ----------------------------<  x   x    |  x   |  0.8    Ca    8.8      01 Sep 2021 20:00  Phos  3.6     09-01  Mg     2.0     09-01    TPro  x   /  Alb  x   /  TBili  0.9  /  DBili  x   /  AST  x   /  ALT  x   /  AlkPhos  x   09-02    PT/INR - ( 02 Sep 2021 08:03 )   PT: 13.80 sec;   INR: 1.20 ratio      PTT - ( 01 Sep 2021 04:51 )  PTT:35.6 sec      RADIOLOGY:            PHYSICAL EXAM:  Gen: NAD, sitting comfortably in bed  Eyes: PERRLA  HEENT: Normocephalic, atraumatic. External ears normal, no rhinorrhea, moist mucous membranes.   CV: RRR, no M/R/G  Resp: CTAB, non-labored, speaking without difficulty on room air  Abd: soft, non tender, non rigid, no guarding or rebound tenderness  Skin: dry, no rashes or jaundice   Neuro: AOx3, speech is fluent and appropriate     LENGTH OF HOSPITAL STAY: 2d    CHIEF COMPLAINT:   Patient is a 56y old  Male who presents with a chief complaint of Hemoptysis (01 Sep 2021 11:45)    OVERNIGH EVENTS:   No acute overnight events. Patient uses home CPAP at night.     FOLLOW UP:   EGD today  Monitor Hgb and LFT    HOSPITAL COURSE:     56 y M w/ PMH of PAF not on AC, Sleep apnea on CPAP, hemorrhoids found during most recent colonoscopy,  hypothyroidism, and PSHx of esophageal nodule removal 9 years ago presented to ED with dyspnea and hemoptysis. Endoscopy did not identify an active bleed, varices or ulcerations but was significant for Nodule in the upper third of the esophagus 20 cm from incisor. Patient is pending removal of the nodule. No longer reporting hemoptysis or abdominal pain. ENT did not find intranasal or laryngeal source of bleed.    HISTORY OF PRESENTING ILLNESS:    HPI:  56 y M w/ PMH of PAF not on AC, Sleep apnea on CPAP, hemorrhoids found during most recent colonoscopy,  hypothyroidism, and PSHx of esophageal nodule removal 9 years ago presented to ED with dyspnea and hemoptysis. States that when he woke up this morning, noticed blood and his pillow. When he got out bed of he began to spit up bright red blood, about a tablespoon's worth. Denies similar symptoms in the past. Denies family Hx of liver disease, IV drug use or alcohol use.  Currently denies abdominal pain, nausea, and vomiting.     In ED patient was slightly hypertensive, all other vitals were wnl. There was no evidence of PE in CT angio chest. CT Abdo/pelvis findings were consistent with cirrhosis with lobulated liver contour, enlarged spleen, and gynecomastia. Octreotide injection, and 1G Rocephin  were given.  Patient started on Protonix gtt.     (31 Aug 2021 13:24)      PAST MEDICAL & SURGICAL HISTORY  PAST MEDICAL & SURGICAL HISTORY:  Sleep apnea with use of continuous positive airway pressure (CPAP)    Atrial fibrillation, unspecified type    Sciatica    Hypothyroidism      SOCIAL HISTORY:    ALLERGIES:  No Known Allergies    MEDICATIONS:  STANDING MEDICATIONS  chlorhexidine 4% Liquid 1 Application(s) Topical <User Schedule>  diltiazem    milliGRAM(s) Oral daily  pantoprazole    Tablet 40 milliGRAM(s) Oral before breakfast    PRN MEDICATIONS  diazepam    Tablet 10 milliGRAM(s) Oral at bedtime PRN    VITALS:   T(F): 98.3  HR: 78  BP: 156/62  RR: 20  SpO2: 99%    LABS:                        10.4   12.56 )-----------( 186      ( 01 Sep 2021 20:00 )             33.4     09-02    143  |  x   |  x   ----------------------------<  x   x    |  x   |  0.8    Ca    8.8      01 Sep 2021 20:00  Phos  3.6     09-01  Mg     2.0     09-01    TPro  x   /  Alb  x   /  TBili  0.9  /  DBili  x   /  AST  x   /  ALT  x   /  AlkPhos  x   09-02    PT/INR - ( 02 Sep 2021 08:03 )   PT: 13.80 sec;   INR: 1.20 ratio      PTT - ( 01 Sep 2021 04:51 )  PTT:35.6 sec      RADIOLOGY:   Xray Chest 1 View- PORTABLE-Routine (Xray Chest 1 View- PORTABLE-Routine in AM.) (09.01.21 @ 06:37) >  Impression:No radiographic evidence of acute pulmonary process.      CT Angio Chest PE Protocol w/ IV Cont (08.31.21 @ 08:53) >  CHEST: There is no evidence of central pulmonary embolism. The thoracic aorta is normal in caliber. There is coronary artery calcifications. The heart size within normal limits. There is a small to moderate pericardial effusion. There is no thoracic lymphadenopathy.   The thyroid Is present, incompletely evaluated on CT. The central tracheobronchial tree is patent. There is no consolidation, pneumothorax or pleural effusion. There is dependent atelectasis and patchy areas of subsegmental atelectasis. There is mild bilateral gynecomastia  HEPATOBILIARY: The left liver is lobulated isocontour There is a 1 cm right hepatic hypodensity on image 27 of series 6, incompletely characterized. Further evaluation with outpatient MRI with gadolinium is recommended. The gallbladder is distended.  SPLEEN: The spleen is enlarged measuring 16 cm.  PANCREAS: Unremarkable..  ADRENAL GLANDS: There is nodular thickening of the left adrenal gland. The right adrenal gland is unremarkable.  KIDNEYS: Subcentimeter hypodensity are too small to characterize. There is no hydronephrosis.  ABDOMINOPELVIC NODES: Unremarkable..  PELVIC ORGANS: There are prostate calcifications. There are small bilateral fat-containing inguinal hernias  PERITONEUM/MESENTERY/BOWEL: There is no free air or obstruction. The appendix is unremarkable.  BONES/SOFT TISSUES: Degenerative change. Old left rib fracture...  OTHER: Vascular calcifications are present..          PHYSICAL EXAM:  Gen: NAD, sitting comfortably in bed  Eyes: PERRLA  HEENT: Normocephalic, atraumatic. No rhinorrhea, moist mucous membranes. No blood in oropharynx   CV: RRR, no M/R/G  Resp: CTAB, non-labored, speaking without difficulty on room air  Abd: soft, non tender, non rigid, no guarding or rebound tenderness  Skin: dry, no rashes or jaundice   Neuro: AOx3, speech is fluent and appropriate     LENGTH OF HOSPITAL STAY: 2d    CHIEF COMPLAINT:   Patient is a 56y old  Male who presents with a chief complaint of Hemoptysis (01 Sep 2021 11:45)    OVERNIGH EVENTS:   No acute overnight events. Patient uses home CPAP at night.     FOLLOW UP:   EGD today  Monitor Hgb and LFT    HOSPITAL COURSE:     56 y M w/ PMH of PAF not on AC, Sleep apnea on CPAP, hemorrhoids found during most recent colonoscopy,  hypothyroidism, and PSHx of esophageal nodule removal 9 years ago presented to ED with dyspnea and hemoptysis. Endoscopy did not identify an active bleed, varices or ulcerations but was significant for Nodule in the upper third of the esophagus 20 cm from incisor. Patient is pending removal of the nodule. No longer reporting hemoptysis or abdominal pain. ENT did not find intranasal or laryngeal source of bleed.    HISTORY OF PRESENTING ILLNESS:    HPI:  56 y M w/ PMH of PAF not on AC, Sleep apnea on CPAP, hemorrhoids found during most recent colonoscopy,  hypothyroidism, and PSHx of esophageal nodule removal 9 years ago presented to ED with dyspnea and hemoptysis. States that when he woke up this morning, noticed blood and his pillow. When he got out bed of he began to spit up bright red blood, about a tablespoon's worth. Denies similar symptoms in the past. Denies family Hx of liver disease, IV drug use or alcohol use.  Currently denies abdominal pain, nausea, and vomiting.     In ED patient was slightly hypertensive, all other vitals were wnl. There was no evidence of PE in CT angio chest. CT Abdo/pelvis findings were consistent with cirrhosis with lobulated liver contour, enlarged spleen, and gynecomastia. Octreotide injection, and 1G Rocephin  were given.  Patient started on Protonix gtt.     (31 Aug 2021 13:24)      PAST MEDICAL & SURGICAL HISTORY  PAST MEDICAL & SURGICAL HISTORY:  Sleep apnea with use of continuous positive airway pressure (CPAP)    Atrial fibrillation, unspecified type    Sciatica    Hypothyroidism      SOCIAL HISTORY:    ALLERGIES:  No Known Allergies    MEDICATIONS:  STANDING MEDICATIONS  chlorhexidine 4% Liquid 1 Application(s) Topical <User Schedule>  diltiazem    milliGRAM(s) Oral daily  pantoprazole    Tablet 40 milliGRAM(s) Oral before breakfast    PRN MEDICATIONS  diazepam    Tablet 10 milliGRAM(s) Oral at bedtime PRN    VITALS:   T(F): 98.3  HR: 78  BP: 156/62  RR: 20  SpO2: 99%    LABS:                        10.4   12.56 )-----------( 186      ( 01 Sep 2021 20:00 )             33.4     09-02    143  |  x   |  x   ----------------------------<  x   x    |  x   |  0.8    Ca    8.8      01 Sep 2021 20:00  Phos  3.6     09-01  Mg     2.0     09-01    TPro  x   /  Alb  x   /  TBili  0.9  /  DBili  x   /  AST  x   /  ALT  x   /  AlkPhos  x   09-02    PT/INR - ( 02 Sep 2021 08:03 )   PT: 13.80 sec;   INR: 1.20 ratio      PTT - ( 01 Sep 2021 04:51 )  PTT:35.6 sec      RADIOLOGY:   Xray Chest 1 View- PORTABLE-Routine (Xray Chest 1 View- PORTABLE-Routine in AM.) (09.01.21 @ 06:37) >  Impression:No radiographic evidence of acute pulmonary process.      CT Angio Chest PE Protocol w/ IV Cont (08.31.21 @ 08:53) >  CHEST: There is no evidence of central pulmonary embolism. The thoracic aorta is normal in caliber. There is coronary artery calcifications. The heart size within normal limits. There is a small to moderate pericardial effusion. There is no thoracic lymphadenopathy.   The thyroid Is present, incompletely evaluated on CT. The central tracheobronchial tree is patent. There is no consolidation, pneumothorax or pleural effusion. There is dependent atelectasis and patchy areas of subsegmental atelectasis. There is mild bilateral gynecomastia  HEPATOBILIARY: The left liver is lobulated isocontour There is a 1 cm right hepatic hypodensity on image 27 of series 6, incompletely characterized. Further evaluation with outpatient MRI with gadolinium is recommended. The gallbladder is distended.  SPLEEN: The spleen is enlarged measuring 16 cm.  PANCREAS: Unremarkable..  ADRENAL GLANDS: There is nodular thickening of the left adrenal gland. The right adrenal gland is unremarkable.  KIDNEYS: Subcentimeter hypodensity are too small to characterize. There is no hydronephrosis.  ABDOMINOPELVIC NODES: Unremarkable..  PELVIC ORGANS: There are prostate calcifications. There are small bilateral fat-containing inguinal hernias  PERITONEUM/MESENTERY/BOWEL: There is no free air or obstruction. The appendix is unremarkable.  BONES/SOFT TISSUES: Degenerative change. Old left rib fracture...  OTHER: Vascular calcifications are present..          PHYSICAL EXAM:  Gen: NAD, sitting comfortably in bed  CV: RRR, no murmurs, rubs or gallops  Resp: CTAB, non-labored, speaking without difficulty on room air  Abd: soft, non tender, non rigid, no guarding or rebound tenderness  Skin: dry, no rashes or jaundice   Neuro: AOx3, speech is fluent and appropriate

## 2021-09-02 NOTE — MEDICAL STUDENT PROGRESS NOTE(EDUCATION) - NS MD HP STUD ASPLAN PLAN FT
#Blood per mouth   - Endoscopy did not identify an active bleed, varices or ulcerations  - ENT did not find intranasal or laryngeal source of bleed identified.  - CTA did not show extravasation  - Monitor H&H  - Transfuse if Hemoglobin <7    #Esophageal nodule, 20mm  - Hx of esophageal polyp removal 9yrs ago  - Patient denies dysphagia or difficulty swallowing solids  - Unlikely to be source of bleed  - Pending EGD with EMR of the nodule    #Pericardial Effusion   - CT: Small to moderate pericardial effusion  - Cardio recommended Echo  - Echo: EF of 59 %.Grade I diastolic dysfunction. Trivial Pericardial effusion     #Mild Transaminitis  - LFT's in the 50's  - No Alcohol use or Family Hx of Liver disease  - Follow up LFT's     #A-Fib  - S/p Cardioversion in 2008,2010  - On Diltiazem 120mg   - F/u Carotid duplex    #Hypothyroid  - Synthroid 50     #Nicotine use  - Smokes 3-4 cigarets per day  - Has about 20 pack year smoking history     #Marijuana use  - Smokes about 1-2 times per week     #Misc  - DVT Prophylaxis: bilateral ICD   - GI Prophylaxis: protonix 40 mg   - Diet: DASH/TLC  - Activity: Activity as tolerated  - Code Status: Full     Dispo:  Patient from home  Pending EGD #Blood per mouth   - Endoscopy did not identify an active bleed, varices or ulcerations. Nodule was noted in esophagus.   - ENT did not find intranasal or laryngeal source of bleed identified.  - CTA did not show extravasation  - Monitor H&H  - Transfuse if Hemoglobin <7    #Esophageal nodule on EGD  - Hx of esophageal polyp removal 9yrs ago  - EGD findings: Nodule in the upper third of the esophagus 20 cm from incisor.  - Patient denies dysphagia or difficulty swallowing solids  - Pending EGD with EMR of the nodule    #Pericardial Effusion   - CT: Small to moderate pericardial effusion  - Cardio recommended Echo  - Echo: EF of 59 %.Grade I diastolic dysfunction. Trivial Pericardial effusion     #Mild Transaminitis  - LFT's in the 50's  - No Alcohol use or Family Hx of Liver disease  - Follow up LFT's     #A-Fib  - S/p Cardioversion in 2008,2010  - On Diltiazem 120mg   - F/u Carotid duplex    #Hypothyroid  - Synthroid 50     #Nicotine use  - Smokes 3-4 cigarets per day  - Has about 20 pack year smoking history     #Marijuana use  - Smokes about 1-2 times per week     #Misc  - DVT Prophylaxis: bilateral ICD   - GI Prophylaxis: protonix 40 mg   - Diet: DASH/TLC  - Activity: Activity as tolerated  - Code Status: Full     Dispo:  Patient from home  Pending EGD #Blood per mouth   - Endoscopy did not identify an active bleed, varices or ulcerations. Nodule was noted in esophagus.   - ENT did not find intranasal or laryngeal source of bleed identified.  - CTA did not show extravasation  - Monitor H&H  - Transfuse if Hemoglobin <7  -Hb stable, 10.4 today    #Esophageal nodule on EGD  - Hx of esophageal polyp removal 9yrs ago  - EGD findings: Nodule in the upper third of the esophagus 20 cm from incisor.  - Patient denies dysphagia or difficulty swallowing solids  - Pending EGD with EMR of the nodule    #Pericardial Effusion   - CT: Small to moderate pericardial effusion  - Cardio recommended Echo  - Echo: EF of 59 %.Grade I diastolic dysfunction. Trivial Pericardial effusion     #Mild Transaminitis  - LFT's in the 50's  - No Alcohol use or Family Hx of Liver disease  - Follow up LFT's     #A-Fib  - S/p Cardioversion in 2008,2010  - On Diltiazem 120mg   - F/u Carotid duplex    #Hypothyroid  - Synthroid 50     #Nicotine use  - Smokes 3-4 cigarettes per day  - Has about 20 pack year smoking history     #Marijuana use  - Smokes about 1-2 times per week     #Misc  - DVT Prophylaxis: bilateral ICD   - GI Prophylaxis: protonix 40 mg   - Diet: DASH/TLC  - Activity: Activity as tolerated  - Code Status: Full     Dispo:  Patient from home  Pending EGD

## 2021-09-02 NOTE — PROGRESS NOTE ADULT - ASSESSMENT
Patient is a 57 yo man with a PMH of Obesity class III, A-Fib (s/p 2x cardioversion in 2008,2010, rhythm controlled on Cardizem and Aspirin 81mg), YANIRA on C-pap at come (compliant), + smoking history, 3x a week marijuana smoker (for decades) and hypothyroid (synthroid) presented to the ED on 8/31 after he woke up with "teaspoons of blood" on his pillow.   In the ED endoscopy did not show an active bleed but did identify a 20mm esophageal mass that patient had been told about ~9 years ago was 'fatty tissue'.   CTA CHEST did not show any active bleed or varices.  GI ruled out Upper GI bleed.  ENT assessed the patient and did not find any posterior nasal bleeding.    # Blood per mouth STOPPED likely 2/2 epistaxis 2/2 CPAP machine  endoscopy did not identify an active bleed, varices or ulcerations  transfuse if Hemoglobin <7  ocean NS 2 spr c nos q6, heidi HS    # Esophageal polyp, 20mm  was removed 9 yrs ago at [facility]  s/p EMR today - removed successfully (I spoke w/ Dr Raymond  patient denies dysphagia   IVFs: 1/2 NS 60/hr  diet: clrs tonight, likely adv noelle  carafate 1gm po q6  PPI po q12  outpt GI f/u    # minimal Transaminitis - likely NAFLD  lose wt    # trivial Pericardial Effusion  no further w/u    # A-Fib - CHADSVASc = 0  c/w rate control diltiazem  Carotid duplex: mild stenosis  will eventually need asa 81mg po q24 again - after f/u w/ GI    # Hypothyroid  c/w synthroid    # Nicotine dependence; Marijuana dependence   told pt to quit    # insomnia  valium 10mg po qhs prn - pt request  pt will not get rx for this upon d/c    # DVT Prophylaxis: bilateral SCDs    # GI Prophylaxis: protonix 40 mg     # Activity: Independent    # Code Status: Full     Dispo: anticipate d/c home tomorrow; plan above Patient is a 55 yo man with a PMH of Obesity class III, A-Fib (s/p 2x cardioversion in 2008,2010, rhythm controlled on Cardizem and Aspirin 81mg), YANIRA on C-pap at come (compliant), + smoking history, 3x a week marijuana smoker (for decades) and hypothyroid (synthroid) presented to the ED on 8/31 after he woke up with "teaspoons of blood" on his pillow.   In the ED endoscopy did not show an active bleed but did identify a 20mm esophageal mass that patient had been told about ~9 years ago was 'fatty tissue'.   CTA CHEST did not show any active bleed or varices.  GI ruled out Upper GI bleed.  ENT assessed the patient and did not find any posterior nasal bleeding.    # Blood per mouth STOPPED likely 2/2 epistaxis 2/2 CPAP machine  endoscopy did not identify an active bleed, varices or ulcerations  transfuse if Hemoglobin <7  ocean NS 2 spr c nos q6, heidi HS    # Esophageal polyp, 20mm  was removed 9 yrs ago at [facility]  s/p EMR today - removed successfully (I spoke w/ Dr Raymond  patient denies dysphagia   IVFs: 1/2 NS 60/hr  diet: clrs tonight, likely adv noelle  carafate 1gm po q6  PPI po q12  outpt GI f/u    # minimal Transaminitis - likely NAFLD  lose wt    # trivial Pericardial Effusion  no further w/u    # A-Fib - CHADSVASc = 0  c/w rate control diltiazem  Carotid duplex: mild stenosis  will eventually need asa 81mg po q24 again - after f/u w/ GI    # Hypothyroid  c/w synthroid    # Nicotine dependence; Marijuana dependence   told pt to quit    # insomnia  valium 10mg po qhs prn - pt request  pt will not get rx for this upon d/c    # BMI 41.4 = morbid obesity  wt loss advised    # DVT Prophylaxis: bilateral SCDs    # GI Prophylaxis: protonix 40 mg     # Activity: Independent    # Code Status: Full     Dispo: anticipate d/c home tomorrow; plan above

## 2021-09-02 NOTE — MEDICAL STUDENT PROGRESS NOTE(EDUCATION) - NS MD HP STUD ASPLAN ASSES FT
56 y M w/ PMH of PAF not on AC, Sleep apnea on CPAP, hemorrhoids found during most recent colonoscopy,  hypothyroidism, and PSHx of esophageal nodule removal 9 years ago presented to ED with dyspnea and hemoptysis. Endoscopy negative for active bleed but notable for upper esophageal nodule pending EGD with removal of nodule today.

## 2021-09-03 ENCOUNTER — TRANSCRIPTION ENCOUNTER (OUTPATIENT)
Age: 56
End: 2021-09-03

## 2021-09-03 VITALS
SYSTOLIC BLOOD PRESSURE: 121 MMHG | RESPIRATION RATE: 18 BRPM | TEMPERATURE: 98 F | HEART RATE: 68 BPM | DIASTOLIC BLOOD PRESSURE: 58 MMHG

## 2021-09-03 LAB
ALBUMIN SERPL ELPH-MCNC: 4.4 G/DL — SIGNIFICANT CHANGE UP (ref 3.5–5.2)
ALP SERPL-CCNC: 62 U/L — SIGNIFICANT CHANGE UP (ref 30–115)
ALT FLD-CCNC: 39 U/L — SIGNIFICANT CHANGE UP (ref 0–41)
ANA TITR SER: NEGATIVE — SIGNIFICANT CHANGE UP
ANION GAP SERPL CALC-SCNC: 12 MMOL/L — SIGNIFICANT CHANGE UP (ref 7–14)
AST SERPL-CCNC: 33 U/L — SIGNIFICANT CHANGE UP (ref 0–41)
BASOPHILS # BLD AUTO: 0.01 K/UL — SIGNIFICANT CHANGE UP (ref 0–0.2)
BASOPHILS NFR BLD AUTO: 0.1 % — SIGNIFICANT CHANGE UP (ref 0–1)
BILIRUB SERPL-MCNC: 1.2 MG/DL — SIGNIFICANT CHANGE UP (ref 0.2–1.2)
BUN SERPL-MCNC: 14 MG/DL — SIGNIFICANT CHANGE UP (ref 10–20)
CALCIUM SERPL-MCNC: 8.7 MG/DL — SIGNIFICANT CHANGE UP (ref 8.5–10.1)
CHLORIDE SERPL-SCNC: 100 MMOL/L — SIGNIFICANT CHANGE UP (ref 98–110)
CO2 SERPL-SCNC: 25 MMOL/L — SIGNIFICANT CHANGE UP (ref 17–32)
CREAT SERPL-MCNC: 0.7 MG/DL — SIGNIFICANT CHANGE UP (ref 0.7–1.5)
EOSINOPHIL # BLD AUTO: 0 K/UL — SIGNIFICANT CHANGE UP (ref 0–0.7)
EOSINOPHIL NFR BLD AUTO: 0 % — SIGNIFICANT CHANGE UP (ref 0–8)
GLUCOSE SERPL-MCNC: 145 MG/DL — HIGH (ref 70–99)
HCT VFR BLD CALC: 36 % — LOW (ref 42–52)
HGB BLD-MCNC: 11 G/DL — LOW (ref 14–18)
IMM GRANULOCYTES NFR BLD AUTO: 0.5 % — HIGH (ref 0.1–0.3)
LYMPHOCYTES # BLD AUTO: 1.21 K/UL — SIGNIFICANT CHANGE UP (ref 1.2–3.4)
LYMPHOCYTES # BLD AUTO: 11.5 % — LOW (ref 20.5–51.1)
MAGNESIUM SERPL-MCNC: 2.1 MG/DL — SIGNIFICANT CHANGE UP (ref 1.8–2.4)
MCHC RBC-ENTMCNC: 20.3 PG — LOW (ref 27–31)
MCHC RBC-ENTMCNC: 30.6 G/DL — LOW (ref 32–37)
MCV RBC AUTO: 66.5 FL — LOW (ref 80–94)
MONOCYTES # BLD AUTO: 0.41 K/UL — SIGNIFICANT CHANGE UP (ref 0.1–0.6)
MONOCYTES NFR BLD AUTO: 3.9 % — SIGNIFICANT CHANGE UP (ref 1.7–9.3)
NEUTROPHILS # BLD AUTO: 8.85 K/UL — HIGH (ref 1.4–6.5)
NEUTROPHILS NFR BLD AUTO: 84 % — HIGH (ref 42.2–75.2)
NRBC # BLD: 0 /100 WBCS — SIGNIFICANT CHANGE UP (ref 0–0)
PLATELET # BLD AUTO: 168 K/UL — SIGNIFICANT CHANGE UP (ref 130–400)
POTASSIUM SERPL-MCNC: 4.3 MMOL/L — SIGNIFICANT CHANGE UP (ref 3.5–5)
POTASSIUM SERPL-SCNC: 4.3 MMOL/L — SIGNIFICANT CHANGE UP (ref 3.5–5)
PROT SERPL-MCNC: 7 G/DL — SIGNIFICANT CHANGE UP (ref 6–8)
RBC # BLD: 5.41 M/UL — SIGNIFICANT CHANGE UP (ref 4.7–6.1)
RBC # FLD: 16.6 % — HIGH (ref 11.5–14.5)
SODIUM SERPL-SCNC: 137 MMOL/L — SIGNIFICANT CHANGE UP (ref 135–146)
SURGICAL PATHOLOGY STUDY: SIGNIFICANT CHANGE UP
WBC # BLD: 10.53 K/UL — SIGNIFICANT CHANGE UP (ref 4.8–10.8)
WBC # FLD AUTO: 10.53 K/UL — SIGNIFICANT CHANGE UP (ref 4.8–10.8)

## 2021-09-03 PROCEDURE — 99232 SBSQ HOSP IP/OBS MODERATE 35: CPT

## 2021-09-03 PROCEDURE — 99239 HOSP IP/OBS DSCHRG MGMT >30: CPT

## 2021-09-03 RX ORDER — BENZOCAINE 10 %
1 GEL (GRAM) MUCOUS MEMBRANE THREE TIMES A DAY
Refills: 0 | Status: DISCONTINUED | OUTPATIENT
Start: 2021-09-03 | End: 2021-09-03

## 2021-09-03 RX ORDER — SUCRALFATE 1 G
1 TABLET ORAL
Qty: 120 | Refills: 0
Start: 2021-09-03 | End: 2021-10-02

## 2021-09-03 RX ORDER — SODIUM CHLORIDE 0.65 %
2 AEROSOL, SPRAY (ML) NASAL
Qty: 240 | Refills: 0
Start: 2021-09-03 | End: 2021-10-02

## 2021-09-03 RX ORDER — ASPIRIN/CALCIUM CARB/MAGNESIUM 324 MG
1 TABLET ORAL
Qty: 0 | Refills: 0 | DISCHARGE

## 2021-09-03 RX ORDER — PANTOPRAZOLE SODIUM 20 MG/1
1 TABLET, DELAYED RELEASE ORAL
Qty: 60 | Refills: 0
Start: 2021-09-03 | End: 2021-10-02

## 2021-09-03 RX ADMIN — PANTOPRAZOLE SODIUM 40 MILLIGRAM(S): 20 TABLET, DELAYED RELEASE ORAL at 05:33

## 2021-09-03 RX ADMIN — Medication 1 GRAM(S): at 11:40

## 2021-09-03 RX ADMIN — Medication 1 GRAM(S): at 00:04

## 2021-09-03 RX ADMIN — CHLORHEXIDINE GLUCONATE 1 APPLICATION(S): 213 SOLUTION TOPICAL at 05:40

## 2021-09-03 RX ADMIN — Medication 1 GRAM(S): at 05:33

## 2021-09-03 RX ADMIN — Medication 2 SPRAY(S): at 11:40

## 2021-09-03 RX ADMIN — Medication 2 SPRAY(S): at 05:33

## 2021-09-03 RX ADMIN — Medication 2 SPRAY(S): at 00:04

## 2021-09-03 RX ADMIN — Medication 120 MILLIGRAM(S): at 05:33

## 2021-09-03 RX ADMIN — Medication 50 MICROGRAM(S): at 05:33

## 2021-09-03 NOTE — PROGRESS NOTE ADULT - ASSESSMENT
56 year old male with PMH of Afib not on anticoagulation, Sleep apnea on CPAP @ night, thalassemia trait , PSH  of esophogeal nodule removed 9 years ago presenting with dyspnea and hemoptysis/hematemesis. Pt reports awoke with bright red blood on his pillow and face. Reports stood up and experienced more episodes of bright red blood with more than a table spoon, unable to decipher to if vomiting up blood or coughing up blood. Reports began to experience epigastric pain upon ED arrival.     #)?Hematemesis/?hemoptysis -Resolved   #)s/p EGD 8/31 ruled out GI bleed (No varices, no active bleeding noted), had esophageal nodule  #)s/p EGD 9/2 EMR of esophageal nodule    Hemodynamically stable   Hb stable  Only on aspirin   CTA negative for bleed bust showed lobulated liver contour, splenomegaly consistent with cirrhosis      Rec:  diet as tolerated  follow up with Dr. Frausto as an OP      #)Mild transaminits hepatocellular likely due to NAFLD vs r/o CLD-Elevated since last 1-2 years-Improved   #)?Cirrhosis (radiological evidence CTA showed lobulated liver contour, splenomegaly consistent with cirrhosis), no biochemical evidence   -Hep A, B, C neag, iron studies negative for hemochromatosis, ceruloplasmin normal, AMA negative, ASMA (1:20) not significantly elevated, immunoglobulin panel normal.      Recs:   avoid hepatotoxic medications  CATHERINE, LKM, soluble liver Antigen, hep E pending  Follow up with Dr. frausto as an OP

## 2021-09-03 NOTE — DISCHARGE NOTE PROVIDER - NSDCCPCAREPLAN_GEN_ALL_CORE_FT
PRINCIPAL DISCHARGE DIAGNOSIS  Diagnosis: Upper GI bleed  Assessment and Plan of Treatment: Gastrointestinal (GI) bleeding is a symptom of a disorder in your digestive tract. The blood often appears in stool or vomit but isn't always visible, though it may cause the stool to look black or tarry. The level of bleeding can range from mild to severe and can be life-threatening.  Sophisticated imaging technology, when needed, can usually locate the cause of the bleeding. Treatment depends on the source of the bleeding.  Symptoms  Signs and symptoms of GI bleeding can be either obvious (overt) or hidden (occult). Signs and symptoms depend on the location of the bleed, which can be anywhere on the GI tract, from where it starts — the mouth — to where it ends — the anus — and the rate of bleeding.  Overt bleeding might show up as:  Vomiting blood, which might be red or might be dark brown and resemble coffee grounds in texture  Black, tarry stool  Rectal bleeding, usually in or with stool  With occult bleeding, you might have:  Lightheadedness  Difficulty breathing  Fainting  Chest pain  Abdominal pain  Symptoms of shock  If your bleeding starts abruptly and progresses rapidly, you could go into shock. Signs and symptoms of shock include:  Drop in blood pressure  Not urinating or urinating infrequently, in small amounts  Rapid pulse  Unconsciousness  When to see a doctor  If you have symptoms of shock, you or someone else should call 911 or your local emergency medical number. If you're vomiting blood, see blood in your stools or have black, tarry stools, seek immediate medical care. For other indications of GI bleeding, make an appointment with your doctor.      SECONDARY DISCHARGE DIAGNOSES  Diagnosis: Anemia  Assessment and Plan of Treatment: Anemia is a condition in which the concentration of red blood cells or hemoglobin in the blood is below normal. Hemoglobin is a substance in red blood cells that carries oxygen to the tissues of the body. Anemia results in not enough oxygen reaching these tissues which can cause symptoms such as weakness, dizziness/lightheadedness, shortness of breath, chest pain, paleness, or nausea. The cause of your anemia may or may not be determined immediately. If your hemoglobin was dangerously low, you may have received a blood transfusion. Usually reactions to transfusions occur immediately but monitor yourself for any fevers, rash, or shortness of breath.  SEEK IMMEDIATE MEDICAL CARE IF YOU HAVE ANY OF THE FOLLOWING SYMPTOMS: extreme weakness/chest pain/shortness of breath, black or bloody stools, vomiting blood, fainting, fever, or any signs of dehydration.      Diagnosis: Cirrhosis  Assessment and Plan of Treatment: Cirrhosis is a late stage of scarring (fibrosis) of the liver caused by many forms of liver diseases and conditions, such as hepatitis and chronic alcoholism.  Each time your liver is injured — whether by disease, excessive alcohol consumption or another cause — it tries to repair itself. In the process, scar tissue forms. As cirrhosis progresses, more and more scar tissue forms, making it difficult for the liver to function (decompensated cirrhosis). Advanced cirrhosis is life-threatening.  The liver damage done by cirrhosis generally can't be undone. But if liver cirrhosis is diagnosed early and the cause is treated, further damage can be limited and, rarely, reversed.  Symptoms  The liver  The liverOpen pop-up dialog box  Cirrhosis often has no signs or symptoms until liver damage is extensive. When signs and symptoms do occur, they may include:  Fatigue  Easily bleeding or bruising  Loss of appetite  Nausea  Swelling in your legs, feet or ankles (edema)  Weight loss  Itchy skin  Yellow discoloration in the skin and eyes (jaundice)  Fluid accumulation in your abdomen (ascites)  Spiderlike blood vessels on your skin  Redness in the palms of the hands  For women, absent or loss of periods not related to menopause  For men, loss of sex drive, breast enlargement (gynecomastia) or testicular atrophy  Confusion, drowsiness and slurred speech (hepatic encephalopathy)  When to see a doctor  Make an appointment with your doctor if you have any of the signs or symptoms listed above.

## 2021-09-03 NOTE — DISCHARGE NOTE PROVIDER - HOSPITAL COURSE
56 y M w/ PMH of PAF not on AC, Sleep apnea on CPAP, hemorrhoids found during most recent colonoscopy,  hypothyroidism, and PSHx of esophageal nodule removal 9 years ago presented to ED with dyspnea and hemoptysis. States that when he woke up this morning, noticed blood and his pillow. When he got out bed of he began to spit up bright red blood, about a tablespoon's worth. Denies similar symptoms in the past. Denies family Hx of liver disease, IV drug use or alcohol use.  Currently denies abdominal pain, nausea, and vomiting.     In ED patient was slightly hypertensive, all other vitals were wnl. There was no evidence of PE in CT angio chest. CT Abdo/pelvis findings were consistent with cirrhosis with lobulated liver contour, enlarged spleen, and gynecomastia. Octreotide injection, and 1G Rocephin  were given.  Patient started on Protonix gtt.      Initial EGD did not show acute bleeding, but only showed upper 1/3rd esophageal mucosal nodule. EGD repeated to remove nodule, sent to pathology for review. Pt can followup outpatient for further evaluation and care.    56 y M w/ PMH of PAF not on AC, Sleep apnea on CPAP, hemorrhoids found during most recent colonoscopy,  hypothyroidism, and PSHx of esophageal nodule removal 9 years ago presented to ED with dyspnea and hemoptysis. States that when he woke up this morning, noticed blood and his pillow. When he got out bed of he began to spit up bright red blood, about a tablespoon's worth. Denies similar symptoms in the past. Denies family Hx of liver disease, IV drug use or alcohol use.  Currently denies abdominal pain, nausea, and vomiting.     In ED patient was slightly hypertensive, all other vitals were wnl. There was no evidence of PE in CT angio chest. CT Abdo/pelvis findings were consistent with cirrhosis with lobulated liver contour, enlarged spleen, and gynecomastia. Octreotide injection, and 1G Rocephin  were given.  Patient started on Protonix gtt.      Echo done which showed trivial pericardial effusion. Initial EGD did not show acute bleeding, but only showed upper 1/3rd esophageal mucosal nodule. EGD repeated to remove nodule, sent to pathology for review. Pt counseled on following lifestyle modifications (eating healthier, weight loss) and following up with outpatient providers. Pt can followup outpatient for further evaluation and care.

## 2021-09-03 NOTE — DISCHARGE NOTE NURSING/CASE MANAGEMENT/SOCIAL WORK - PATIENT PORTAL LINK FT
You can access the FollowMyHealth Patient Portal offered by [facility] by registering at the following website: [email or web address]. By joining [facility]’s FollowMyHealth portal, you will also be able to view your health information using other applications (apps) compatible with our system.

## 2021-09-03 NOTE — PROGRESS NOTE ADULT - ASSESSMENT
Patient is a 57 yo man with a PMH of Obesity class III, A-Fib (s/p 2x cardioversion in 2008, 2010, rhythm controlled on Cardizem and Aspirin 81mg), YANIRA on C-pap at come (compliant), + smoking history, 3x a week marijuana smoker (for decades) and hypothyroid (synthroid) presented to the ED on 8/31 after he woke up with "teaspoons of blood" on his pillow.   In the ED endoscopy did not show an active bleed but did identify a 20mm esophageal mass that patient had been told about ~9 years ago was 'fatty tissue'.   CTA CHEST did not show any active bleed or varices.  GI ruled out Upper GI bleed.  ENT assessed the patient and did not find any posterior nasal bleeding.    # Blood per mouth STOPPED likely 2/2 epistaxis 2/2 CPAP machine  endoscopy did not identify an active bleed, varices or ulcerations  transfuse if Hemoglobin <7  ocean NS 2 spr c nos q6, heidi HS    # Esophageal polyp, 20mm  was removed 9 yrs ago at [facility]  s/p EMR 9/2 - removed successfully (I spoke w/ Dr Raymond on 9/2)  patient denies dysphagia   IVFs: d/c  diet: adv to solids  carafate 1gm po q6  PPI po q12  outpt GI f/u - Dr Davis    # minimal Transaminitis - likely NAFLD  lose wt    # trivial Pericardial Effusion  no further w/u    # A-Fib - CHADSVASc = 0  c/w rate control diltiazem  Carotid duplex: mild stenosis  will eventually need asa 81mg po q24 again - after f/u w/ GI    # Hypothyroid  c/w synthroid    # Nicotine dependence; Marijuana dependence   told pt to quit    # insomnia  valium 10mg po qhs prn - pt request  pt will not get rx for this upon d/c    # BMI 41.4 = morbid obesity  wt loss advised    # DVT Prophylaxis: bilateral SCDs    # GI Prophylaxis: protonix 40 mg     # Activity: Independent    # Code Status: Full     Dispo: d/c home today   outpt GI f/u

## 2021-09-03 NOTE — DISCHARGE NOTE PROVIDER - CARE PROVIDER_API CALL
Angel Ovalle)  Gastroenterology; Internal Medicine  [address] [place] [address]  Phone: [phone]  Fax: [phone]  Follow Up Time: 1 week

## 2021-09-03 NOTE — PROGRESS NOTE ADULT - SUBJECTIVE AND OBJECTIVE BOX
Gastroenterology progress note:     Patient is a 56y old  Male who presents with a chief complaint of Hemoptysis (31 Aug 2021 16:18)       Admitted on: 08-31-21    We are following the patient for hemetemesis, hemoptysis     Interval History:  Feeling better. No more episodes of bleeding. Denies any abd pain, n/v.        PAST MEDICAL & SURGICAL HISTORY:  Sleep apnea with use of continuous positive airway pressure (CPAP)    Atrial fibrillation, unspecified type    Sciatica    Hypothyroidism        MEDICATIONS  (STANDING):  chlorhexidine 4% Liquid 1 Application(s) Topical <User Schedule>  pantoprazole  Injectable 40 milliGRAM(s) IV Push every 12 hours    MEDICATIONS  (PRN):      Allergies  No Known Allergies      Review of Systems:   Cardiovascular:  No Chest Pain, No Palpitations  Respiratory:  No Cough, No Dyspnea  Gastrointestinal:  As described in HPI    Physical Examination:  T(C): 36.7 (09-01-21 @ 04:00), Max: 36.7 (08-31-21 @ 17:30)  HR: 70 (09-01-21 @ 06:00) (60 - 76)  BP: 156/77 (09-01-21 @ 06:00) (132/60 - 167/78)  RR: 15 (09-01-21 @ 06:00) (15 - 36)  SpO2: 95% (09-01-21 @ 06:00) (87% - 100%)  Weight (kg): 138.6 (08-31-21 @ 17:30)    08-31-21 @ 07:01  -  09-01-21 @ 07:00  --------------------------------------------------------  IN: 240 mL / OUT: 1200 mL / NET: -960 mL      Constitutional: No acute distress.  Respiratory:  No signs of respiratory distress. Lung sounds are clear bilaterally.  Cardiovascular:  S1 S2, Regular rate and rhythm.  Abdominal: Abdomen is soft, symmetric, and non-tender without distention. There are no visible lesions or scars. Bowel sounds are present and normoactive in all four quadrants. No masses, hepatomegaly, or splenomegaly are noted.   Skin: No rashes, No Jaundice.        Data:                        10.5   8.87  )-----------( 174      ( 01 Sep 2021 04:51 )             34.1     Hgb trend:  10.5  09-01-21 @ 04:51  11.6  08-31-21 @ 20:21  10.6  08-31-21 @ 08:15        09-01    137  |  101  |  13  ----------------------------<  156<H>  4.3   |  24  |  0.9    Ca    8.6      01 Sep 2021 04:51  Phos  2.9     09-01  Mg     2.0     09-01    TPro  6.8  /  Alb  4.1  /  TBili  1.0  /  DBili  x   /  AST  54<H>  /  ALT  50<H>  /  AlkPhos  58  09-01    Liver panel trend:  TBili 1.0   /   AST 54   /   ALT 50   /   AlkP 58   /   Tptn 6.8   /   Alb 4.1    /   DBili --      09-01  TBili 0.9   /   AST 43   /   ALT 43   /   AlkP 59   /   Tptn 6.8   /   Alb 4.1    /   DBili --      08-31      PT/INR - ( 01 Sep 2021 04:51 )   PT: 14.00 sec;   INR: 1.22 ratio         PTT - ( 01 Sep 2021 04:51 )  PTT:35.6 sec       Radiology:  CT Abdomen and Pelvis w/ IV Cont:   EXAM:  CT ANGIO CHEST PULM [facility]        EXAM:  CT ABDOMEN AND PELVIS IC            PROCEDURE DATE:  08/31/2021            INTERPRETATION:  CLINICAL STATEMENT: Abdominal pain and hemoptysis    TECHNIQUE: Contiguous axial CT images were obtained from the chest to the pubic symphysis . Initially, CT angiogram of the chest was performed after intravenous contrast to evaluate for pulmonary embolism. Then, delayed images of the abdomen pelvis were obtained. Oral contrast was not given.  Reformatted images in the coronal and sagittal planes were acquired. Additional MIP images were obtained    COMPARISON CT: Abdominal pelvic CT 7/20/2014      FINDINGS:    CHEST: There is no evidence of central pulmonary embolism. The thoracic aorta is normal in caliber. There is coronary artery calcifications. The heart size within normal limits. There is a small to moderate pericardial effusion. There is no thoracic lymphadenopathy.   The thyroid Is present, incompletely evaluated on CT. The central tracheobronchial tree is patent. There is no consolidation, pneumothorax or pleural effusion. There is dependent atelectasis and patchy areas of subsegmental atelectasis. There is mild bilateral gynecomastia    HEPATOBILIARY: The left liver is lobulated incontour. There is a 1 cm right hepatic hypodensity on image 27 of series 6, incompletely characterized. Further evaluation with outpatient MRI with gadolinium is recommended. The gallbladder is distended.    SPLEEN: The spleen is enlarged measuring 16 cm.    PANCREAS: Unremarkable..    ADRENAL GLANDS: There is nodular thickening of the left adrenal gland. The right adrenal gland is unremarkable.    KIDNEYS: Subcentimeter hypodensities are too small to characterize. There is no hydronephrosis.    ABDOMINOPELVIC NODES: Unremarkable..    PELVIC ORGANS: There are prostate calcifications. There are small bilateral fat-containing inguinal hernias    PERITONEUM/MESENTERY/BOWEL: There is no free air or obstruction. The appendix is unremarkable.    BONES/SOFT TISSUES: Degenerative change. Old left rib fracture...    OTHER: Vascular calcifications are present..      IMPRESSION:    No evidence of central pulmonary embolism.    Small to moderate pericardial effusion.    Findings consistent with cirrhosis with lobulated liver contour, enlarged spleen, and gynecomastia.    Indeterminate 1 cm right hepatic lesion. Given the above findings of cirrhosis, outpatient MRI with gadolinium is recommended for further characterization.    --- End of Report ---              LUKAS NOEL MD; Attending Radiologist  This document has been electronically signed. Aug 31 2021 10:06AM (08-31-21 @ 08:53)      
VALENTINA LOBO 56y Male  MRN#: [id number]   Hospital Day: 1d    SUBJECTIVE  Patient is a 56y old Male who presents with a chief complaint of Hemoptysis (01 Sep 2021 09:05)  Currently admitted to medicine with the primary diagnosis of Upper GI bleed      INTERVAL HPI AND OVERNIGHT EVENTS:  Patient was examined and seen at bedside. This morning he is resting comfortably in bed and reports no issues or overnight events. HE WORE HIS HOME C-PAP OVERNIGHT WITHOUT COMPLICATION AND IS FEELING WELL TODAY  NO ACUTE OVERNIGHT EVENTS     REVIEW OF SYMPTOMS:  CONSTITUTIONAL: No weakness, fevers or chills; No headaches  EYES: No visual changes, eye pain, or discharge  ENT: No vertigo; No ear pain or change in hearing; No sore throat or difficulty swallowing  NECK: No pain or stiffness  RESPIRATORY: No cough, wheezing; No shortness of breath. NO BLOOD SINCE ADMISSION   CARDIOVASCULAR: No chest pain or palpitations  GASTROINTESTINAL: No abdominal or epigastric pain; No nausea, vomiting,; No diarrhea or constipation; No melena or hematochezia  GENITOURINARY: No dysuria, frequency or hematuria  MUSCULOSKELETAL: No joint pain, no muscle pain, no weakness  NEUROLOGICAL: No numbness or weakness  SKIN: No itching or rashes    OBJECTIVE  PAST MEDICAL & SURGICAL HISTORY  Sleep apnea with use of continuous positive airway pressure (CPAP)    Atrial fibrillation, unspecified type    Sciatica    Hypothyroidism      ALLERGIES:  No Known Allergies    MEDICATIONS:  STANDING MEDICATIONS  chlorhexidine 4% Liquid 1 Application(s) Topical <User Schedule>  diltiazem    milliGRAM(s) Oral daily    PRN MEDICATIONS      VITAL SIGNS: Last 24 Hours  T(C): 36.5 (01 Sep 2021 08:00), Max: 36.7 (31 Aug 2021 17:30)  T(F): 97.7 (01 Sep 2021 08:00), Max: 98.1 (01 Sep 2021 04:00)  HR: 90 (01 Sep 2021 11:00) (60 - 90)  BP: 145/68 (01 Sep 2021 11:00) (132/60 - 175/87)  BP(mean): 69 (01 Sep 2021 11:00) (69 - 128)  RR: 20 (01 Sep 2021 11:00) (15 - 36)  SpO2: 98% (01 Sep 2021 11:44) (87% - 100%)    LABS:                        10.5   8.87  )-----------( 174      ( 01 Sep 2021 04:51 )             34.1     09-01    137  |  101  |  13  ----------------------------<  156<H>  4.3   |  24  |  0.9    Ca    8.6      01 Sep 2021 04:51  Phos  2.9     09-01  Mg     2.0     09-01    TPro  6.8  /  Alb  4.1  /  TBili  1.0  /  DBili  x   /  AST  54<H>  /  ALT  50<H>  /  AlkPhos  58  09-01    PT/INR - ( 01 Sep 2021 04:51 )   PT: 14.00 sec;   INR: 1.22 ratio         PTT - ( 01 Sep 2021 04:51 )  PTT:35.6 sec      Lactate, Blood: 2.4 mmol/L *H* (09-01-21 @ 04:51)      CARDIAC MARKERS ( 31 Aug 2021 08:15 )  x     / <0.01 ng/mL / x     / x     / x          RADIOLOGY:  < from: Xray Chest 1 View- PORTABLE-Routine (Xray Chest 1 View- PORTABLE-Routine in AM.) (09.01.21 @ 06:37) >  Impression:    No radiographic evidence of acute pulmonary process.      < from: CT Angio Chest PE Protocol w/ IV Cont (08.31.21 @ 08:53) >  CHEST: There is no evidence of central pulmonary embolism. The thoracic aorta is normal in caliber. There is coronary artery calcifications. The heart size within normal limits. There is a small to moderate pericardial effusion. There is no thoracic lymphadenopathy.   The thyroid Is present, incompletely evaluated on CT. The central tracheobronchial tree is patent. There is no consolidation, pneumothorax or pleural effusion. There is dependent atelectasis and patchy areas of subsegmental atelectasis. There is mild bilateral gynecomastia    HEPATOBILIARY: The left liver is lobulated incontour. There is a 1 cm right hepatic hypodensity on image 27 of series 6, incompletely characterized. Further evaluation with outpatient MRI with gadolinium is recommended. The gallbladder is distended.    SPLEEN: The spleen is enlarged measuring 16 cm.    PANCREAS: Unremarkable..    ADRENAL GLANDS: There is nodular thickening of the left adrenal gland. The right adrenal gland is unremarkable.    KIDNEYS: Subcentimeter hypodensities are too small to characterize. There is no hydronephrosis.    ABDOMINOPELVIC NODES: Unremarkable..    PELVIC ORGANS: There are prostate calcifications. There are small bilateral fat-containing inguinal hernias    PERITONEUM/MESENTERY/BOWEL: There is no free air or obstruction. The appendix is unremarkable.    BONES/SOFT TISSUES: Degenerative change. Old left rib fracture...    OTHER: Vascular calcifications are present..          PHYSICAL EXAM:  CONSTITUTIONAL: No acute distress, OBESE, COOPERATIVE, GOOD HISTORIAN   HEAD: Atraumatic, normocephalic  EYES:  PERRLA, conjunctiva and sclera clear  ENT: Supple, no masses, no thyromegaly, no bruits, no JVD; moist mucous membranes  PULMONARY: Clear to auscultation bilaterally; no wheezes, rales, or rhonchi  CARDIOVASCULAR: Regular rate and rhythm; no murmurs, rubs, or gallops  GASTROINTESTINAL: Soft, non-tender, non-distended; bowel sounds present  MUSCULOSKELETAL: 2+ peripheral pulses; no clubbing, no cyanosis, no edema  NEUROLOGY: A&OX4  SKIN: No rashes or lesions; warm and dry    
  VALENTINA LOBO  56y  Male  ***My note supersedes ALL resident notes that I sign.  My corrections for their notes are in my note.***    I can be reached directly on [id number]. My office number is [phone]. My personal cell number is [phone].    INTERVAL EVENTS: Here for f/u of esoph polyp. Pt did well. Slept well. Ate solids. Feels ready to go home.    T(F): 98.1 (09-03-21 @ 05:51), Max: 98.2 (09-02-21 @ 21:04)  HR: 68 (09-03-21 @ 05:51) (67 - 68)  BP: 121/58 (09-03-21 @ 05:51) (121/58 - 163/81)  RR: 18 (09-03-21 @ 05:51) (18 - 18)  SpO2: --    Gen: NAD;   HEENT: PERRL, EOMI, mouth clr, nose clr  Neck: no nodes, no JVD, thyroid nl  lungs: clr  hrt: s1 s2 rrr no murmur  abd: soft, NT/ND, no HS megaly  ext: no edema, no c/c  neuro: aa ox3, cn intact, can move all 4 ext    LABS:                      11.0    (    66.5   10.53 )-----------( ---------      168      ( 03 Sep 2021 05:58 )             36.0    (    16.6     Hemoglobin: 11.0 g/dL (09-03 @ 05:58)  Hemoglobin: 10.9 g/dL (09-02 @ 18:28)  Hemoglobin: 10.4 g/dL (09-01 @ 20:00)  Hemoglobin: 10.5 g/dL (09-01 @ 04:51)  Hemoglobin: 11.6 g/dL (08-31 @ 20:21)  Hemoglobin: 10.6 g/dL (08-31 @ 08:15)    137   (   100   (   145      09-03-21 @ 05:58  ----------------------               4.3   (   25   (   14                             -----                        0.7  Ca  8.7   Mg  2.1    P   --     140   (   102   (   150      09-02-21 @ 18:28  ----------------------               4.2   (   26   (   14                             -----                        0.8  Ca  8.6   Mg  2.0    P   --     LFT  7.0  (  1.2  (  33       09-03-21 @ 05:58  -------------------------  4.4  (  62  (  39    PT/INR - ( 02 Sep 2021 08:03 )   PT: 13.80 sec;   INR: 1.20 ratio      RADIOLOGY & ADDITIONAL TESTS:    MEDICATIONS:    benzocaine 20% Spray 1 Spray(s) Topical three times a day PRN  chlorhexidine 4% Liquid 1 Application(s) Topical <User Schedule>  diazepam    Tablet 10 milliGRAM(s) Oral at bedtime PRN  diltiazem    milliGRAM(s) Oral daily  levothyroxine 50 MICROGram(s) Oral daily  pantoprazole    Tablet 40 milliGRAM(s) Oral every 12 hours  sodium chloride 0.45%. 1000 milliLiter(s) IV Continuous <Continuous>  sodium chloride 0.65% Nasal 2 Spray(s) Both Nostrils every 6 hours  sucralfate 1 Gram(s) Oral every 6 hours  
  VALENTINA LOBO  56y  Male  ***My note supersedes ALL resident notes that I sign.  My corrections for their notes are in my note.***    I can be reached directly on [employer] [id number]. My office number is [phone]. My personal cell number is [phone].    INTERVAL EVENTS: Here for f/u of esoph mass.  PT s/p EMR today and did well. Pt has no pain. Bleeding was likely from nose and has stopped. Pt hungry. Pt asking for valium to sleep while in hospital.    T(F): 98.2 (09-02-21 @ 12:02), Max: 98.2 (09-02-21 @ 12:02)  HR: 64 (09-02-21 @ 15:06) (63 - 68)  BP: 149/71 (09-02-21 @ 15:06) (138/76 - 151/73)  RR: 18 (09-02-21 @ 15:06) (18 - 18)  SpO2: 96% (09-02-21 @ 15:06) (96% - 99%)    BMI 41.4    Gen: NAD; has CPAP on  HEENT: PERRL, EOMI, mouth clr, nose clr  Neck: no nodes, no JVD, thyroid nl  lungs: clr  hrt: s1 s2 rrr no murmur  abd: soft, NT/ND, no HS megaly  ext: no edema, no c/c  neuro: aa ox3, cn intact, can move all 4 ext    LABS:                      10.4    (    66.0   12.56 )-----------( ---------      186      ( 01 Sep 2021 20:00 )             33.4    (    16.9     WBC Count: 12.56 K/uL (09-01-21 @ 20:00) - ? stress rxn  WBC Count: 8.87 K/uL (09-01-21 @ 04:51)  WBC Count: 8.49 K/uL (08-31-21 @ 20:21)  WBC Count: 6.17 K/uL (08-31-21 @ 08:15)    Hemoglobin: 10.4 g/dL (09-01 @ 20:00) - stable  Hemoglobin: 10.5 g/dL (09-01 @ 04:51)  Hemoglobin: 11.6 g/dL (08-31 @ 20:21)  Hemoglobin: 10.6 g/dL (08-31 @ 08:15)    140   (   104   (   164      09-01-21 @ 20:00  ----------------------               3.5   (   23   (   17                             -----                        0.8  Ca  8.8   Mg  2.0    P   3.6     LFT  6.5  (  0.8  (  36       09-01-21 @ 20:00  -------------------------  3.9  (  57  (  40    PT/INR - ( 02 Sep 2021 08:03 )   PT: 13.80 sec;   INR: 1.20 ratio    PTT - ( 01 Sep 2021 04:51 )  PTT: 35.6 sec    RADIOLOGY & ADDITIONAL TESTS:  < from: VA Duplex Carotid, Bilat (09.01.21 @ 13:33) >  IMPRESSION:  mild 20-39% stenosis of bilateral carotid arteries.    < end of copied text >    < from: Xray Chest 1 View- PORTABLE-Routine (Xray Chest 1 View- PORTABLE-Routine in AM.) (09.01.21 @ 06:37) >  Impression:    No radiographic evidence of acute pulmonary process.    < end of copied text >    < from: CT Angio Chest PE Protocol w/ IV Cont (08.31.21 @ 08:53) >  IMPRESSION:    No evidence of central pulmonary embolism.    Small to moderate pericardial effusion.    Findings consistent with cirrhosis with lobulated liver contour, enlarged spleen, and gynecomastia.    Indeterminate 1 cm right hepatic lesion. Given the above findings of cirrhosis, outpatient MRI with gadolinium is recommended for further characterization.    < end of copied text >    < from: TTE Echo Complete w/o Contrast w/ Doppler (09.01.21 @ 10:50) >  Summary:   1. LV Ejection Fraction by Toney's Method with a biplane EF of 59 %.   2. Normal global left ventricular systolic function.   3. Spectral Doppler shows impaired relaxation pattern of left ventricular myocardial filling (Grade Idiastolic dysfunction).   4. Mildly increased LV wall thickness.   5. Trivial pericardial effusion.    < end of copied text >    MEDICATIONS:    chlorhexidine 4% Liquid 1 Application(s) Topical <User Schedule>  diazepam    Tablet 10 milliGRAM(s) Oral at bedtime PRN  diltiazem    milliGRAM(s) Oral daily  levothyroxine 50 MICROGram(s) Oral daily  pantoprazole    Tablet 40 milliGRAM(s) Oral every 12 hours  sodium chloride 0.45%. 1000 milliLiter(s) IV Continuous <Continuous>  sucralfate 1 Gram(s) Oral every 6 hours  
Gastroenterology progress note:     Patient is a 56y old  Male who presents with a chief complaint of Hemoptysis (03 Sep 2021 11:18)       Admitted on: 08-31-21    We are following the patient for esophageal nodule     Interval History:  Feeling better, denies any abdominal pain, nausea, vomiting, hematemesis, melena.       PAST MEDICAL & SURGICAL HISTORY:  Sleep apnea with use of continuous positive airway pressure (CPAP)    Atrial fibrillation, unspecified type    Sciatica    Hypothyroidism        MEDICATIONS  (STANDING):  chlorhexidine 4% Liquid 1 Application(s) Topical <User Schedule>  diltiazem    milliGRAM(s) Oral daily  levothyroxine 50 MICROGram(s) Oral daily  pantoprazole    Tablet 40 milliGRAM(s) Oral every 12 hours  sodium chloride 0.45%. 1000 milliLiter(s) (60 mL/Hr) IV Continuous <Continuous>  sodium chloride 0.65% Nasal 2 Spray(s) Both Nostrils every 6 hours  sucralfate 1 Gram(s) Oral every 6 hours    MEDICATIONS  (PRN):  benzocaine 20% Spray 1 Spray(s) Topical three times a day PRN sore throat  diazepam    Tablet 10 milliGRAM(s) Oral at bedtime PRN insomnia      Allergies  No Known Allergies      Review of Systems:   Cardiovascular:  No Chest Pain, No Palpitations  Respiratory:  No Cough, No Dyspnea  Gastrointestinal:  As described in HPI    Physical Examination:  T(C): 36.7 (09-03-21 @ 05:51), Max: 36.8 (09-02-21 @ 21:04)  HR: 68 (09-03-21 @ 05:51) (63 - 68)  BP: 121/58 (09-03-21 @ 05:51) (121/58 - 163/81)  RR: 18 (09-03-21 @ 05:51) (18 - 18)  SpO2: 96% (09-02-21 @ 15:06) (96% - 99%)      09-02-21 @ 07:01  -  09-03-21 @ 07:00  --------------------------------------------------------  IN: 120 mL / OUT: 0 mL / NET: 120 mL      Constitutional: No acute distress.  Respiratory:  No signs of respiratory distress. Lung sounds are clear bilaterally.  Cardiovascular:  S1 S2, Regular rate and rhythm.  Abdominal: Abdomen is soft, symmetric, and non-tender without distention. There are no visible lesions or scars. Bowel sounds are present and normoactive in all four quadrants. No masses, hepatomegaly, or splenomegaly are noted.   Skin: No rashes, No Jaundice.        Data:                        11.0   10.53 )-----------( 168      ( 03 Sep 2021 05:58 )             36.0     Hgb trend:  11.0  09-03-21 @ 05:58  10.9  09-02-21 @ 18:28  10.4  09-01-21 @ 20:00  10.5  09-01-21 @ 04:51  11.6  08-31-21 @ 20:21        09-03    137  |  100  |  14  ----------------------------<  145<H>  4.3   |  25  |  0.7    Ca    8.7      03 Sep 2021 05:58  Phos  3.6     09-01  Mg     2.1     09-03    TPro  7.0  /  Alb  4.4  /  TBili  1.2  /  DBili  x   /  AST  33  /  ALT  39  /  AlkPhos  62  09-03    Liver panel trend:  TBili 1.2   /   AST 33   /   ALT 39   /   AlkP 62   /   Tptn 7.0   /   Alb 4.4    /   DBili --      09-03  TBili 1.0   /   AST 42   /   ALT 44   /   AlkP 62   /   Tptn 7.1   /   Alb 4.3    /   DBili --      09-02  TBili 0.9   /   AST --   /   ALT --   /   AlkP --   /   Tptn --   /   Alb --    /   DBili --      09-02  TBili 0.8   /   AST 36   /   ALT 40   /   AlkP 57   /   Tptn 6.5   /   Alb 3.9    /   DBili --      09-01  TBili 1.0   /   AST 54   /   ALT 50   /   AlkP 58   /   Tptn 6.8   /   Alb 4.1    /   DBili --      09-01  TBili 0.9   /   AST 43   /   ALT 43   /   AlkP 59   /   Tptn 6.8   /   Alb 4.1    /   DBili --      08-31      PT/INR - ( 02 Sep 2021 08:03 )   PT: 13.80 sec;   INR: 1.20 ratio                Radiology:      
Patient is a 56y old  Male who presents with a chief complaint of Hemoptysis (01 Sep 2021 08:00)        Over Night Events:  SP EGD no active GIB.  Possible nasopharyngeal bleed.  NO further bleeding.  On RA         ROS:     All ROS are negative except HPI         PHYSICAL EXAM    ICU Vital Signs Last 24 Hrs  T(C): 36.5 (01 Sep 2021 08:00), Max: 36.7 (31 Aug 2021 17:30)  T(F): 97.7 (01 Sep 2021 08:00), Max: 98.1 (01 Sep 2021 04:00)  HR: 78 (01 Sep 2021 08:00) (60 - 78)  BP: 175/87 (01 Sep 2021 08:00) (132/60 - 175/87)  BP(mean): 128 (01 Sep 2021 08:00) (83 - 128)  ABP: --  ABP(mean): --  RR: 21 (01 Sep 2021 08:00) (15 - 36)  SpO2: 99% (01 Sep 2021 09:04) (87% - 100%)      CONSTITUTIONAL:  Well nourished.  NAD    ENT:   Airway patent,   Mouth with normal mucosa.   No thrush    EYES:   Pupils equal,   Round and reactive to light.    CARDIAC:   Normal rate,   Regular rhythm.    No edema      Vascular:  Normal systolic impulse  No Carotid bruits    RESPIRATORY:   No wheezing  Bilateral BS  Normal chest expansion  Not tachypneic,  No use of accessory muscles    GASTROINTESTINAL:  Abdomen soft,   Non-tender,   No guarding,   + BS    MUSCULOSKELETAL:   Range of motion is not limited,  No clubbing, cyanosis    NEUROLOGICAL:   Alert and oriented   No motor  deficits.    SKIN:   Skin normal color for race,   Warm and dry and intact.   No evidence of rash.    PSYCHIATRIC:   Normal mood and affect.   No apparent risk to self or others.    HEMATOLOGICAL:  No cervical  lymphadenopathy.  no inguinal lymphadenopathy      08-31-21 @ 07:01  -  09-01-21 @ 07:00  --------------------------------------------------------  IN:    Oral Fluid: 240 mL  Total IN: 240 mL    OUT:    Voided (mL): 1200 mL  Total OUT: 1200 mL    Total NET: -960 mL      09-01-21 @ 07:01  -  09-01-21 @ 09:05  --------------------------------------------------------  IN:  Total IN: 0 mL    OUT:    Voided (mL): 650 mL  Total OUT: 650 mL    Total NET: -650 mL          LABS:                            10.5   8.87  )-----------( 174      ( 01 Sep 2021 04:51 )             34.1                                               09-01    137  |  101  |  13  ----------------------------<  156<H>  4.3   |  24  |  0.9    Ca    8.6      01 Sep 2021 04:51  Phos  2.9     09-01  Mg     2.0     09-01    TPro  6.8  /  Alb  4.1  /  TBili  1.0  /  DBili  x   /  AST  54<H>  /  ALT  50<H>  /  AlkPhos  58  09-01      PT/INR - ( 01 Sep 2021 04:51 )   PT: 14.00 sec;   INR: 1.22 ratio         PTT - ( 01 Sep 2021 04:51 )  PTT:35.6 sec                                           CARDIAC MARKERS ( 31 Aug 2021 08:15 )  x     / <0.01 ng/mL / x     / x     / x                                                LIVER FUNCTIONS - ( 01 Sep 2021 04:51 )  Alb: 4.1 g/dL / Pro: 6.8 g/dL / ALK PHOS: 58 U/L / ALT: 50 U/L / AST: 54 U/L / GGT: x                                                                                                                                       MEDICATIONS  (STANDING):  chlorhexidine 4% Liquid 1 Application(s) Topical <User Schedule>  pantoprazole  Injectable 40 milliGRAM(s) IV Push every 12 hours    MEDICATIONS  (PRN):      New X-rays reviewed:                                                                                  ECHO    CXR interpreted by me:  NO infiltrate

## 2021-09-04 LAB — LKM AB SER-ACNC: <20.1 UNITS — SIGNIFICANT CHANGE UP (ref 0–20)

## 2021-09-07 LAB — SOLUBLE LIVER IGG SER IA-ACNC: 0.9 — SIGNIFICANT CHANGE UP (ref 0–20)

## 2021-09-08 LAB
HEV AB FLD QL: NEGATIVE — SIGNIFICANT CHANGE UP
HEV AB FLD QL: NEGATIVE — SIGNIFICANT CHANGE UP
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
## 2021-09-13 DIAGNOSIS — G47.33 OBSTRUCTIVE SLEEP APNEA (ADULT) (PEDIATRIC): ICD-10-CM

## 2021-09-13 DIAGNOSIS — K64.9 UNSPECIFIED HEMORRHOIDS: ICD-10-CM

## 2021-09-13 DIAGNOSIS — Z79.82 LONG TERM (CURRENT) USE OF ASPIRIN: ICD-10-CM
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
## 2021-09-13 DIAGNOSIS — K22.8 OTHER SPECIFIED DISEASES OF ESOPHAGUS: ICD-10-CM

## 2021-09-13 DIAGNOSIS — F12.20 CANNABIS DEPENDENCE, UNCOMPLICATED: ICD-10-CM

## 2021-09-13 DIAGNOSIS — K76.6 PORTAL HYPERTENSION: ICD-10-CM

## 2021-09-13 DIAGNOSIS — M54.30 SCIATICA, UNSPECIFIED SIDE: ICD-10-CM

## 2021-09-13 DIAGNOSIS — F17.210 NICOTINE DEPENDENCE, CIGARETTES, UNCOMPLICATED: ICD-10-CM

## 2021-09-13 DIAGNOSIS — R04.0 EPISTAXIS: ICD-10-CM

## 2021-09-13 DIAGNOSIS — K74.60 UNSPECIFIED CIRRHOSIS OF LIVER: ICD-10-CM

## 2021-09-13 DIAGNOSIS — R04.2 HEMOPTYSIS: ICD-10-CM

## 2021-09-13 DIAGNOSIS — D64.9 ANEMIA, UNSPECIFIED: ICD-10-CM

## 2021-09-13 DIAGNOSIS — T88.8XXA OTHER SPECIFIED COMPLICATIONS OF SURGICAL AND MEDICAL CARE, NOT ELSEWHERE CLASSIFIED, INITIAL ENCOUNTER: ICD-10-CM

## 2021-09-13 DIAGNOSIS — Z80.0 FAMILY HISTORY OF MALIGNANT NEOPLASM OF DIGESTIVE ORGANS: ICD-10-CM

## 2021-09-13 DIAGNOSIS — N62 HYPERTROPHY OF BREAST: ICD-10-CM

## 2021-09-13 DIAGNOSIS — K75.81 NONALCOHOLIC STEATOHEPATITIS (NASH): ICD-10-CM

## 2021-09-13 DIAGNOSIS — G47.00 INSOMNIA, UNSPECIFIED: ICD-10-CM

## 2021-09-13 DIAGNOSIS — I48.0 PAROXYSMAL ATRIAL FIBRILLATION: ICD-10-CM

## 2021-09-13 DIAGNOSIS — E66.01 MORBID (SEVERE) OBESITY DUE TO EXCESS CALORIES: ICD-10-CM

## 2021-09-13 DIAGNOSIS — I31.3 PERICARDIAL EFFUSION (NONINFLAMMATORY): ICD-10-CM

## 2021-09-13 DIAGNOSIS — E03.9 HYPOTHYROIDISM, UNSPECIFIED: ICD-10-CM

## 2021-09-13 DIAGNOSIS — K29.70 GASTRITIS, UNSPECIFIED, WITHOUT BLEEDING: ICD-10-CM

## 2022-01-05 NOTE — ED ADULT TRIAGE NOTE - PAIN: PRESENCE, MLM
denies pain/discomfort Minoxidil Pregnancy And Lactation Text: This medication has not been assigned a Pregnancy Risk Category but animal studies failed to show danger with the topical medication. It is unknown if the medication is excreted in breast milk.

## 2022-02-04 PROBLEM — E03.9 HYPOTHYROIDISM, UNSPECIFIED: Chronic | Status: ACTIVE | Noted: 2021-08-31

## 2022-05-21 ENCOUNTER — APPOINTMENT (OUTPATIENT)
Age: 57
End: 2022-05-21
Payer: COMMERCIAL

## 2022-05-21 VITALS
SYSTOLIC BLOOD PRESSURE: 152 MMHG | BODY MASS INDEX: 39.55 KG/M2 | HEART RATE: 69 BPM | HEIGHT: 72 IN | DIASTOLIC BLOOD PRESSURE: 86 MMHG | OXYGEN SATURATION: 96 % | RESPIRATION RATE: 14 BRPM | WEIGHT: 292 LBS

## 2022-05-21 DIAGNOSIS — G47.33 OBSTRUCTIVE SLEEP APNEA (ADULT) (PEDIATRIC): ICD-10-CM

## 2022-05-21 DIAGNOSIS — E66.9 OBESITY, UNSPECIFIED: ICD-10-CM

## 2022-05-21 PROCEDURE — 99214 OFFICE O/P EST MOD 30 MIN: CPT

## 2022-05-21 NOTE — ASSESSMENT
[FreeTextEntry1] : Assessment:\par YANIRA\par Obesity\par \par PLAN:\par The patient is benefitting from the PAP device .\par New supplies ordered \par Weight loss discussed\par I stressed the need maintain compliance  with the PAP device.\par The patient is not to use an Ozone or UV sterilizer. \par F/U in 12 months\par \par The patient has been using another PAP unit that is not under recall.\par

## 2022-05-21 NOTE — REASON FOR VISIT
[Follow-Up] : a follow-up visit [Sleep Apnea] : sleep apnea [Obesity] : obesity [TextBox_44] : Patient was seen many years ago he has a history of sleep apnea.  He is doing well except that he feels he needs an\par CPAP unit.

## 2022-05-21 NOTE — HISTORY OF PRESENT ILLNESS
[TextBox_4] : I reviewed all the previous sleep test that are available on file.\par I reviewed the previous office notes.\par

## 2022-11-12 ENCOUNTER — NON-APPOINTMENT (OUTPATIENT)
Age: 57
End: 2022-11-12

## 2023-01-08 ENCOUNTER — NON-APPOINTMENT (OUTPATIENT)
Age: 58
End: 2023-01-08

## 2023-01-10 ENCOUNTER — INPATIENT (INPATIENT)
Facility: HOSPITAL | Age: 58
LOS: 0 days | Discharge: HOME | End: 2023-01-11
Attending: INTERNAL MEDICINE | Admitting: INTERNAL MEDICINE
Payer: COMMERCIAL

## 2023-01-10 VITALS
SYSTOLIC BLOOD PRESSURE: 198 MMHG | WEIGHT: 300.05 LBS | RESPIRATION RATE: 19 BRPM | OXYGEN SATURATION: 98 % | DIASTOLIC BLOOD PRESSURE: 88 MMHG | HEART RATE: 70 BPM | TEMPERATURE: 99 F

## 2023-01-10 LAB
ALBUMIN SERPL ELPH-MCNC: 4.6 G/DL — SIGNIFICANT CHANGE UP (ref 3.5–5.2)
ALP SERPL-CCNC: 63 U/L — SIGNIFICANT CHANGE UP (ref 30–115)
ALT FLD-CCNC: 37 U/L — SIGNIFICANT CHANGE UP (ref 0–41)
ANION GAP SERPL CALC-SCNC: 12 MMOL/L — SIGNIFICANT CHANGE UP (ref 7–14)
AST SERPL-CCNC: 40 U/L — SIGNIFICANT CHANGE UP (ref 0–41)
BILIRUB SERPL-MCNC: 1.3 MG/DL — HIGH (ref 0.2–1.2)
BUN SERPL-MCNC: 11 MG/DL — SIGNIFICANT CHANGE UP (ref 10–20)
CALCIUM SERPL-MCNC: 8.8 MG/DL — SIGNIFICANT CHANGE UP (ref 8.4–10.4)
CHLORIDE SERPL-SCNC: 103 MMOL/L — SIGNIFICANT CHANGE UP (ref 98–110)
CO2 SERPL-SCNC: 26 MMOL/L — SIGNIFICANT CHANGE UP (ref 17–32)
CREAT SERPL-MCNC: 0.7 MG/DL — SIGNIFICANT CHANGE UP (ref 0.7–1.5)
EGFR: 107 ML/MIN/1.73M2 — SIGNIFICANT CHANGE UP
GLUCOSE SERPL-MCNC: 114 MG/DL — HIGH (ref 70–99)
HCT VFR BLD CALC: 38.1 % — LOW (ref 42–52)
HGB BLD-MCNC: 11.9 G/DL — LOW (ref 14–18)
MCHC RBC-ENTMCNC: 20.6 PG — LOW (ref 27–31)
MCHC RBC-ENTMCNC: 31.2 G/DL — LOW (ref 32–37)
MCV RBC AUTO: 65.8 FL — LOW (ref 80–94)
NRBC # BLD: 0 /100 WBCS — SIGNIFICANT CHANGE UP (ref 0–0)
PLATELET # BLD AUTO: 197 K/UL — SIGNIFICANT CHANGE UP (ref 130–400)
POTASSIUM SERPL-MCNC: 4.4 MMOL/L — SIGNIFICANT CHANGE UP (ref 3.5–5)
POTASSIUM SERPL-SCNC: 4.4 MMOL/L — SIGNIFICANT CHANGE UP (ref 3.5–5)
PROT SERPL-MCNC: 7.2 G/DL — SIGNIFICANT CHANGE UP (ref 6–8)
RBC # BLD: 5.79 M/UL — SIGNIFICANT CHANGE UP (ref 4.7–6.1)
RBC # FLD: 18 % — HIGH (ref 11.5–14.5)
SARS-COV-2 RNA SPEC QL NAA+PROBE: SIGNIFICANT CHANGE UP
SODIUM SERPL-SCNC: 141 MMOL/L — SIGNIFICANT CHANGE UP (ref 135–146)
WBC # BLD: 8.29 K/UL — SIGNIFICANT CHANGE UP (ref 4.8–10.8)
WBC # FLD AUTO: 8.29 K/UL — SIGNIFICANT CHANGE UP (ref 4.8–10.8)

## 2023-01-10 PROCEDURE — 72125 CT NECK SPINE W/O DYE: CPT | Mod: 26,MA

## 2023-01-10 PROCEDURE — 72141 MRI NECK SPINE W/O DYE: CPT | Mod: 26,MA

## 2023-01-10 PROCEDURE — 99223 1ST HOSP IP/OBS HIGH 75: CPT

## 2023-01-10 PROCEDURE — 99285 EMERGENCY DEPT VISIT HI MDM: CPT

## 2023-01-10 PROCEDURE — 70450 CT HEAD/BRAIN W/O DYE: CPT | Mod: 26,MA

## 2023-01-10 RX ORDER — LEVOTHYROXINE SODIUM 125 MCG
50 TABLET ORAL DAILY
Refills: 0 | Status: DISCONTINUED | OUTPATIENT
Start: 2023-01-10 | End: 2023-01-11

## 2023-01-10 RX ORDER — KETOROLAC TROMETHAMINE 30 MG/ML
15 SYRINGE (ML) INJECTION ONCE
Refills: 0 | Status: DISCONTINUED | OUTPATIENT
Start: 2023-01-10 | End: 2023-01-10

## 2023-01-10 RX ORDER — LIDOCAINE 4 G/100G
1 CREAM TOPICAL DAILY
Refills: 0 | Status: DISCONTINUED | OUTPATIENT
Start: 2023-01-10 | End: 2023-01-11

## 2023-01-10 RX ORDER — GABAPENTIN 400 MG/1
300 CAPSULE ORAL ONCE
Refills: 0 | Status: COMPLETED | OUTPATIENT
Start: 2023-01-10 | End: 2023-01-10

## 2023-01-10 RX ORDER — KETOROLAC TROMETHAMINE 30 MG/ML
30 SYRINGE (ML) INJECTION ONCE
Refills: 0 | Status: DISCONTINUED | OUTPATIENT
Start: 2023-01-10 | End: 2023-01-10

## 2023-01-10 RX ORDER — MORPHINE SULFATE 50 MG/1
6 CAPSULE, EXTENDED RELEASE ORAL ONCE
Refills: 0 | Status: DISCONTINUED | OUTPATIENT
Start: 2023-01-10 | End: 2023-01-10

## 2023-01-10 RX ORDER — LANOLIN ALCOHOL/MO/W.PET/CERES
5 CREAM (GRAM) TOPICAL AT BEDTIME
Refills: 0 | Status: DISCONTINUED | OUTPATIENT
Start: 2023-01-10 | End: 2023-01-11

## 2023-01-10 RX ORDER — METHOCARBAMOL 500 MG/1
500 TABLET, FILM COATED ORAL THREE TIMES A DAY
Refills: 0 | Status: DISCONTINUED | OUTPATIENT
Start: 2023-01-10 | End: 2023-01-10

## 2023-01-10 RX ORDER — ENOXAPARIN SODIUM 100 MG/ML
40 INJECTION SUBCUTANEOUS EVERY 24 HOURS
Refills: 0 | Status: DISCONTINUED | OUTPATIENT
Start: 2023-01-10 | End: 2023-01-11

## 2023-01-10 RX ORDER — PANTOPRAZOLE SODIUM 20 MG/1
40 TABLET, DELAYED RELEASE ORAL
Refills: 0 | Status: DISCONTINUED | OUTPATIENT
Start: 2023-01-10 | End: 2023-01-11

## 2023-01-10 RX ORDER — MORPHINE SULFATE 50 MG/1
2 CAPSULE, EXTENDED RELEASE ORAL EVERY 6 HOURS
Refills: 0 | Status: DISCONTINUED | OUTPATIENT
Start: 2023-01-10 | End: 2023-01-10

## 2023-01-10 RX ORDER — METHOCARBAMOL 500 MG/1
1000 TABLET, FILM COATED ORAL ONCE
Refills: 0 | Status: COMPLETED | OUTPATIENT
Start: 2023-01-10 | End: 2023-01-10

## 2023-01-10 RX ORDER — DILTIAZEM HCL 120 MG
120 CAPSULE, EXT RELEASE 24 HR ORAL DAILY
Refills: 0 | Status: DISCONTINUED | OUTPATIENT
Start: 2023-01-10 | End: 2023-01-11

## 2023-01-10 RX ORDER — LEVOTHYROXINE SODIUM 125 MCG
1 TABLET ORAL
Qty: 0 | Refills: 0 | DISCHARGE

## 2023-01-10 RX ORDER — METHOCARBAMOL 500 MG/1
1000 TABLET, FILM COATED ORAL EVERY 8 HOURS
Refills: 0 | Status: DISCONTINUED | OUTPATIENT
Start: 2023-01-10 | End: 2023-01-11

## 2023-01-10 RX ORDER — DILTIAZEM HCL 120 MG
1 CAPSULE, EXT RELEASE 24 HR ORAL
Qty: 0 | Refills: 0 | DISCHARGE

## 2023-01-10 RX ORDER — ASPIRIN/CALCIUM CARB/MAGNESIUM 324 MG
1 TABLET ORAL
Qty: 0 | Refills: 0 | DISCHARGE

## 2023-01-10 RX ADMIN — Medication 30 MILLIGRAM(S): at 23:52

## 2023-01-10 RX ADMIN — Medication 5 MILLIGRAM(S): at 23:53

## 2023-01-10 RX ADMIN — METHOCARBAMOL 1000 MILLIGRAM(S): 500 TABLET, FILM COATED ORAL at 23:52

## 2023-01-10 RX ADMIN — MORPHINE SULFATE 6 MILLIGRAM(S): 50 CAPSULE, EXTENDED RELEASE ORAL at 14:40

## 2023-01-10 RX ADMIN — METHOCARBAMOL 1000 MILLIGRAM(S): 500 TABLET, FILM COATED ORAL at 11:34

## 2023-01-10 RX ADMIN — GABAPENTIN 300 MILLIGRAM(S): 400 CAPSULE ORAL at 23:53

## 2023-01-10 RX ADMIN — Medication 30 MILLIGRAM(S): at 11:33

## 2023-01-10 NOTE — H&P ADULT - NSHPPHYSICALEXAM_GEN_ALL_CORE
LOS:     VITALS:   T(C): 37.1 (01-10-23 @ 10:38), Max: 37.1 (01-10-23 @ 10:38)  HR: 70 (01-10-23 @ 10:38) (70 - 70)  BP: 198/88 (01-10-23 @ 10:38) (198/88 - 198/88)  RR: 19 (01-10-23 @ 10:38) (19 - 19)  SpO2: 98% (01-10-23 @ 10:38) (98% - 98%)    GENERAL: NAD, lying in bed comfortably  HEAD:  Atraumatic, Normocephalic  EYES: EOMI, PERRLA, conjunctiva and sclera clear  ENT: Moist mucous membranes  NECK: Supple, No JVD  CHEST/LUNG: Clear to auscultation bilaterally; No rales, rhonchi, wheezing, or rubs. Unlabored respirations  HEART: Regular rate and rhythm; No murmurs, rubs, or gallops  ABDOMEN: BSx4; Soft, nontender, nondistended  EXTREMITIES:  2+ Peripheral Pulses, brisk capillary refill. No clubbing, cyanosis, or edema  NERVOUS SYSTEM:  A&Ox3, no focal deficits   SKIN: No rashes or lesions VITALS:   T(C): 37.1 (01-10-23 @ 10:38), Max: 37.1 (01-10-23 @ 10:38)  HR: 70 (01-10-23 @ 10:38) (70 - 70)  BP: 198/88 (01-10-23 @ 10:38) (198/88 - 198/88)  RR: 19 (01-10-23 @ 10:38) (19 - 19)  SpO2: 98% (01-10-23 @ 10:38) (98% - 98%)    GENERAL: NAD, lying in bed comfortably  HEAD:  Atraumatic, Normocephalic  CHEST/LUNG: Clear to auscultation bilaterally; No wheezing   HEART: Regular rate and rhythm; No murmurs, rubs, or gallops  ABDOMEN: BSx4; Soft, nontender, nondistended  EXTREMITIES:  2+ Peripheral Pulses, brisk capillary refill. No clubbing, cyanosis, or edema  NERVOUS SYSTEM:  A&Ox3, no focal deficits

## 2023-01-10 NOTE — ED PROVIDER NOTE - ATTENDING APP SHARED VISIT CONTRIBUTION OF CARE
57-year-old male with past medical history of A. fib on Cardizem no anticoagulation hypothyroid presents here complaining of acute onset neck pain that started Friday suddenly has been worsening over the last few days has been using Tiger balm ibuprofen and went to an urgent care yesterday received a lidocaine injection and tizanidine however pain worsened today with head heaviness with patient called EMS to come to the ED no fevers no chills no trauma had an episode of numbness to the left hand yesterday.  CONSTITUTIONAL: WA / WN / NAD  HEAD: NCAT  EYES: PERRL; EOMI;   ENT: Normal pharynx; mucous membranes pink/moist, no erythema.  NECK: Supple; +midline C3/4 ttp and b/l trapezius muscle ttp  MSK/EXT: No gross deformities; full range of motion.  SKIN: Warm and dry;   NEURO: AAOx3, Motor 5/5 x 4 extremities  PSYCH: Memory Intact, Normal Affect

## 2023-01-10 NOTE — ED PROVIDER NOTE - NS ED ATTENDING STATEMENT MOD
This was a shared visit with the TULIO. I reviewed and verified the documentation and independently performed the documented:

## 2023-01-10 NOTE — ED ADULT NURSE NOTE - NS ED NURSE DISCH DISPOSITION
Nutrition Care Plan    Nutrition Diagnosis:   Inadequate intake related to attempt to lose weight prior to surgery as evidenced by food recall and discussion with patient indicating 2 unbalanced meals/day with HS snack accompanied by weight loss. Intervention:  Priority modifications:   Education focus: 1) adequate calories, protein and fluid intake for healing and recovery, 2) protein sources, 3) fluid needs and 4) nutrition supplement as needed to meet protein needs. Referred to handout: Nutrition Tips for Surgery    Monitoring and Evaluation:  Area(s) and level of knowledge:   Patient receptive to recommendations and acknowledged changes when returns home. No further nutrition intervention at this time. Will sign off on nutrition services. Re-consult ad need arises. Admitted

## 2023-01-10 NOTE — PROGRESS NOTE ADULT - SUBJECTIVE AND OBJECTIVE BOX
Neurosurgery Consultation Note      HPI  This is a 57 year old male pmh afib, YANIRA, hypothyroidism presenting for cervical neck pain.  Pt states that on Friday the pt developed acute pressure like posterior neck pain that progressively traveled to b/l shoulders.  Pt was previously seen at urgent care yesterday and was given Tizanidine and lidocaine injections with out relief of symptoms.  In ED CT C spine demonstrating ossification of posterior longitudinal ligament at C2 and C3 and large midline and right-sided disc osteophyte complex with compression of the right side of the spinal cord at C2-C3    Pt seen and examined at the bedside.  At present time the pt is resting in bed in NAD.  Pt with +mild TTP about the mid C spine, BURGESS x4 with good strength.  Pt endorses he recieved morphine in the ED without relief of neck pain.  Pt denies weakness, difficulty ambulating recent trauma, paresthesia.          Subjective: 57yMale with a pmhx of ^NECK PAIN    No pertinent family history in first degree relatives    Family history of colon cancer in father (Father)    MEWS Score    Sleep apnea with use of continuous positive airway pressure (CPAP)    Atrial fibrillation, unspecified type    Sciatica    Hypothyroidism    No significant past surgical history    NECK PAIN    90+    SysAdmin_VisitLink      s/p     Allergies    No Known Allergies    Intolerances        Vital Signs Last 24 Hrs  T(C): 37.1 (10 Librado 2023 10:38), Max: 37.1 (10 Librado 2023 10:38)  T(F): 98.7 (10 Librado 2023 10:38), Max: 98.7 (10 Librado 2023 10:38)  HR: 70 (10 Librado 2023 10:38) (70 - 70)  BP: 198/88 (10 Librado 2023 10:38) (198/88 - 198/88)  BP(mean): --  RR: 19 (10 Librado 2023 10:38) (19 - 19)  SpO2: 98% (10 Librado 2023 10:38) (98% - 98%)              REVIEW OF SYSTEMS    [ x] A ten-point review of systems was otherwise negative except as noted.  [ ] Due to altered mental status/intubation, subjective information were not able to be obtained from the patient. History was obtained, to the extent possible, from review of the chart and collateral sources of information.      Exam:  + TTP c spine  No T/L spine TTP  Resting in bed in NAD  AAOX3. Verbal function intact  tongue midline, facial motions symmetric  Motor: MAEx4  b/l  intact  5/5 power in b/l UE  5/5 RLE  4+/5 LLE baseline work related injury 1 year ago  5/5 b/l DF/PF  Sensation: SILT  No denson  No clonus      CBC Full  -  ( 10 Librado 2023 13:10 )  WBC Count : 8.29 K/uL  RBC Count : 5.79 M/uL  Hemoglobin : 11.9 g/dL  Hematocrit : 38.1 %  Platelet Count - Automated : 197 K/uL  Mean Cell Volume : 65.8 fL  Mean Cell Hemoglobin : 20.6 pg  Mean Cell Hemoglobin Concentration : 31.2 g/dL  Auto Neutrophil # : x  Auto Lymphocyte # : x  Auto Monocyte # : x  Auto Eosinophil # : x  Auto Basophil # : x  Auto Neutrophil % : x  Auto Lymphocyte % : x  Auto Monocyte % : x  Auto Eosinophil % : x  Auto Basophil % : x    01-10    141  |  103  |  11  ----------------------------<  114<H>  4.4   |  26  |  0.7    Ca    8.8      10 Librado 2023 13:10    TPro  7.2  /  Alb  4.6  /  TBili  1.3<H>  /  DBili  x   /  AST  40  /  ALT  37  /  AlkPhos  63  01-10        Imaging:  < from: CT Cervical Spine No Cont (01.10.23 @ 12:17) >    IMPRESSION:    1.  Congenitally narrowed spinal canal.    2.  Straightening of the normal cervical lordosis may be secondary to   patient positioning or muscle spasm.    3.  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    4.  C2-C3; large midline and right-sided disc osteophyte complex with   compression of the right side of the spinal cord.    5.  C3-C4; disc osteophyte complex with narrowing of the spinal canal,   degenerative changes, and left foraminal narrowing.    6.  C4-C5; midline and right-sided disc osteophyte complex with narrowing   of the spinal canal, degenerative changes, and right foraminal narrowing.    7.  C5-C6; midline disc osteophyte complex with narrowing of the spinal   canal, degenerative changes, and bilateral foraminal narrowing.    8.  C6-C7; midline and right-sided disc osteophyte complex with narrowing   of the spinal canal and possible spinal cord compression.    9.  T1-T2; incompletely imaged, disc osteophyte complex with narrowing of   the spinal canal.    MRI of the cervical spine is recommended for further evaluation if not   contraindicated in this patient.    The case was discussed with Dr. Mcclure on January 10, 2023 time 12:43 PM,   with read back.    --- End of Report ---            LEONOR CLEARY MD; Attending Radiologist  This document has been electronically signed. Librado 10 2023 12:46PM    < end of copied text >      Assessment/Plan:  This is a 57 year old male presenting for neck pain    Images reviewed  Stable Neurological exam  No acute Neurosurgery Intervention  agree with MRI C spine  Pain control

## 2023-01-10 NOTE — ED PROVIDER NOTE - CONSIDERATION OF ADMISSION OBSERVATION
patient with cord compression of cervical spine, will need admission for completion of MRI and pain control Consideration of Admission/Observation

## 2023-01-10 NOTE — ED PROVIDER NOTE - NS ED ROS FT
Constitutional: (-) fever, (-) chills  Cardiovascular: (-) chest pain, (-) syncope  Respiratory: (-) cough, (-) shortness of breath, (-) dyspnea,   Gastrointestinal: (-) vomiting, (-) diarrhea, (-)nausea,  Musculoskeletal: (-) neck pain, (-) back pain, (-) joint pain,  Neurological: (-) headache, (-) loc, (-) dizziness, (-) tingling, (-)numbness,

## 2023-01-10 NOTE — H&P ADULT - ATTENDING COMMENTS
Patient seen and examined at bedside independently of the residents. I read the resident's note and agree with the plan with the additions and corrections as noted below.    REVIEW OF SYSTEMS:  CONSTITUTIONAL: No weakness, fevers or chills  EYES/ENT: No visual changes;  No vertigo or throat pain   NECK: + neck pain  RESPIRATORY: No cough, wheezing, hemoptysis; No shortness of breath  CARDIOVASCULAR: No chest pain or palpitations  GASTROINTESTINAL: No abdominal or epigastric pain. No nausea, vomiting, or hematemesis; No diarrhea or constipation. No melena or hematochezia.  GENITOURINARY: No dysuria, frequency or hematuria  NEUROLOGICAL: No numbness or weakness  MSK: No pain. No weakness.   SKIN: No itching, rashes.     PMH: Hypothyroidism, Afib not on AC, YANIRA on CPAP    FHx: Reviewed. No fhx of asthma/copd, No fhx of liver and pulmonary disease. No fhx of hematological disorder.     Physical Exam:  GEN: No acute distress. Awake, Alert and oriented x 3.   Head: Atraumatic, Normocephalic.   Eye: PEERLA. No sclera icterus. EOMI.   ENT: Normal oropharynx, no thyromegaly, no mass, no lymphadenopathy.   LUNGS: Clear to auscultation bilaterally. No wheeze/rales/crackles.   HEART: Normal. S1/S2 present. RRR. No murmur/gallops.   ABD: Soft, non-tender, non-distended. Bowel sounds present.   EXT: No pitting edema. No erythema. No tenderness.  Integumentary: No rash, No sore, No petechia.   NEURO: CN III-XII intact. Strength: 5/5 b/l ULE. Sensory intact b/l ULE.     Vital Signs Last 24 Hrs  T(C): 36.2 (10 Librado 2023 21:49), Max: 37.1 (10 Librado 2023 10:38)  T(F): 97.1 (10 Librado 2023 21:49), Max: 98.7 (10 Librado 2023 10:38)  HR: 84 (10 Librado 2023 21:49) (70 - 84)  BP: 186/83 (10 Librado 2023 21:49) (186/83 - 198/88)  BP(mean): --  RR: 18 (10 Librado 2023 21:49) (18 - 19)  SpO2: 96% (10 Librado 2023 21:22) (96% - 98%)    Parameters below as of 10 Librado 2023 21:22  Patient On (Oxygen Delivery Method): room air      Please see the above notes for Labs and radiology.     Assessment and Plan:     56 yo M with hx of Hypothyroidism, Afib not on AC, YANIRA on CPAP presented to the ED for neck pain radiates down to the shoulder blades since last Friday    Neck pain likely 2/2 cervical myelopathy (r/o cord compression)   - CT C-spine shows large midline osteophyte with compression of right side of spinal cord @ C2-C3  - Neurosurgery evaluated patient in ED and recommends MRI C-spine  - patient couldn't tolerate MRI in ED.   - will order MRI c-spine with sedation.     Hypothyroidism - c/w synthroid.     Chronic A.fib - on Cardizem     YANIRA - CPAP qhs at home     DVT ppx: Lovenox SC  GI ppx:  not indicated.   Diet: regular diet  Activity: as tolerated.     Date seen by the attendin/10/2023 Patient seen and examined at bedside independently of the residents. I read the resident's note and agree with the plan with the additions and corrections as noted below.    REVIEW OF SYSTEMS:  CONSTITUTIONAL: No weakness, fevers or chills  EYES/ENT: No visual changes;  No vertigo or throat pain   NECK: + neck pain  RESPIRATORY: No cough, wheezing, hemoptysis; No shortness of breath  CARDIOVASCULAR: No chest pain or palpitations  GASTROINTESTINAL: No abdominal or epigastric pain. No nausea, vomiting, or hematemesis; No diarrhea or constipation. No melena or hematochezia.  GENITOURINARY: No dysuria, frequency or hematuria  NEUROLOGICAL: No numbness or weakness  MSK: No pain. No weakness.   SKIN: No itching, rashes.     PMH: Hypothyroidism, Afib not on AC, YANIRA on CPAP    FHx: Reviewed. No fhx of asthma/copd, No fhx of liver and pulmonary disease. No fhx of hematological disorder.     Physical Exam:  GEN: No acute distress. Awake, Alert and oriented x 3.   Head: Atraumatic, Normocephalic.   Neck: Posterior neck tenderness.   Eye: PEERLA. No sclera icterus. EOMI.   ENT: Normal oropharynx, no thyromegaly, no mass, no lymphadenopathy.   LUNGS: Clear to auscultation bilaterally. No wheeze/rales/crackles.   HEART: Normal. S1/S2 present. RRR. No murmur/gallops.   ABD: Soft, non-tender, non-distended. Bowel sounds present.   EXT: No pitting edema. No erythema. No tenderness.  Integumentary: No rash, No sore, No petechia.   NEURO: CN III-XII intact. Strength: 5/5 b/l ULE. Sensory intact b/l ULE.     Vital Signs Last 24 Hrs  T(C): 36.2 (10 Librado 2023 21:49), Max: 37.1 (10 Librado 2023 10:38)  T(F): 97.1 (10 Librado 2023 21:49), Max: 98.7 (10 Librado 2023 10:38)  HR: 84 (10 Librado 2023 21:49) (70 - 84)  BP: 186/83 (10 Librado 2023 21:49) (186/83 - 198/88)  BP(mean): --  RR: 18 (10 Librado 2023 21:49) (18 - 19)  SpO2: 96% (10 Librado 2023 21:22) (96% - 98%)    Parameters below as of 10 Librado 2023 21:22  Patient On (Oxygen Delivery Method): room air      Please see the above notes for Labs and radiology.     Assessment and Plan:     56 yo M with hx of Hypothyroidism, Afib not on AC, YANIRA on CPAP presented to the ED for neck pain radiates down to the shoulder blades since last Friday    Neck pain likely 2/2 cervical myelopathy (r/o cord compression)   - CT C-spine shows large midline osteophyte with compression of right side of spinal cord @ C2-C3  - Neurosurgery evaluated patient in ED and recommends MRI C-spine  - patient couldn't tolerate MRI in ED.   - will order MRI c-spine with sedation.     Hypothyroidism - c/w synthroid.     Chronic A.fib - on Cardizem     YANIRA - CPAP qhs at home     DVT ppx: Lovenox SC  GI ppx:  not indicated.   Diet: regular diet  Activity: as tolerated.     Date seen by the attendin/10/2023

## 2023-01-10 NOTE — H&P ADULT - NSHPLABSRESULTS_GEN_ALL_CORE
11.9   8.29  )-----------( 197      ( 10 Librado 2023 13:10 )             38.1     01-10    141  |  103  |  11  ----------------------------<  114<H>  4.4   |  26  |  0.7    Ca    8.8      10 Librado 2023 13:10    TPro  7.2  /  Alb  4.6  /  TBili  1.3<H>  /  DBili  x   /  AST  40  /  ALT  37  /  AlkPhos  63  01-10              LIVER FUNCTIONS - ( 10 Librado 2023 13:10 )  Alb: 4.6 g/dL / Pro: 7.2 g/dL / ALK PHOS: 63 U/L / ALT: 37 U/L / AST: 40 U/L / GGT: x                     RADIOLOGY & ADDITIONAL STUDIES:    IMPRESSION:    1.  Congenitally narrowed spinal canal.    2.  Straightening of the normal cervical lordosis may be secondary to   patient positioning or muscle spasm.    3.  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    4.  C2-C3; large midline and right-sided disc osteophyte complex with   compression of the right side of the spinal cord.    5.  C3-C4; disc osteophyte complex with narrowing of the spinal canal,   degenerative changes, and left foraminal narrowing.    6.  C4-C5; midline and right-sided disc osteophyte complex with narrowing   of the spinal canal, degenerative changes, and right foraminal narrowing.    7.  C5-C6; midline disc osteophyte complex with narrowing of the spinal   canal, degenerative changes, and bilateral foraminal narrowing.    8.  C6-C7; midline and right-sided disc osteophyte complex with narrowing   of the spinal canal and possible spinal cord compression.    9.  T1-T2; incompletely imaged, disc osteophyte complex with narrowing of   the spinal canal.    MRI of the cervical spine is recommended for further evaluation if not   contraindicated in this patient.

## 2023-01-10 NOTE — H&P ADULT - SOCIAL HISTORY
RN team,  Fine to send pended refill to preferred pharmacy when Ana responds.  Thanks!  Ave   
Spoke with patient and pharmacy verified.  Rx sent.  
Yes

## 2023-01-10 NOTE — ED PROVIDER NOTE - OBJECTIVE STATEMENT
57 male history of kidney stones, hypothyroid, A. fib not on anticoagulation presenting to ED for 3 days of posterior neck neck pain states that the pain has gotten worse over the last 3 days, denies any trauma to the neck or falls no weakness to the arms chest.  Denies CP, SOB, blurry vision, HA 57 male history of kidney stones, hypothyroid, A. fib not on anticoagulation presenting to ED for 3 days of posterior neck pain states that the pain has gotten worse over the last 3 days. went to [facility] yesterday for symptoms and was given lidocaine injection and tizanidine denies any trauma to the neck or falls no weakness to the arms chest.  Denies CP, SOB, blurry vision, HA

## 2023-01-10 NOTE — ED PROVIDER NOTE - PROGRESS NOTE DETAILS
SR: spoke with radiologist, + disc herniation with cord compression, mri approved, will c/s neurosx SR: patient spoken to about ct scans, continues to have pain , more meds ordered SR: spoke with radiologist as patient could not tolerate complete mri, will likely required anesthesia, spoek with neurosx aware of findings, patient will require admission.

## 2023-01-10 NOTE — H&P ADULT - HISTORY OF PRESENT ILLNESS
57 male history of kidney stones, hypothyroid, A. fib not on anticoagulation presenting to ED for 3 days of posterior neck pain states that the pain has gotten worse over the last 3 days. [place] [facility] [place] and was given lidocaine injection and tizanidine denies any trauma to the neck or falls no weakness to the arms chest.  Denies CP, SOB, blurry vision, HA 56 y/o male with PMHx of Hypothyroidism, Afib not on AC, YANIRA on CPAP presented to the ED for neck pain since last Friday. Patient reports that he woke up with posterior neck pain that radiated down to the shoulder blades. He denies any radiation down his spine or up over his head. No UE or LE weakness, or urinary/bowel incontinence. He had gone to the urgent care yesterday and was given local lidocaine injection and tizandine but was not helping.   Denies any fevers, chills, chest pain, SOB, abdominal pain, nausea, vomiting, diarrhea, constipation.    In the ED, VS noted for T 37, HR 70, /88, RR 19, SpO2 98% on RA. Labs noted for WBC 8. CT cervical spine shows large midline osteophyte with compression of right side of spinal cord @ C2-C3. MRI recommended. Evaluated by Neurosurgery no intervention. MRI c-spine attempted but unable to tolerated. Admitted for MRI with sedation.

## 2023-01-10 NOTE — ED PROVIDER NOTE - PHYSICAL EXAMINATION
VITAL SIGNS: I have reviewed nursing notes and confirm.  CONSTITUTIONAL:  in no acute distress.  SKIN: Skin exam is warm and dry, no acute rash.  HEAD: Normocephalic; atraumatic.  EYES: PERRL, EOM intact; conjunctiva and sclera clear.  ENT: No nasal discharge; airway clear  NECK: Supple;  tender to cerviavl neck  CARD: S1, S2 normal; no murmurs, gallops, or rubs. Regular rate and rhythm.  RESP: No wheezes, rales or rhonchi. Speaking in full sentences.   ABD: soft; non-distended; non-tender  EXT: Normal ROM. No clubbing, cyanosis or edema.  NEURO: Alert, oriented. Grossly unremarkable. No focal deficits. VITAL SIGNS: I have reviewed nursing notes and confirm.  CONSTITUTIONAL:  in no acute distress.  SKIN: Skin exam is warm and dry, no acute rash.  HEAD: Normocephalic; atraumatic.  EYES: PERRL, EOM intact; conjunctiva and sclera clear.  ENT: No nasal discharge; airway clear  NECK: Supple;  tender to cervical neck  CARD: S1, S2 normal; no murmurs, gallops, or rubs. Regular rate and rhythm.  RESP: No wheezes, rales or rhonchi. Speaking in full sentences.   ABD: soft; non-distended; non-tender  EXT: Normal ROM. No clubbing, cyanosis or edema.  NEURO: Alert, oriented. Grossly unremarkable. No focal deficits.

## 2023-01-10 NOTE — ED PROVIDER NOTE - CLINICAL SUMMARY MEDICAL DECISION MAKING FREE TEXT BOX
57-year-old male with past medical history of A. fib on Cardizem no anticoagulation hypothyroid presents here complaining of acute onset neck pain that started Friday suddenly has been worsening over the last few days has been using Tiger balm ibuprofen and went to an urgent care yesterday received a lidocaine injection and tizanidine however pain worsened today with head heaviness with patient called EMS to come to the ED no fevers no chills no trauma had an episode of numbness to the left hand yesterday vs reviewed ct head/cervical spine ordered pain medication provided , spoke with radiologist reguarding findings of possible cord compression requiring MRI imaging, neurosx was consulted, patient went for MRI but could not tolerate entire MRI reuqiring admission for completion of MRI and continued pain control as multiple doses of meds given. S/o provided to LUIS Hayes

## 2023-01-10 NOTE — H&P ADULT - ASSESSMENT
58 y/o male with PMHx of Hypothyroidism, Afib not on AC, YANIRA on CPAP presented to the ED for neck pain since last Friday.    #Posterior Neck Pain r/o cord compression   - CT cervical spine shows large midline osteophyte with compression of right side of spinal cord @ C2-C3  - MRI C-spine attempted but failed  - reordered MRI, needs sedation   - Neurosurgery following   - pain control with morphine PRN     #Hypothyroidism  - continue with synthroid    #Afib not on AC  - CHADVASC 0  - continue with cardizem     #DVT PPx: Lovenox  #GI Ppx: not indicated  #Regular

## 2023-01-11 ENCOUNTER — TRANSCRIPTION ENCOUNTER (OUTPATIENT)
Age: 58
End: 2023-01-11

## 2023-01-11 VITALS
RESPIRATION RATE: 18 BRPM | HEART RATE: 69 BPM | OXYGEN SATURATION: 93 % | SYSTOLIC BLOOD PRESSURE: 180 MMHG | TEMPERATURE: 98 F | DIASTOLIC BLOOD PRESSURE: 92 MMHG

## 2023-01-11 DIAGNOSIS — M54.2 CERVICALGIA: ICD-10-CM

## 2023-01-11 LAB
A1C WITH ESTIMATED AVERAGE GLUCOSE RESULT: 5.8 % — HIGH (ref 4–5.6)
ALBUMIN SERPL ELPH-MCNC: 4.6 G/DL — SIGNIFICANT CHANGE UP (ref 3.5–5.2)
ALP SERPL-CCNC: 65 U/L — SIGNIFICANT CHANGE UP (ref 30–115)
ALT FLD-CCNC: 36 U/L — SIGNIFICANT CHANGE UP (ref 0–41)
ANION GAP SERPL CALC-SCNC: 12 MMOL/L — SIGNIFICANT CHANGE UP (ref 7–14)
AST SERPL-CCNC: 36 U/L — SIGNIFICANT CHANGE UP (ref 0–41)
BASOPHILS # BLD AUTO: 0.06 K/UL — SIGNIFICANT CHANGE UP (ref 0–0.2)
BASOPHILS NFR BLD AUTO: 0.8 % — SIGNIFICANT CHANGE UP (ref 0–1)
BILIRUB SERPL-MCNC: 1.3 MG/DL — HIGH (ref 0.2–1.2)
BUN SERPL-MCNC: 16 MG/DL — SIGNIFICANT CHANGE UP (ref 10–20)
CALCIUM SERPL-MCNC: 8.9 MG/DL — SIGNIFICANT CHANGE UP (ref 8.4–10.5)
CHLORIDE SERPL-SCNC: 100 MMOL/L — SIGNIFICANT CHANGE UP (ref 98–110)
CHOLEST SERPL-MCNC: 164 MG/DL — SIGNIFICANT CHANGE UP
CO2 SERPL-SCNC: 24 MMOL/L — SIGNIFICANT CHANGE UP (ref 17–32)
CREAT SERPL-MCNC: 0.8 MG/DL — SIGNIFICANT CHANGE UP (ref 0.7–1.5)
EGFR: 103 ML/MIN/1.73M2 — SIGNIFICANT CHANGE UP
EOSINOPHIL # BLD AUTO: 0.16 K/UL — SIGNIFICANT CHANGE UP (ref 0–0.7)
EOSINOPHIL NFR BLD AUTO: 2.2 % — SIGNIFICANT CHANGE UP (ref 0–8)
ESTIMATED AVERAGE GLUCOSE: 120 MG/DL — HIGH (ref 68–114)
GLUCOSE SERPL-MCNC: 156 MG/DL — HIGH (ref 70–99)
HCT VFR BLD CALC: 36.2 % — LOW (ref 42–52)
HCV AB S/CO SERPL IA: 0.04 COI — SIGNIFICANT CHANGE UP
HCV AB SERPL-IMP: SIGNIFICANT CHANGE UP
HDLC SERPL-MCNC: 34 MG/DL — LOW
HGB BLD-MCNC: 11.4 G/DL — LOW (ref 14–18)
IMM GRANULOCYTES NFR BLD AUTO: 0.5 % — HIGH (ref 0.1–0.3)
LIPID PNL WITH DIRECT LDL SERPL: 104 MG/DL — HIGH
LYMPHOCYTES # BLD AUTO: 1.79 K/UL — SIGNIFICANT CHANGE UP (ref 1.2–3.4)
LYMPHOCYTES # BLD AUTO: 24.2 % — SIGNIFICANT CHANGE UP (ref 20.5–51.1)
MAGNESIUM SERPL-MCNC: 2 MG/DL — SIGNIFICANT CHANGE UP (ref 1.8–2.4)
MCHC RBC-ENTMCNC: 20.5 PG — LOW (ref 27–31)
MCHC RBC-ENTMCNC: 31.5 G/DL — LOW (ref 32–37)
MCV RBC AUTO: 65.2 FL — LOW (ref 80–94)
MONOCYTES # BLD AUTO: 0.38 K/UL — SIGNIFICANT CHANGE UP (ref 0.1–0.6)
MONOCYTES NFR BLD AUTO: 5.1 % — SIGNIFICANT CHANGE UP (ref 1.7–9.3)
NEUTROPHILS # BLD AUTO: 4.97 K/UL — SIGNIFICANT CHANGE UP (ref 1.4–6.5)
NEUTROPHILS NFR BLD AUTO: 67.2 % — SIGNIFICANT CHANGE UP (ref 42.2–75.2)
NON HDL CHOLESTEROL: 130 MG/DL — HIGH
NRBC # BLD: 0 /100 WBCS — SIGNIFICANT CHANGE UP (ref 0–0)
PLATELET # BLD AUTO: 135 K/UL — SIGNIFICANT CHANGE UP (ref 130–400)
POTASSIUM SERPL-MCNC: 3.8 MMOL/L — SIGNIFICANT CHANGE UP (ref 3.5–5)
POTASSIUM SERPL-SCNC: 3.8 MMOL/L — SIGNIFICANT CHANGE UP (ref 3.5–5)
PROT SERPL-MCNC: 6.8 G/DL — SIGNIFICANT CHANGE UP (ref 6–8)
RBC # BLD: 5.55 M/UL — SIGNIFICANT CHANGE UP (ref 4.7–6.1)
RBC # FLD: 18 % — HIGH (ref 11.5–14.5)
SODIUM SERPL-SCNC: 136 MMOL/L — SIGNIFICANT CHANGE UP (ref 135–146)
TRIGL SERPL-MCNC: 134 MG/DL — SIGNIFICANT CHANGE UP
WBC # BLD: 7.4 K/UL — SIGNIFICANT CHANGE UP (ref 4.8–10.8)
WBC # FLD AUTO: 7.4 K/UL — SIGNIFICANT CHANGE UP (ref 4.8–10.8)

## 2023-01-11 PROCEDURE — 99239 HOSP IP/OBS DSCHRG MGMT >30: CPT

## 2023-01-11 PROCEDURE — 72141 MRI NECK SPINE W/O DYE: CPT | Mod: 26

## 2023-01-11 RX ORDER — METHOCARBAMOL 500 MG/1
2 TABLET, FILM COATED ORAL
Qty: 84 | Refills: 0
Start: 2023-01-11 | End: 2023-01-24

## 2023-01-11 RX ORDER — POLYETHYLENE GLYCOL 3350 17 G/17G
17 POWDER, FOR SOLUTION ORAL
Qty: 510 | Refills: 0
Start: 2023-01-11 | End: 2023-02-09

## 2023-01-11 RX ORDER — SENNA PLUS 8.6 MG/1
2 TABLET ORAL
Qty: 60 | Refills: 0
Start: 2023-01-11 | End: 2023-02-09

## 2023-01-11 RX ORDER — DICLOFENAC SODIUM 75 MG/1
1 TABLET, DELAYED RELEASE ORAL
Qty: 21 | Refills: 0
Start: 2023-01-11 | End: 2023-01-17

## 2023-01-11 RX ORDER — OXYCODONE HYDROCHLORIDE 5 MG/1
1 TABLET ORAL
Qty: 28 | Refills: 0
Start: 2023-01-11 | End: 2023-01-17

## 2023-01-11 RX ORDER — PANTOPRAZOLE SODIUM 20 MG/1
1 TABLET, DELAYED RELEASE ORAL
Qty: 30 | Refills: 0
Start: 2023-01-11 | End: 2023-02-09

## 2023-01-11 RX ORDER — LIDOCAINE 4 G/100G
1 CREAM TOPICAL
Qty: 14 | Refills: 0
Start: 2023-01-11 | End: 2023-01-24

## 2023-01-11 RX ORDER — ACETAMINOPHEN 500 MG
3 TABLET ORAL
Qty: 270 | Refills: 0
Start: 2023-01-11 | End: 2023-02-09

## 2023-01-11 RX ORDER — OXYCODONE HYDROCHLORIDE 5 MG/1
15 TABLET ORAL EVERY 6 HOURS
Refills: 0 | Status: DISCONTINUED | OUTPATIENT
Start: 2023-01-11 | End: 2023-01-11

## 2023-01-11 RX ORDER — ACETAMINOPHEN 500 MG
975 TABLET ORAL EVERY 8 HOURS
Refills: 0 | Status: DISCONTINUED | OUTPATIENT
Start: 2023-01-11 | End: 2023-01-11

## 2023-01-11 RX ORDER — SENNA PLUS 8.6 MG/1
2 TABLET ORAL AT BEDTIME
Refills: 0 | Status: DISCONTINUED | OUTPATIENT
Start: 2023-01-11 | End: 2023-01-11

## 2023-01-11 RX ORDER — DICLOFENAC SODIUM 75 MG/1
50 TABLET, DELAYED RELEASE ORAL EVERY 8 HOURS
Refills: 0 | Status: DISCONTINUED | OUTPATIENT
Start: 2023-01-11 | End: 2023-01-11

## 2023-01-11 RX ORDER — POLYETHYLENE GLYCOL 3350 17 G/17G
17 POWDER, FOR SOLUTION ORAL DAILY
Refills: 0 | Status: DISCONTINUED | OUTPATIENT
Start: 2023-01-11 | End: 2023-01-11

## 2023-01-11 RX ADMIN — Medication 50 MICROGRAM(S): at 06:43

## 2023-01-11 RX ADMIN — Medication 4 MILLIGRAM(S): at 12:33

## 2023-01-11 RX ADMIN — Medication 120 MILLIGRAM(S): at 08:55

## 2023-01-11 RX ADMIN — METHOCARBAMOL 1000 MILLIGRAM(S): 500 TABLET, FILM COATED ORAL at 06:43

## 2023-01-11 RX ADMIN — Medication 30 MILLIGRAM(S): at 02:29

## 2023-01-11 RX ADMIN — PANTOPRAZOLE SODIUM 40 MILLIGRAM(S): 20 TABLET, DELAYED RELEASE ORAL at 06:43

## 2023-01-11 NOTE — DISCHARGE NOTE PROVIDER - NSDCCPCAREPLAN_GEN_ALL_CORE_FT
PRINCIPAL DISCHARGE DIAGNOSIS  Diagnosis: Cervical spinal cord compression  Assessment and Plan of Treatment: You came in with neck pain and were evaluated by the neurosurgery team. It was determined based on your MRI scan that your c-spine lesion is stable and Neurosurgery recommended outpatient follow up and a neck brace which was given to you on discharge.  You can return to work on 1/13/2023. Please use caution when moving your head.  Follow up with PCP and Neurosurgery within 1 week

## 2023-01-11 NOTE — CONSULT NOTE ADULT - PROBLEM SELECTOR RECOMMENDATION 9
No history of chronic opioid use. Low risk of opioid abuse per ORT. Avoid hydromorphone and morphine per patient reported AEs.  1) Start oxycodone IR 15mg Q6h prn; stop percocet  2) Continue methocarbamol 1000mg Q8h standing  3) Start acetaminophen 1000mg Q8h standing  4) Start diclofenac 50mg Q8h standing  5) Start miralax, senna to prevent opioid induced constipation

## 2023-01-11 NOTE — DISCHARGE NOTE PROVIDER - PROVIDER TOKENS
PROVIDER:[TOKEN:[[id number]],FOLLOWUP:[1 week]],PROVIDER:[TOKEN:[[id number]],FOLLOWUP:[1 week]]

## 2023-01-11 NOTE — DISCHARGE NOTE NURSING/CASE MANAGEMENT/SOCIAL WORK - PATIENT PORTAL LINK FT
You can access the FollowMyHealth Patient Portal offered by [facility] by registering at the following website: [email or web address]. By joining [facility]’s FollowMyHealth portal, you will also be able to view your health information using other applications (apps) compatible with our system.

## 2023-01-11 NOTE — PROGRESS NOTE ADULT - SUBJECTIVE AND OBJECTIVE BOX
VALENTINA LOBO 57y Male  MRN#: [id number]   Hospital Day: 1d    SUBJECTIVE  Patient is a 57y old Male who presents with a chief complaint of Currently admitted to medicine with the primary diagnosis of Cervical spinal cord compression      INTERVAL HPI AND OVERNIGHT EVENTS:  Patient was examined and seen at bedside. This morning he is resting comfortably in bed and reports no issues or overnight events.    OBJECTIVE  PAST MEDICAL & SURGICAL HISTORY  Sleep apnea with use of continuous positive airway pressure (CPAP)    Atrial fibrillation, unspecified type    Sciatica    Hypothyroidism      ALLERGIES:  Allergy Status Unknown    MEDICATIONS:  STANDING MEDICATIONS  diltiazem    Tablet 120 milliGRAM(s) Oral daily  enoxaparin Injectable 40 milliGRAM(s) SubCutaneous every 24 hours  levothyroxine 50 MICROGram(s) Oral daily  lidocaine   4% Patch 1 Patch Transdermal daily  methocarbamol 1000 milliGRAM(s) Oral every 8 hours  pantoprazole    Tablet 40 milliGRAM(s) Oral before breakfast    PRN MEDICATIONS  melatonin 5 milliGRAM(s) Oral at bedtime PRN  oxycodone    5 mG/acetaminophen 325 mG 2 Tablet(s) Oral every 6 hours PRN      VITAL SIGNS: Last 24 Hours  T(C): 36.4 (11 Jan 2023 08:15), Max: 37.1 (10 Librado 2023 10:38)  T(F): 97.6 (11 Jan 2023 08:15), Max: 98.7 (10 Librado 2023 10:38)  HR: 68 (11 Jan 2023 08:15) (60 - 89)  BP: 181/83 (11 Jan 2023 08:15) (141/73 - 198/88)  BP(mean): --  RR: 18 (11 Jan 2023 08:15) (16 - 19)  SpO2: 95% (11 Jan 2023 06:35) (95% - 98%)    LABS:                        11.9   8.29  )-----------( 197      ( 10 Librado 2023 13:10 )             38.1     01-10    141  |  103  |  11  ----------------------------<  114<H>  4.4   |  26  |  0.7    Ca    8.8      10 Librado 2023 13:10    TPro  7.2  /  Alb  4.6  /  TBili  1.3<H>  /  DBili  x   /  AST  40  /  ALT  37  /  AlkPhos  63  01-10      RADIOLOGY:  < from: MR Cervical Spine No Cont (01.10.23 @ 16:09) >    ACC: [id number] EXAM:  MR SPINE CERVICAL                          PROCEDURE DATE:  01/10/2023          INTERPRETATION:  Clinical History / Reason for exam: Disc herniation seen   on the prior study for evaluation of spinal cord compression.    MRI OFTHE CERVICAL SPINE WITHOUT CONTRAST    TECHNIQUE:    Localizer images were obtained, the patient refused further examination.    COMPARISON:    Noncontrast CT scan of the cervical spine dated January 10, 2023 time   11:55 AM.    FINDINGS/  IMPRESSION:    The study limited to sagittal localizer images demonstrate a large   ventral area of abnormal signal intensity at C2-C3 extending inferiorly   behind the C3 vertebral body with presumed spinal cord compression.    Spoke with HARLAN STINSON DO; Attending Em on 1/10/2023 4:16 PM with   readback.    --- End of Report ---            LEONOR CLEARY MD; Attending Radiologist  This document has been electronically signed. Librado 10 2023  4:26PM    < end of copied text >  < from: CT Cervical Spine No Cont (01.10.23 @ 12:17) >    ACC: [id number] EXAM:  CT CERVICAL SPINE                          PROCEDURE DATE:  01/10/2023          INTERPRETATION:  Clinical History / Reason for exam: Neck pain.    CT SCAN OF THE CERVICAL SPINE WITHOUT CONTRAST    TECHNIQUE:    Multiple transaxial noncontrast CT images of the cervical spine were   obtained from C1 through C7 - T1.  Sagittal and coronal reformatted   images were performed as well.    COMPARISON:    Noncontrast CT scan of the cervical spine dated April 29, 2006.    FINDINGS:    No acute displaced cervical spine fracture is demonstrated.    Straightening of the normal cervical lordosis may be secondary to patient   positioning or muscle spasm.    The cervical spinal canal appears narrowed on a congenital basis.    Ossification of the posterior longitudinal ligament extending from C2   inferior endplate to the inferior endplate of C3 resulting in mild   narrowing of the AP diameter of the spinal canal..    Nuchal ligament ossification/calcification posterior to C4 and C5.    Opacification of the left mastoid air cells likely secondary to   inflammation or infection.    Degenerative changes about C1-C2    At C2-C3 large midline and right-sided disc osteophyte complex extending   inferiorly behind the superior endplate of C3 likely representing at   least moderate spinal cord compression of the anterior right side of the   spinal cord. There is moderate to severe narrowing of the AP diameter of   the spinal canal. There is no bony narrowing of the neural foramen.    At C3-C4 mild midline disc osteophyte complex resulting in mild narrowing   of the AP diameter of the spinal canal. There are hypertrophic facet   changes and degenerative changes arising from the uncovertebral joints   resulting in mild left neural foraminal narrowing. No bony narrowing of   the right neural foramen.    At C4-C5 mild midline and right-sided disc osteophyte complex resulting   in mild narrowing of the AP diameter of the spinal canal. There are   hypertrophic facet changes and degenerative changes arising from the   uncovertebral joints resulting in mild right neural foraminal narrowing.   No bony narrowing of the left neural foramen.    At C5-C6 mild midline disc osteophyte complex resulting in mild narrowing   of the AP diameter of the spinal canal. There are hypertrophic facet   changes and degenerative changes arising from the uncovertebral joints   resulting in mild bilateral neural foraminal narrowing.    At C6-C7 mild to moderate midline and right-sided disc osteophyte complex   resulting in mild to moderate of the AP diameter of the spinal canal   greater to the right of midline. Possible spinal cord compression. There   is no bony narrowing of the neural foramen.    At C7-T1 no bony narrowing of the AP diameter of the spinal canal or   neural foramen.    At T1-T2 incompletely imaged, mild midline disc osteophyte complex   resulting in mild narrowing of the AP diameter spinal canal.    IMPRESSION:    1.  Congenitally narrowed spinal canal.    2.  Straightening of the normal cervical lordosis may be secondary to   patient positioning or muscle spasm.    3.  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    4.  C2-C3; large midline and right-sided disc osteophyte complex with   compression of the right side of the spinal cord.    5.  C3-C4; disc osteophyte complex with narrowing of the spinal canal,   degenerative changes, and left foraminal narrowing.    6.  C4-C5; midline and right-sided disc osteophyte complex with narrowing   of the spinal canal, degenerative changes, and right foraminal narrowing.    7.  C5-C6; midline disc osteophyte complex with narrowing of the spinal   canal, degenerative changes, and bilateral foraminal narrowing.    8.  C6-C7; midline and right-sided disc osteophyte complex with narrowing   of the spinal canal and possible spinal cord compression.    9.  T1-T2; incompletely imaged, disc osteophyte complex with narrowing of   the spinal canal.    MRI of the cervical spine is recommended for further evaluation if not   contraindicated in this patient.    The case was discussed with Dr. Mcclure on January 10, 2023 time 12:43 PM,   with read back.    --- End of Report ---            LEONOR CLEARY MD; Attending Radiologist  This document has been electronically signed. Librado 10 2023 12:46PM    < end of copied text >  < from: CT Head No Cont (01.10.23 @ 12:05) >    ACC: [id number] EXAM:  CT BRAIN                          PROCEDURE DATE:  01/10/2023          INTERPRETATION:  Clinical History / Reason for exam: Severe headaches and   neck pain.    CT SCAN OF THE BRAIN WITHOUT CONTRAST    TECHNIQUE:    Multiple transaxial noncontrast CT images of the brain were obtained from   base to vertex. Sagittal and coronal reformatted images were obtained.    COMPARISON:    Noncontrast CT scan of the brain dated November 25, 2013.    FINDINGS:    The third and lateral ventricles are mildly enlarged as are the cortical   sulci consistent with a mild degree of cortical atrophy.  The fourth   ventricle is normal in size and position.    There is no shift of the midline structures, evidence of acute   intracranial hemorrhage, or depressed skull fracture.    Opacification of the left mastoid air cells likely secondary to   inflammation or infection.    IMPRESSION:    In comparison with the prior CT scan of the brain dated November 24, 2013:    Interval development ofcerebral atrophy.    --- End of Report ---            LEONOR CLEARY MD; Attending Radiologist  This document has been electronically signed. Librado 10 2023 12:43PM    < end of copied text >      PHYSICAL EXAM:  CONSTITUTIONAL: No acute distress, AAOx3  HEENT: Atraumatic, normocephalic, neck supple, EOMI, PERRLA  PULMONARY: Clear to auscultation bilaterally  CARDIOVASCULAR: Regular rate and rhythm  GASTROINTESTINAL: Soft, non-tender, non-distended; bowel sounds present  MUSCULOSKELETAL: no edema, head more comfortable leaning toward R side, pain/tenderness with movement toward L  NEUROLOGY: non-focal, equal strength b/l in UE and LE  SKIN: warm and dry

## 2023-01-11 NOTE — PROGRESS NOTE ADULT - ASSESSMENT
56 y/o male with PMHx of Hypothyroidism, Afib not on AC, YANIRA on CPAP presented to the ED for neck pain since last Friday.    #Posterior Neck Pain admitted to r/o cord compression   - Stable neurological exam (patient able to move UE, no noted deficits in strength, + 10/10 pain/tenderness when turning head otherwise stable)  - CT cervical spine shows large midline osteophyte with compression of right side of spinal cord @ C2-C3  - MRI C-spine attempted but failed - pt could not tolerate  - reordered MRI, needs sedation  - Neurosurgery following - recs appreciated - no acute neurosurgical intervention  - C/w pain control PRN     #Hypothyroidism  - c/w with synthroid    #Afib not on AC  - CHADVASC 0  - c/w cardizem     #DVT PPx: Lovenox  #GI Ppx: not indicated  #Regular  56 y/o male with PMHx of Hypothyroidism, Afib not on AC, YANIRA on CPAP presented to the ED for neck pain since last Friday.    #Posterior Neck Pain admitted to r/o cord compression   - Stable neurological exam (patient able to move UE, no noted deficits in strength, + 10/10 pain/tenderness when turning head otherwise stable)  - CT cervical spine shows large midline osteophyte with compression of right side of spinal cord @ C2-C3  - MRI C-spine attempted but failed - pt could not tolerate  - reordered MRI, needs sedation - pending  - Neurosurgery following - recs appreciated - no acute neurosurgical intervention  - C/w pain control PRN     #Hypothyroidism  - c/w with synthroid    #Afib not on AC  - CHADVASC 0  - c/w cardizem     #DVT PPx: Lovenox  #GI Ppx: not indicated  #Regular   #Pending: MRI, Neurosgx fu after MRI - per MRI tech MRI late afternoon today earliest or tonight at latest

## 2023-01-11 NOTE — DISCHARGE NOTE PROVIDER - ATTENDING DISCHARGE PHYSICAL EXAMINATION:
Vital Signs Last 24 Hrs  T(F): 97.6 (11 Jan 2023 08:15), Max: 98.4 (11 Jan 2023 01:43)  HR: 68 (11 Jan 2023 08:15) (60 - 89)  BP: 181/83 (11 Jan 2023 08:15) (141/73 - 188/87)  RR: 18 (11 Jan 2023 08:15) (16 - 18)  SpO2: 95% (11 Jan 2023 06:35) (95% - 96%)    PHYSICAL EXAM:  GENERAL: NAD, well-groomed, well-developed  HEAD:  Atraumatic, Normocephalic  EYES: EOMI, conjunctiva and sclera clear  ENMT: Moist mucous membranes, Good dentition, no thrush  NECK: Supple, No JVD  CHEST/LUNG: Clear to auscultation bilaterally, good air entry, non-labored breathing  HEART: RRR; S1/S2, No murmur  ABDOMEN: Soft, Nontender, Nondistended; Bowel sounds present  VASCULAR: Normal pulses, Normal capillary refill  EXTREMITIES: No calf tenderness, No cyanosis, No edema  LYMPH: Normal; No lymphadenopathy noted  SKIN: Warm, Intact  PSYCH: Normal mood, Normal affect  NERVOUS SYSTEM:  A/O x3, Good concentration; CN 2-12 intact, No focal deficits

## 2023-01-11 NOTE — DISCHARGE NOTE PROVIDER - CARE PROVIDERS DIRECT ADDRESSES
,DirectAddress_Unknown,[email or web address]

## 2023-01-11 NOTE — DISCHARGE NOTE PROVIDER - CARE PROVIDER_API CALL
Ger Johnson)  Surgery  Neurosurgery  [address], [address]  [place] [address]  Phone: [phone]  Fax: [phone]  Follow Up Time: 1 week    Damion Kate)  Internal Medicine  [address]  [place], [place] [id number]  Phone: [phone]  Fax: [phone]  Follow Up Time: 1 week

## 2023-01-11 NOTE — CONSULT NOTE ADULT - SUBJECTIVE AND OBJECTIVE BOX
Pain Medicine Consult Note    History of Present Illness  Patient is a 56 y/o man with history of obesity, Afib on ATC, hypothyroidism, YANIRA, and nephrolithiasis who was admitted on 1/10/2023 with pain in the posterior neck. The patient states that he started to develop pain while sitting on 1/6/2023. He denies any history of trauma or focal injury. He states that he felt the pain for the next few days and tried to use a variety of OTC remedies, including acetaminophen, NSAIDs, and topical ointments and patches. The patient states that the pain continued to get worse and was severe enough on 1/9 that he went to urgent care. The patient states that he received trigger point injections with lidocaine and tizanidine, neither of which helped significantly with his pain. Pain got progressively worse until the time of admission. At this point, the patient endorses an aching, throbbing pain at the base of the posterior cervical spine that radiates to the suprascapular region bilaterally. He had transient numbness in the left hand that resolved. No focal weakness, ataxia, bowel or bladder incontinence, or saddle anesthesia. The patient states that pain is worse with cervical rotation bilaterally. He denies any history of chronic opioid therapy.     Current Inpatient Medication Regimen:  diltiazem    Tablet 120 milliGRAM(s) Oral daily  enoxaparin Injectable 40 milliGRAM(s) SubCutaneous every 24 hours  levothyroxine 50 MICROGram(s) Oral daily  lidocaine   4% Patch 1 Patch Transdermal daily  melatonin 5 milliGRAM(s) Oral at bedtime PRN  methocarbamol 1000 milliGRAM(s) Oral every 8 hours  oxycodone    5 mG/acetaminophen 325 mG 2 Tablet(s) Oral every 6 hours PRN  pantoprazole    Tablet 40 milliGRAM(s) Oral before breakfast      Home Analgesic Regimen:  None    Allergies:  Allergy Status Unknown  morphine (Other; Pruritus)      Past Medical History:  Obesity  Dorsalgia, unspecified  Essential (primary) hypertension  Generalized anxiety disorder  Hypothyroidism  Low back pain  Nonalcoholic steatohepatitis (GALLEGOS)  Obstructive sleep apnea (adult)  Afib  Nephroliathis    Past Surgical History:  Left 5th digit surgery  Tympanostomy    Family History:  cancer (father)  CAD (mother)    Social History:  Tobacco - 1/4 PPD  EtOH - denies  Drugs - +occasional marijuana      Review of Systems:  General: no fevers or chills  Eyes: no diplopia or blurred vision  ENT: no rhinorrhea  CV: no chest pain  Resp: no cough or dyspnea  GI: no abdominal pain, constipation, or diarrhea  : no urinary incontinence or dysuria  Neuro: +intermittent left hand numbness  Psych: no depression or anxiety    Physical Exam:  T(C): 36.4 (01-11-23 @ 08:15), Max: 36.9 (01-11-23 @ 01:43)  HR: 68 (01-11-23 @ 08:15) (60 - 89)  BP: 181/83 (01-11-23 @ 08:15) (141/73 - 188/87)  RR: 18 (01-11-23 @ 08:15) (16 - 18)  SpO2: 95% (01-11-23 @ 06:35) (95% - 96%)  Gen: NAD  Neck: limited extension 2/2 pain, some TTP over the lower cervical region  Eyes: no glasses or scleral icterus  Head: Normocephalic / Atraumatic  CV: no JVD  Lungs: nonlabored breathing  Abdomen: nondistended, soft  : no cope catheter in place  Neuro: AOx3, Cranial nerves intact, +5/5 strength in upper and lower extremities  Extremities: full ROM in upper/lower extremities  Psych: normal affect      Labs:  CBC  7.40 K/uL [4.80 - 10.80] > 11.4 g/dL<L> [14.0 - 18.0] / 36.2 %<L> [42.0 - 52.0] < 135 K/uL [130 - 400]      BMP  136 mmol/L [135 - 146] | 100 mmol/L [98 - 110] | 16 mg/dL [10 - 20]  3.8 mmol/L [3.5 - 5.0] | 24 mmol/L [17 - 32] | 0.8 mg/dL [0.7 - 1.5]    156 mg/dL<H> [70 - 99]          Imaging Studies:  CT Cervical Spine (1/10/2023)  FINDINGS:    No acute displaced cervical spine fracture is demonstrated.    Straightening of the normal cervical lordosis may be secondary to patient   positioning or muscle spasm.    The cervical spinal canal appears narrowed on a congenital basis.    Ossification of the posterior longitudinal ligament extending from C2   inferior endplate to the inferior endplate of C3 resulting in mild   narrowing of the AP diameter of the spinal canal..    Nuchal ligament ossification/calcification posterior to C4 and C5.    Opacification of the left mastoid air cells likely secondary to   inflammation or infection.    Degenerative changes about C1-C2    At C2-C3 large midline and right-sided disc osteophyte complex extending   inferiorly behind the superior endplate of C3 likely representing at   least moderate spinal cord compression of the anterior right side of the   spinal cord. There is moderate to severe narrowing of the AP diameter of   the spinal canal. There is no bony narrowing of the neural foramen.    At C3-C4 mild midline disc osteophyte complex resulting in mild narrowing   of the AP diameter of the spinal canal. There are hypertrophic facet   changes and degenerative changes arising from the uncovertebral joints   resulting in mild left neural foraminal narrowing. No bony narrowing of   the right neural foramen.    At C4-C5 mild midline and right-sided disc osteophyte complex resulting   in mild narrowing of the AP diameter of the spinal canal. There are   hypertrophic facet changes and degenerative changes arising from the   uncovertebral joints resulting in mild right neural foraminal narrowing.   No bony narrowing of the left neural foramen.    At C5-C6 mild midline disc osteophyte complex resulting in mild narrowing   of the AP diameter of the spinal canal. There are hypertrophic facet   changes and degenerative changes arising from the uncovertebral joints   resulting in mild bilateral neural foraminal narrowing.    At C6-C7 mild to moderate midline and right-sided disc osteophyte complex   resulting in mild to moderate of the AP diameter of the spinal canal   greater to the right of midline. Possible spinal cord compression. There   is no bony narrowing of the neural foramen.    At C7-T1 no bony narrowing of the AP diameter of the spinal canal or   neural foramen.    At T1-T2 incompletely imaged, mild midline disc osteophyte complex   resulting in mild narrowing of the AP diameter spinal canal.    IMPRESSION:    1.  Congenitally narrowed spinal canal.    2.  Straightening of the normal cervical lordosis may be secondary to   patient positioning or muscle spasm.    3.  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    4.  C2-C3; large midline and right-sided disc osteophyte complex with   compression of the right side of the spinal cord.    5.  C3-C4; disc osteophyte complex with narrowing of the spinal canal,   degenerative changes, and left foraminal narrowing.    6.  C4-C5; midline and right-sided disc osteophyte complex with narrowing   of the spinal canal, degenerative changes, and right foraminal narrowing.    7.  C5-C6; midline disc osteophyte complex with narrowing of the spinal   canal, degenerative changes, and bilateral foraminal narrowing.    8.  C6-C7; midline and right-sided disc osteophyte complex with narrowing   of the spinal canal and possible spinal cord compression.    9.  T1-T2; incompletely imaged, disc osteophyte complex with narrowing of   the spinal canal.    MRI of the cervical spine is recommended for further evaluation if not   contraindicated in this patient.        Opioid Risk Assessment Tool                                                                         Female       Male  Family History  Alcohol                                                              1                3  Illegal drugs                                                       2                3  Rx drugs                                                            4                4    Personal History   Alcohol                                                              3                3  Illegal drugs                                                       4                4  Rx drugs                                                            5                5    Age between 16—45 years                                1                1  History of preadolescent sexual abuse               3                0    Psychological disease  ADD, OCD, bipolar, schizophrenia                      2                2  Depression                                                       1                1    Total Score                                                      __              0    0 - 3 = low risk for future opioid abuse  4 - 7 = moderate risk for future opioid abuse  8+ = high risk for future opioid abuse

## 2023-01-11 NOTE — DISCHARGE NOTE PROVIDER - HOSPITAL COURSE
ED COURSE:      58 y/o male with PMHx of Hypothyroidism, Afib not on AC, YANIRA on CPAP presented to the ED for neck pain since last Friday. Patient reports that he woke up with posterior neck pain that radiated down to the shoulder blades. He denies any radiation down his spine or up over his head. No UE or LE weakness, or urinary/bowel incontinence. He had gone to the urgent care yesterday and was given local lidocaine injection and tizandine but was not helping.   Denies any fevers, chills, chest pain, SOB, abdominal pain, nausea, vomiting, diarrhea, constipation.    In the ED, VS noted for T 37, HR 70, /88, RR 19, SpO2 98% on RA. Labs noted for WBC 8. CT cervical spine shows large midline osteophyte with compression of right side of spinal cord @ C2-C3. MRI recommended. Evaluated by Neurosurgery no intervention. MRI c-spine attempted but unable to tolerated. Admitted for MRI with sedation.       Hospital Course:    Pt admitted for MRI C- spine, received 4mg IV ativan and underwent C-Spine MRI Which shows: < from: MR Cervical Spine No Cont (01.11.23 @ 13:15) >    Right paracentral disc osteophyte complex at C2-C3 causing mass effect upon the spinal cord without abnormal spinal cord signal.    Multilevel moderate to severe spinal canal stenosis on a congenital basis in concert with degenerative changes.      Neurosurgery follow up recommended Neck brace and d/c with OP follow up.    Pt can be discharged to home with OP Neurosurgery and PCP Follow up. ED COURSE:      56 y/o male with PMHx of Hypothyroidism, Afib not on AC, YANIRA on CPAP presented to the ED for neck pain since last Friday. Patient reports that he woke up with posterior neck pain that radiated down to the shoulder blades. He denies any radiation down his spine or up over his head. No UE or LE weakness, or urinary/bowel incontinence. He had gone to the urgent care yesterday and was given local lidocaine injection and tizandine but was not helping.   Denies any fevers, chills, chest pain, SOB, abdominal pain, nausea, vomiting, diarrhea, constipation.    In the ED, VS noted for T 37, HR 70, /88, RR 19, SpO2 98% on RA. Labs noted for WBC 8. CT cervical spine shows large midline osteophyte with compression of right side of spinal cord @ C2-C3. MRI recommended. Evaluated by Neurosurgery no intervention. MRI c-spine attempted but unable to tolerated. Admitted for MRI with sedation.       Hospital Course:    Pt admitted for MRI C- spine, received 4mg IV ativan and underwent C-Spine MRI Which shows: < from: MR Cervical Spine No Cont (01.11.23 @ 13:15) >    Right paracentral disc osteophyte complex at C2-C3 causing mass effect upon the spinal cord without abnormal spinal cord signal.    Multilevel moderate to severe spinal canal stenosis on a congenital basis in concert with degenerative changes.      Neurosurgery follow up recommended Neck brace and d/c with OP follow up.  Patient was seen by pain management and was discharged with their recommendations.     Pt can be discharged to home with OP Neurosurgery and PCP Follow up.

## 2023-01-11 NOTE — DISCHARGE NOTE PROVIDER - NSDCMRMEDTOKEN_GEN_ALL_CORE_FT
acetaminophen 325 mg oral tablet: 3 tab(s) orally every 8 hours  Cardizem 120 mg oral tablet: 1 tab(s) orally once a day  diclofenac sodium 50 mg oral delayed release tablet: 1 tab(s) orally every 8 hours  lidocaine 4% topical film: Apply topically to affected area once a day   methocarbamol 500 mg oral tablet: 2 tab(s) orally every 8 hours  pantoprazole 40 mg oral delayed release tablet: 1 tab(s) orally once a day (before a meal)  polyethylene glycol 3350 oral powder for reconstitution: 17 gram(s) orally once a day  senna leaf extract oral tablet: 2 tab(s) orally once a day (at bedtime)  Synthroid 50 mcg (0.05 mg) oral tablet: 1 tab(s) orally once a day   acetaminophen 325 mg oral tablet: 3 tab(s) orally every 8 hours  Cardizem 120 mg oral tablet: 1 tab(s) orally once a day  diclofenac sodium 50 mg oral delayed release tablet: 1 tab(s) orally every 8 hours  lidocaine 4% topical film: Apply topically to affected area once a day   methocarbamol 500 mg oral tablet: 2 tab(s) orally every 8 hours  oxyCODONE 15 mg oral tablet: 1 tab(s) orally every 6 hours, As Needed -for severe pain MDD:4 tablets   pantoprazole 40 mg oral delayed release tablet: 1 tab(s) orally once a day (before a meal)  polyethylene glycol 3350 oral powder for reconstitution: 17 gram(s) orally once a day  senna leaf extract oral tablet: 2 tab(s) orally once a day (at bedtime)  Synthroid 50 mcg (0.05 mg) oral tablet: 1 tab(s) orally once a day

## 2023-01-11 NOTE — CONSULT NOTE ADULT - ASSESSMENT
Patient is a 56 y/o man with history of obesity, Afib on ATC, hypothyroidism, YANIRA, and nephrolithiasis who was admitted on 1/10/2023 with pain in the posterior neck.

## 2023-01-15 DIAGNOSIS — Z99.89 DEPENDENCE ON OTHER ENABLING MACHINES AND DEVICES: ICD-10-CM

## 2023-01-15 DIAGNOSIS — I10 ESSENTIAL (PRIMARY) HYPERTENSION: ICD-10-CM

## 2023-01-15 DIAGNOSIS — Z88.5 ALLERGY STATUS TO NARCOTIC AGENT: ICD-10-CM

## 2023-01-15 DIAGNOSIS — G95.20 UNSPECIFIED CORD COMPRESSION: ICD-10-CM

## 2023-01-15 DIAGNOSIS — F17.210 NICOTINE DEPENDENCE, CIGARETTES, UNCOMPLICATED: ICD-10-CM

## 2023-01-15 DIAGNOSIS — E03.9 HYPOTHYROIDISM, UNSPECIFIED: ICD-10-CM

## 2023-01-15 DIAGNOSIS — Z79.890 HORMONE REPLACEMENT THERAPY: ICD-10-CM

## 2023-01-15 DIAGNOSIS — G47.33 OBSTRUCTIVE SLEEP APNEA (ADULT) (PEDIATRIC): ICD-10-CM

## 2023-01-15 DIAGNOSIS — K75.81 NONALCOHOLIC STEATOHEPATITIS (NASH): ICD-10-CM

## 2023-01-15 DIAGNOSIS — I48.20 CHRONIC ATRIAL FIBRILLATION, UNSPECIFIED: ICD-10-CM

## 2023-01-15 DIAGNOSIS — Z79.82 LONG TERM (CURRENT) USE OF ASPIRIN: ICD-10-CM

## 2023-01-15 DIAGNOSIS — G95.29 OTHER CORD COMPRESSION: ICD-10-CM

## 2023-02-01 ENCOUNTER — APPOINTMENT (OUTPATIENT)
Age: 58
End: 2023-02-01
Payer: COMMERCIAL

## 2023-02-01 VITALS — BODY MASS INDEX: 39.96 KG/M2 | HEIGHT: 72 IN | WEIGHT: 295 LBS

## 2023-02-01 DIAGNOSIS — F17.200 NICOTINE DEPENDENCE, UNSPECIFIED, UNCOMPLICATED: ICD-10-CM

## 2023-02-01 DIAGNOSIS — Z82.3 FAMILY HISTORY OF STROKE: ICD-10-CM

## 2023-02-01 DIAGNOSIS — Z82.49 FAMILY HISTORY OF ISCHEMIC HEART DISEASE AND OTHER DISEASES OF THE CIRCULATORY SYSTEM: ICD-10-CM

## 2023-02-01 DIAGNOSIS — Z86.39 PERSONAL HISTORY OF OTHER ENDOCRINE, NUTRITIONAL AND METABOLIC DISEASE: ICD-10-CM

## 2023-02-01 DIAGNOSIS — Z83.3 FAMILY HISTORY OF DIABETES MELLITUS: ICD-10-CM

## 2023-02-01 DIAGNOSIS — Z86.69 PERSONAL HISTORY OF OTHER DISEASES OF THE NERVOUS SYSTEM AND SENSE ORGANS: ICD-10-CM

## 2023-02-01 PROCEDURE — 99205 OFFICE O/P NEW HI 60 MIN: CPT

## 2023-02-01 RX ORDER — DILTIAZEM HYDROCHLORIDE 120 MG/1
120 TABLET, COATED ORAL
Refills: 0 | Status: ACTIVE | COMMUNITY

## 2023-02-01 RX ORDER — LEVOTHYROXINE SODIUM 50 UG/1
50 TABLET ORAL
Refills: 0 | Status: ACTIVE | COMMUNITY

## 2023-02-02 NOTE — HISTORY OF PRESENT ILLNESS
[de-identified] : This is a 57-year-old gentleman who presents for neurosurgical consultation with regards to neck pain.  Symptoms appeared approximately 3 weeks ago with sudden onset.  Severe discomfort noted within the base of the cervical region with radiating pain features into the bilateral shoulders.  Occasional numbness, tingling, sharp pains noted into the entirety of the upper extremities including the hands.  Symptoms have intensified over the previous 3 weeks necessitating an emergency room transfer.  MRI and CAT scan of the cervical spine were completed during his hospitalization.  There is evidence of an osteophyte complex at C2-3 which appears to result in neuroforaminal and spinal canal narrowing.  Evidence of mass-effect upon the cord noted but no evidence of myelomalacia present.\par \par Patient denies any causative trauma or history of accident.  He denies neck pain in the past and reports that symptoms are quite sudden in onset.  He cannot lay supine and often has to rest in a recliner due to the extent of his discomfort.  He finds that letting his arms hanging intensify his pain.  He notes difficulty attending work and has called out several times since his symptoms began.  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  Urgent care evaluation provided trigger point injections which provided no benefit.  Muscle relaxant also provides mild symptom control.\par \par PHYSICAL EXAM: \par \par Constitutional: Well appearing, no distress\par HEENT: Normocephalic Atraumatic\par Psychiatric: Alert and oriented to all spheres, normal mood\par Pulmonary: No respiratory distress\par \par Neurologic: \par CN II-XII grossly intact\par Palpation: (+) cervical paravertebral tenderness to palpation.\par Strength:  strength reduced, L>R 4/5 Otherwise strength mostly intact without gross discrepancy\par Sensation: Full sensation to light touch in all extremities\par Reflexes: \par  2+ biceps\par  2+ triceps\par \par  No Castillo's\par  No clonus\par \par ROM limited in all directions\par \par Gait: steady, walking w/o assistance.\par

## 2023-02-02 NOTE — ASSESSMENT
[FreeTextEntry1] : 56yo male presents for neurosurgical consultation with regards to cervical osteophyte complex, OPLL, resulting in severe cervical pain with associated radicular features into the bilateral shoulders.  Patient has attempted NSAID use with no benefit, bracing to the cervical region has been attempted over the previous 1 to 2 weeks also with minimal benefit noted.\par \par We have outlined that he no longer has to wear the cervical collar.  He is a possible candidate for a posterior fusion at C2-4 although he remains hesitant to consider.  He would rather exhaust all other efforts before proceeding with surgery.  We will provide him with a referral to pain management, Dr. Hillman, to consider a cervical epidural to reduce the intensity of his pain.\par \par Will revisit with him in 2 to 4 weeks to outline his response to interventional efforts and to devise a continued treatment plan.\par \par Valium issued at 5 mg p.o. 1 tablet nightly, 30 tabs for 30 days\par Gabapentin 300 mg 1 capsule p.o. 3 times daily 90 caps for 30 days.\par \par Jessica Raphael PA-C\par Morelia Winston MD\par

## 2023-02-08 ENCOUNTER — APPOINTMENT (OUTPATIENT)
Dept: PAIN MANAGEMENT | Facility: CLINIC | Age: 58
End: 2023-02-08
Payer: COMMERCIAL

## 2023-02-08 PROCEDURE — 99214 OFFICE O/P EST MOD 30 MIN: CPT | Mod: 25

## 2023-02-08 PROCEDURE — 20552 NJX 1/MLT TRIGGER POINT 1/2: CPT

## 2023-02-08 PROCEDURE — 76942 ECHO GUIDE FOR BIOPSY: CPT

## 2023-02-08 NOTE — HISTORY OF PRESENT ILLNESS
[FreeTextEntry1] : Patient is a 58 y/o man with history of obesity, Afib, hypothyroidism, YANIRA, and nephrolithiasis who was seen in the hospital when he was admitted from 1/10/2023 - 1/11/2023 with pain in the posterior neck. The patient states that he started to develop pain while sitting on 1/6/2023. The patient states that the pain has not improved significantly since his recent admission. At this point, the patient endorses an aching, throbbing pain at the base of the posterior cervical spine that radiates to the suprascapular region bilaterally. He also endorses radiation of the pain to the left posterior arm and dorsal forearm that radiates to the 2nd-5th digits of the hand. He endorses having transient numbness over this distribution and occasional weakness with  strength. No focal weakness, ataxia, bowel or bladder incontinence, or saddle anesthesia. The patient states that pain is worse with cervical rotation bilaterally. He denies any history of chronic opioid therapy. \par

## 2023-02-08 NOTE — PROCEDURE
[Trigger point 1-2 muscle groups] : trigger point 1-2 muscle groups [FreeTextEntry1] : Trigger point injection over bilateral trapezius [FreeTextEntry2] : Myofascial neck pain [FreeTextEntry3] : Procedure Date: 2/8/2023\par \par Preoperative Diagnosis: myofascial pain\par \par Procedure: trigger point injection of the bilateral trapezius muscles\par \par Anesthesia: none\par \par Complications: none\par \par EBL: none\par \par Procedure in detail:\par Patient was seen and examined. Risks, benefits, and alternatives for the procedure were discussed with the patient in detail. Tender points over the bilateral trapezius muscles were noted. The skin was prepped with alcohol pads. A 25 gauge, 1.5 inch needle was inserted into the tender points and 8ml of 1% lidocaine was injected. The needle was withdrawn and pressure was held with no apparent bleeding. A band aid was placed over the injection site. Patient tolerated the procedure well. The patient recovered uneventfully and was discharged home in stable condition.\par

## 2023-02-08 NOTE — PHYSICAL EXAM
[de-identified] : Gen: NAD\par Head: NC/AT\par Neck: +TTP over the low cervical region and along the paraspinal muscles of the upper thoracic and low cervical region; limited rotation bilaterally 2/2 pain; +Spurlings on the left\par Eyes: no glasses, no scleral icterus\par ENT: patient wearing a mask\par CV: RRR, S1 S2, no mrg\par Lungs: CTAB, nonlabored breathing\par Abd: soft, NT/ND\par Ext: full ROM in all extremities, no peripheral edema\par Neuro: CN intact\par UEs\par +5 L +5 R shoulder abduction\par +5 L +5 R arm abduction\par +5 L +5 R forearm flexion\par +5 L +5 R forearm extension\par +5 L +5 R finger flexion\par +5 L +5 R  strength\par LEs\par +5 L +5 R hip flexion\par +5 L +5 R leg extension\par +5 L +5 R leg flexion\par +5 L +5 R foot dorsiflexion\par +5 L +5 R foot plantarflexion\par +5 L +5 R EHL extension\par Psych: normal affect\par Skin: no visible lesions\par

## 2023-02-08 NOTE — REASON FOR VISIT
[Follow-Up Visit] : a follow-up pain management visit [FreeTextEntry2] : consultation for injection  As per Tinetti test, pt is a low risk for falls.

## 2023-02-08 NOTE — DATA REVIEWED
[MRI] : MRI [Cervical Spine] : cervical spine [Report was reviewed and noted in the chart] : The report was reviewed and noted in the chart [I independently reviewed and interpreted images and report] : I independently reviewed and interpreted images and report [FreeTextEntry1] : MRI Cervical Spine (1/11/2023)\par FINDINGS:\par Examination is limited due to motion.\par \par The normal cervical lordosis is preserved.\par \par Structures at the craniocervical and cervicomedullary junction are intact.\par \par The cervical vertebral body heights and alignment are maintained. The \par bone marrow signal is within normal limits without edema. No destructive \par bony lesion.\par \par The spinal cord is unremarkable. There is high-grade spinal stenosis at \par C2-C3 with mass effect upon the spinal cord in the right paracentral \par region without abnormal spinal cord signal.\par \par The cervical intervertebral disc heights and signal intensity are \par preserved.\par \par C2-C3: There is prominent right paracentral disc osteophyte complex \par resulting in severe spinal stenosis with mass effect upon the spinal \par cord. There is no abnormal spinal cord signal.\par \par C3-C4: Disc osteophyte complex as well as bilateral uncinate spurring and \par facet arthrosis contributing to severe spinal stenosis as well as mild \par bilateral neuroforaminal narrowing\par \par C4-C5: There is disc bulging as well as bilateral facet hypertrophy and \par facet arthrosis contributing to moderate spinal stenosis.\par \par C5-C6: There is disc bulging as well as bilateral uncinate hypertrophy \par resulting in mild bilateral neuroforaminal narrowing and mild spinal \par stenosis.\par \par C6-C7: There is disc osteophyte complex, bilateral uncinate hypertrophy \par without spinal canal or neuroforaminal narrowing.\par \par C7-T1: There is disc osteophyte complex with bilateral uncinate \par hypertrophy and facet arthrosis contributing to mild bilateral \par neuroforaminal narrowing.\par \par T1-T2: Partially visualized disc osteophyte complex resulting in moderate \par to severe spinal stenosis.\par \par There is no soft tissue neck mass or fluid collection.\par \par IMPRESSION:\par Examination is limited due to motion.\par \par Right paracentral disc osteophyte complex at C2-C3 causing mass effect \par upon the spinal cord without abnormal spinal cord signal.\par \par Multilevel moderate to severe spinal canal stenosis on a congenital basis \par in concert with degenerative changes.

## 2023-02-08 NOTE — DISCUSSION/SUMMARY
[de-identified] : Patient is a 56 y/o man with history of obesity, Afib, hypothyroidism, YANIRA, and nephrolithiasis who was seen in the hospital when he was admitted from 1/10/2023 - 1/11/2023 with pain in the posterior neck.\par \par Treatments: +trigger point injection (no relief), methocarbamol, gabapentin, diclofenac, oxycodone IR 15mg without significant relief\par \par Plan:\par 1) TPI in the office today\par 2) Schedule WILIAM under MAC\par 3) Refill diclofenac 50mg TID prn\par 4) Refill methocarbamol 1000mg TID prn\par 5) Increase gabapentin to 600mg TID\par 6) Patient endorses AEs with oxycodone 15mg and feels that it does not help with his pain; I advised the patient that I do not recommend him starting chronic opioid therapy \par 7) May certify patient for medical cannabis in the future (portal is down today)\par 8) RTC 4 weeks post procedure

## 2023-02-15 ENCOUNTER — APPOINTMENT (OUTPATIENT)
Dept: PAIN MANAGEMENT | Facility: CLINIC | Age: 58
End: 2023-02-15
Payer: COMMERCIAL

## 2023-02-15 PROCEDURE — 93770 DETERMINATION VENOUS PRESS: CPT | Mod: 59

## 2023-02-15 PROCEDURE — 93040 RHYTHM ECG WITH REPORT: CPT | Mod: 79

## 2023-02-15 PROCEDURE — 62321 NJX INTERLAMINAR CRV/THRC: CPT

## 2023-02-15 PROCEDURE — 94761 N-INVAS EAR/PLS OXIMETRY MLT: CPT | Mod: 59

## 2023-02-15 NOTE — PROCEDURE
[FreeTextEntry1] : cervical epidural steroid injection [FreeTextEntry2] : cervical radiculitis [FreeTextEntry3] : Procedure Date: 2/15/2023\par \par Preoperative Diagnosis: cervical radiculitis\par \par Procedure: C7-T1 epidural steroid injection under fluoroscopic guidance\par \par Anesthesia: MAC\par \par Complications: none\par \par EBL: none\par \par Procedure in detail:\par Patient was seen and examined. Risks, benefits, and alternatives for the procedure were discussed with the patient in detail. The patient expressed understanding, gave written and verbal consent, and placed themselves in a prone position on the procedure table. Skin overlying the posterior cervical spine was prepped with chloraprep and draped in the usual sterile fashion. Fluoroscopic images were obtained to identify the C7 and T1 vertebral body. Target was the interlaminar space between the C7 and T1 vertebral body. Skin overlying the target was marked and infiltrated with 1% lidocaine. Using an 20 gauge gauge, 4.5 inch Tuohy needle, this was inserted and advanced under intermittent fluoroscopic guidance. When felt to be engaged in the ligamentum flavum, contralateral oblique view was used to confirm depth. Needle then advanced using loss of resistance to saline technique until loss was achieved. After negative aspiration for heme/csf, contrast was injected under live fluoroscopy, which showed contrast spread consistent with epidural flow. No evidence of intravascular or intrathecal uptake. At this point, a total of 2ml of injectate, which consisted of 2ml of 10mg/ml dexamethasone was injected. The needle was restyletted and withdrawn, a band aid was placed over the injection site. Patient tolerated the procedure well. The patient recovered uneventfully and was discharged home in stable condition.\par

## 2023-03-01 ENCOUNTER — APPOINTMENT (OUTPATIENT)
Dept: NEUROSURGERY | Facility: CLINIC | Age: 58
End: 2023-03-01
Payer: COMMERCIAL

## 2023-03-01 VITALS — HEIGHT: 72 IN | BODY MASS INDEX: 39.28 KG/M2 | WEIGHT: 290 LBS

## 2023-03-01 PROCEDURE — 99214 OFFICE O/P EST MOD 30 MIN: CPT

## 2023-03-02 NOTE — HISTORY OF PRESENT ILLNESS
[FreeTextEntry1] : Mr. LOBO continues to experience persistent neck pain with associated radiculopathy.  He reports numbness and paresthesias in the upper extremities with range of motion of the cervical spine.  Since his last visit he consulted with pain management (Dr. Hillman). He had TPIs without improvement, followed by a WILIAM. This provided short term relief for 5 days, then the pain returned. He is medically managed on Diclofenac, Gabapentin, and Robaxin. He does report that the Valium was working better for him then the Robaxin. \par \par - MRI / CT cervical spine performed at  demonstrated cervical stenosis with at C2-3 and C3-4 with OPLL and cord compression however no cord signal changes.\par \par \par PHYSICAL EXAM: \par Constitutional: Well appearing, no distress\par HEENT: Normocephalic Atraumatic\par Psychiatric: Alert and oriented to all spheres, normal mood\par Pulmonary: No respiratory distress\par Neurologic: \par CN II-XII grossly intact\par Palpation: (+) cervical paravertebral tenderness to palpation.\par Strength:  strength reduced, L>R 4/5 Otherwise strength mostly intact without gross discrepancy\par Sensation: Full sensation to light touch in all extremities\par Reflexes: \par  2+ biceps\par  2+ triceps\par  No Castillo's\par  No clonus\par ROM limited in all directions\par Gait: steady, walking w/o assistance.\par

## 2023-03-02 NOTE — ASSESSMENT
[FreeTextEntry1] : We have had a thorough discussion regarding his current condition, findings, and treatment options. Mr. LOBO continues to experience neck pain with associated radiculopathy and paresthesias.  He would like to proceed with a second cervical epidural injection with Dr. Hillman.  He will remain on his current medication regimen however he has requested to discontinue Robaxin and refill Valium.  I will contact Dr. Hillman regarding this request.  We did discuss surgical intervention if a second cervical epidural does not help him.  He is a candidate for a C2-3 C3-4 posterior decompression and fusion.  I will see him back in 3 weeks for reassessment.  He will call barring any issues.\par \par Marlena Caldwell MS PA-C\par Morelia Winston MD \par \par \par

## 2023-03-16 ENCOUNTER — APPOINTMENT (OUTPATIENT)
Dept: PAIN MANAGEMENT | Facility: CLINIC | Age: 58
End: 2023-03-16
Payer: COMMERCIAL

## 2023-03-16 VITALS
HEART RATE: 73 BPM | DIASTOLIC BLOOD PRESSURE: 99 MMHG | HEIGHT: 72 IN | BODY MASS INDEX: 39.28 KG/M2 | WEIGHT: 290 LBS | SYSTOLIC BLOOD PRESSURE: 167 MMHG

## 2023-03-16 PROCEDURE — 99214 OFFICE O/P EST MOD 30 MIN: CPT

## 2023-03-16 RX ORDER — METHOCARBAMOL 500 MG/1
500 TABLET, FILM COATED ORAL 3 TIMES DAILY
Qty: 180 | Refills: 1 | Status: ACTIVE | COMMUNITY
Start: 2023-02-08 | End: 1900-01-01

## 2023-03-16 RX ORDER — GABAPENTIN 600 MG/1
600 TABLET, COATED ORAL 3 TIMES DAILY
Qty: 270 | Refills: 1 | Status: ACTIVE | COMMUNITY
Start: 2023-03-16 | End: 1900-01-01

## 2023-03-16 RX ORDER — DIAZEPAM 5 MG/1
5 TABLET ORAL
Qty: 30 | Refills: 0 | Status: DISCONTINUED | COMMUNITY
Start: 2023-02-01 | End: 2023-03-16

## 2023-03-16 RX ORDER — GABAPENTIN 300 MG/1
300 CAPSULE ORAL 3 TIMES DAILY
Qty: 90 | Refills: 0 | Status: DISCONTINUED | COMMUNITY
Start: 2023-02-01 | End: 2023-03-16

## 2023-03-16 RX ORDER — DICLOFENAC POTASSIUM 50 MG/1
50 TABLET, COATED ORAL
Qty: 90 | Refills: 1 | Status: ACTIVE | COMMUNITY
Start: 2023-02-08 | End: 1900-01-01

## 2023-03-16 NOTE — HISTORY OF PRESENT ILLNESS
[FreeTextEntry1] : Patient is a 57 y/o man presenting for a RPV for a history of chronic axial and radicular neck pain. Since his last visit, the patient underwent a WILIAM on 2/15/2023. He reports that he had complete (100%) relief for one week after the procedure and states that he continued to have some relief over the rest of the month. He states that he did not have to use any medication for pain for two weeks after the procedure and was able to sleep without difficulties. However, he notes that he has had some steady return of pain over the past few weeks. The patient still feels that pain overall is improved since prior to the procedure. He describes an aching pain in the left posterior neck that radiates to the left posterior arm and dorsal forearm into the 3rd-5th digits of the left hand. He has intermittent numbness and tingling over this distribution. No focal weakness, bowel or bladder incontinence or saddle anesthesia.

## 2023-03-16 NOTE — PHYSICAL EXAM
[de-identified] : Gen: NAD, patient is obese\par Head: NC/AT\par Eyes: no glasses, no scleral icterus\par ENT: patient wearing a mask\par Neck: limited rotation to the left and extension 2/2 pain; +tenderness at the left low cervical facet region and throughout the muscles of the low cervical and upper thoracic region; +spurling's on the left\par CV: RRR, S1 S2, no mrg\par Lungs: CTAB, nonlabored breathing\par Abd: soft, NT/ND\par Ext: full ROM in all extremities, no peripheral edema\par Neuro: CN intact\par UEs\par +5 L +5 R shoulder abduction\par +5 L +5 R arm abduction\par +5 L +5 R forearm flexion\par +5 L +5 R forearm extension\par +5 L +5 R finger flexion\par +5 L +5 R  strength\par LEs\par +5 L +5 R hip flexion\par +5 L +5 R leg extension\par +5 L +5 R leg flexion\par +5 L +5 R foot dorsiflexion\par +5 L +5 R foot plantarflexion\par +5 L +5 R EHL extension\par Psych: normal affect\par Skin: no visible lesions\par

## 2023-03-16 NOTE — DISCUSSION/SUMMARY
[de-identified] : Patient is a 57 y/o man presenting for a RPV for a history of chronic axial and radicular neck pain. Since his last visit, the patient underwent a WILIAM on 2/15/2023.\par \par Prior treatment: +physical therapy, WILIAM on 2/15/2023 provided 100% relief for 1 week then 50-60% relief for next 3 weeks (ongoing); gabapentin, diclofenac, methocarbamol with moderate relief\par \par Plan:\par 1) Scheduled repeat WILIAM\par 2) Patient requested diazepam to help with sleep; discussed that I do not believe this is a good long term medication and offered alternatives for sleep. He understood and requested #10 tabs to be taken on rare occasions.\par 3) Diazepam 5mg #10 tabs; WILL NOT PRESCRIBE MORE\par 4) Refill gabapentin 600mg TID\par 5) Refill methocarbamol 1000mg BID prn\par 6) Refill diclofenac 50mg BID prn\par 7) Continue home exercise regimen\par 8) RTC post procedure

## 2023-03-22 ENCOUNTER — APPOINTMENT (OUTPATIENT)
Dept: NEUROSURGERY | Facility: CLINIC | Age: 58
End: 2023-03-22

## 2023-04-05 ENCOUNTER — APPOINTMENT (OUTPATIENT)
Dept: PAIN MANAGEMENT | Facility: CLINIC | Age: 58
End: 2023-04-05
Payer: COMMERCIAL

## 2023-04-05 ENCOUNTER — APPOINTMENT (OUTPATIENT)
Dept: PAIN MANAGEMENT | Facility: CLINIC | Age: 58
End: 2023-04-05

## 2023-04-05 PROCEDURE — 62321 NJX INTERLAMINAR CRV/THRC: CPT

## 2023-04-05 PROCEDURE — 93040 RHYTHM ECG WITH REPORT: CPT | Mod: 79

## 2023-04-05 PROCEDURE — 00600 ANES PX CRV SPINE&CORD NOS: CPT | Mod: QZ,P3

## 2023-04-05 PROCEDURE — 93770 DETERMINATION VENOUS PRESS: CPT

## 2023-04-05 PROCEDURE — 94761 N-INVAS EAR/PLS OXIMETRY MLT: CPT

## 2023-04-05 NOTE — PROCEDURE
[FreeTextEntry1] : cervical interlaminar epidural steroid injection [FreeTextEntry2] : cervical radiculopathy [FreeTextEntry3] : Procedure Date: 4/5/2023\par \par Preoperative Diagnosis: cervical radiculopathy\par \par Procedure: C7-T1 epidural steroid injection under fluoroscopic guidance\par \par Anesthesia: MAC\par \par Complications: none\par \par EBL: none\par \par Procedure in detail:\par Patient was seen and examined. Risks, benefits, and alternatives for the procedure were discussed with the patient in detail. The patient expressed understanding, gave written and verbal consent, and placed themselves in a prone position on the procedure table. Skin overlying the posterior cervical spine was prepped with chloraprep and draped in the usual sterile fashion. Fluoroscopic images were obtained to identify the C7 and T1 vertebral body. Target was the interlaminar space between the C7 and T1 vertebral body. Skin overlying the target was marked and infiltrated with 1% lidocaine. Using an 20 gauge, 4.5 inch tuohy needle, this was inserted and advanced under intermittent fluoroscopic guidance. When felt to be engaged in the ligamentum flavum, contralateral oblique view was used to confirm depth. Needle then advanced using loss of resistance to saline technique until loss was achieved. After negative aspiration for heme/csf, contrast was injected under live fluoroscopy, which showed contrast spread consistent with epidural flow. No evidence of intravascular or intrathecal uptake. At this point, a total of 2ml of injectate, which consisted of 2ml of 10mg/ml dexamethasone was injected. The needle was restyletted and withdrawn, a band aid was placed over the injection site. Patient tolerated the procedure well. The patient recovered uneventfully and was discharged home in stable condition.

## 2023-05-03 ENCOUNTER — APPOINTMENT (OUTPATIENT)
Dept: PAIN MANAGEMENT | Facility: CLINIC | Age: 58
End: 2023-05-03
Payer: COMMERCIAL

## 2023-05-03 PROCEDURE — 99214 OFFICE O/P EST MOD 30 MIN: CPT | Mod: 95

## 2023-05-03 NOTE — PHYSICAL EXAM
[de-identified] : Gen: NAD, patient is obese\par HEENT: NC/AT, no scleral icterus\par CV: no JVD\par Resp: nonlabored breathing\par Abd: nondistended\par Ext: full ROM\par Neuro: CN intact\par Psych: normal affect\par

## 2023-05-03 NOTE — HISTORY OF PRESENT ILLNESS
[FreeTextEntry1] : Patient is a 57 y/o man presenting for a RPV for a history of chronic neck pain with radicular features. The patient underwent a WILIAM on 4/5/2023, which he feels has helped with his pain by ~60-70% over the past month. He states that he has had to miss fewer days of work because of the improvement in pain and feels that he is able to be more active at work as well. The patient continues to take gabapentin 600mg TID, methocarbamol 1000mg BID prn, and diclofenac 50mg BID. He notes that methocarbamol sometimes makes him feel sleepy. The patient states that he is having some pain in the posterior neck that radiates somewhat to the left posterior arm and dorsal forearm into the 3rd-5th digits of the hand. He notes that the numbness has largely improved.

## 2023-05-03 NOTE — DISCUSSION/SUMMARY
[de-identified] : Patient is a 57 y/o man presenting for a RPV for a history of chronic axial and radicular neck pain. \par \par Prior treatment: \par +physical therapy\par 4/5/2023: WILIAM provided 60-70% relief for 1 month (ongoing)\par 2/15/2023: WILIAM provided 100% relief for 1 week then 50-60% relief for next 5 weeks\par +gabapentin, diclofenac, methocarbamol with moderate relief\par \par Plan:\par 1) Scheduled repeat WILIAM w/ MAC in 2 weeks \par 2) Continue gabapentin 600mg TID\par 3) Continue methocarbamol 1000mg BID prn\par 4) Continue diclofenac 50mg BID prn\par 5) Continue home exercise regimen\par 6) RTC post procedure. \par

## 2023-05-03 NOTE — DATA REVIEWED
[FreeTextEntry1] : MRI Cervical Spine (1/11/2023)\par FINDINGS:\par Examination is limited due to motion.\par \par The normal cervical lordosis is preserved.\par \par Structures at the craniocervical and cervicomedullary junction are intact.\par \par The cervical vertebral body heights and alignment are maintained. The \par bone marrow signal is within normal limits without edema. No destructive \par bony lesion.\par \par The spinal cord is unremarkable. There is high-grade spinal stenosis at \par C2-C3 with mass effect upon the spinal cord in the right paracentral \par region without abnormal spinal cord signal.\par \par The cervical intervertebral disc heights and signal intensity are \par preserved.\par \par C2-C3: There is prominent right paracentral disc osteophyte complex \par resulting in severe spinal stenosis with mass effect upon the spinal \par cord. There is no abnormal spinal cord signal.\par \par C3-C4: Disc osteophyte complex as well as bilateral uncinate spurring and \par facet arthrosis contributing to severe spinal stenosis as well as mild \par bilateral neuroforaminal narrowing\par \par C4-C5: There is disc bulging as well as bilateral facet hypertrophy and \par facet arthrosis contributing to moderate spinal stenosis.\par \par C5-C6: There is disc bulging as well as bilateral uncinate hypertrophy \par resulting in mild bilateral neuroforaminal narrowing and mild spinal \par stenosis.\par \par C6-C7: There is disc osteophyte complex, bilateral uncinate hypertrophy \par without spinal canal or neuroforaminal narrowing.\par \par C7-T1: There is disc osteophyte complex with bilateral uncinate \par hypertrophy and facet arthrosis contributing to mild bilateral \par neuroforaminal narrowing.\par \par T1-T2: Partially visualized disc osteophyte complex resulting in moderate \par to severe spinal stenosis.\par \par There is no soft tissue neck mass or fluid collection.\par \par IMPRESSION:\par Examination is limited due to motion.\par \par Right paracentral disc osteophyte complex at C2-C3 causing mass effect \par upon the spinal cord without abnormal spinal cord signal.\par \par Multilevel moderate to severe spinal canal stenosis on a congenital basis \par in concert with degenerative changes. 
None

## 2023-05-11 ENCOUNTER — APPOINTMENT (OUTPATIENT)
Age: 58
End: 2023-05-11

## 2023-05-19 ENCOUNTER — APPOINTMENT (OUTPATIENT)
Dept: PAIN MANAGEMENT | Facility: CLINIC | Age: 58
End: 2023-05-19
Payer: COMMERCIAL

## 2023-05-19 PROCEDURE — 93040 RHYTHM ECG WITH REPORT: CPT | Mod: 79

## 2023-05-19 PROCEDURE — 93770 DETERMINATION VENOUS PRESS: CPT

## 2023-05-19 PROCEDURE — 94761 N-INVAS EAR/PLS OXIMETRY MLT: CPT

## 2023-05-19 PROCEDURE — 62321 NJX INTERLAMINAR CRV/THRC: CPT

## 2023-05-19 NOTE — PROCEDURE
[FreeTextEntry1] : cervical interlaminar epidural steroid injection [FreeTextEntry2] : cervical radiculopathy [FreeTextEntry3] : Procedure Date: 5/19/2023\par \par Preoperative Diagnosis: cervical radiculopathy\par \par Procedure: C7-T1 epidural steroid injection under fluoroscopic guidance\par \par Anesthesia: MAC\par \par Complications: none\par \par EBL: none\par \par Procedure in detail:\par Patient was seen and examined. Risks, benefits, and alternatives for the procedure were discussed with the patient in detail. The patient expressed understanding, gave written and verbal consent, and placed themselves in a prone position on the procedure table. Skin overlying the posterior cervical spine was prepped with chloraprep and draped in the usual sterile fashion. Fluoroscopic images were obtained to identify the C7 and T1 vertebral body. Target was the interlaminar space between the C7 and T1 vertebral body. Skin overlying the target was marked and infiltrated with 1% lidocaine. Using an 20 gauge, 4.5 inch tuohy needle, this was inserted and advanced under intermittent fluoroscopic guidance. When felt to be engaged in the ligamentum flavum, contralateral oblique view was used to confirm depth. Needle then advanced using loss of resistance to saline technique until loss was achieved. After negative aspiration for heme/csf, contrast was injected under live fluoroscopy, which showed contrast spread consistent with epidural flow. No evidence of intravascular or intrathecal uptake. At this point, a total of 2ml of 10mg/ml dexamethasone was injected. The needle was restyletted and withdrawn, a band aid was placed over the injection site. Patient tolerated the procedure well. The patient recovered uneventfully and was discharged home in stable condition.\par

## 2023-06-14 ENCOUNTER — APPOINTMENT (OUTPATIENT)
Dept: PAIN MANAGEMENT | Facility: CLINIC | Age: 58
End: 2023-06-14

## 2023-08-08 NOTE — H&P ADULT - NSICDXPASTMEDICALHX_GEN_ALL_CORE_FT
show PAST MEDICAL HISTORY:  Atrial fibrillation, unspecified type     Sciatica     Sleep apnea with use of continuous positive airway pressure (CPAP)

## 2023-08-17 ENCOUNTER — APPOINTMENT (OUTPATIENT)
Dept: PAIN MANAGEMENT | Facility: CLINIC | Age: 58
End: 2023-08-17

## 2023-08-23 ENCOUNTER — APPOINTMENT (OUTPATIENT)
Dept: PAIN MANAGEMENT | Facility: CLINIC | Age: 58
End: 2023-08-23
Payer: COMMERCIAL

## 2023-08-23 VITALS — BODY MASS INDEX: 39.28 KG/M2 | HEIGHT: 72 IN | WEIGHT: 290 LBS

## 2023-08-23 PROCEDURE — 99214 OFFICE O/P EST MOD 30 MIN: CPT

## 2023-08-23 RX ORDER — MELOXICAM 15 MG/1
15 TABLET ORAL DAILY
Qty: 30 | Refills: 0 | Status: ACTIVE | COMMUNITY
Start: 2023-08-23 | End: 1900-01-01

## 2023-08-23 NOTE — DISCUSSION/SUMMARY
[de-identified] : A discussion regarding available pain management treatment options occurred with the patient.  These included interventional, rehabilitative, pharmacological, and alternative modalities. We will proceed with the following:  Interventional treatment options: Patient will proceed with C7-T1 WILIAM w mac Treatment options were discussed with the patient. The patient has been having persistent neck pain with radiculopathy with minimal improvement with conservative therapies. The patient was given the option to proceed with a cervical epidural steroid injection to try to get some pain relief. He has had them before in the past and had significant relief.  If we are unable to get pain relief with this procedure we will reassess our options before proceeding.  The risks and benefits were discussed which included bleeding, infection, nerve injury, no pain relief or worse, increased pain. All questions were answered and the patient will schedule for the injection on the next available date.  Rehabilitative options: - participation in active HEP was discussed  Medication based treatment options: ordered mobic 15mg daily for 2 weeks. Discussed risks and benefits. Avoid taking for any side effects reordered to c/w diazepam   Complementary treatment options: - lifestyle modifications discussed  f/u two weeks after injection   I,Trisha Spaulding, attest that this documentation has been prepared under the direction and in the presence of Provider Giovanny Genao, DO The documentation recorded by the scribe, in my presence, accurately reflects the service I personally performed, and the decisions made by me with my edits as appropriate.  Best Regards,  Giovanny Genao D.O.

## 2023-08-23 NOTE — HISTORY OF PRESENT ILLNESS
[FreeTextEntry1] : Original HPI: Patient is a 58 y/o man with history of obesity, Afib, hypothyroidism, YANIRA, and nephrolithiasis who was seen in the hospital when he was admitted from 1/10/2023 - 1/11/2023 with pain in the posterior neck. The patient states that he started to develop pain while sitting on 1/6/2023. The patient states that the pain has not improved significantly since his recent admission. At this point, the patient endorses an aching, throbbing pain at the base of the posterior cervical spine that radiates to the suprascapular region bilaterally. He also endorses radiation of the pain to the left posterior arm and dorsal forearm that radiates to the 2nd-5th digits of the hand. He endorses having transient numbness over this distribution and occasional weakness with  strength. No focal weakness, ataxia, bowel or bladder incontinence, or saddle anesthesia. The patient states that pain is worse with cervical rotation bilaterally. He denies any history of chronic opioid therapy.  8/23/23: Patient presenting for a history of chronic neck pain with radiating pain down the bilateral upper extremities. Patient has been treated by Dr. Hillman in the past where he had a WILIAM on 4/5/23, in which he felt improvement in pain and was able to be more active at work. Patient takes gabapentin 600mg TID, methocarbamol 1000mg BID prn, and diclofenac 50mg BID to manage pain. Pt describes his neck pain is sharp, burning, tingling that radiates down the arm and is 8/10 pain intensity at it's worst. Cervical extension and lateral rotation improve the pain, cervical flexion worsens the pain. Patient also seen Dr. Winston and she recommended a C2-3 C3-4 posterior decompression and fusion if second cervical epidural did not help. NSAIDs, PT, has been tried without relief. Pt denies bowel/bladder incontinence, weakness, falls, unsteadiness.

## 2023-08-23 NOTE — DATA REVIEWED
[FreeTextEntry1] : 1/11/23 MRI LUMBAR SPINE: Examination is limited due to motion. Right paracentral disc osteophyte complex at C2-C3 causing mass effect upon the spinal cord without abnormal spinal cord signal. Multilevel moderate to severe spinal canal stenosis on a congenital basis in concert with degenerative changes.  MRI reviewed in detail at today's visit.

## 2023-08-23 NOTE — PHYSICAL EXAM
[de-identified] : Cervical Spine Exam:  Palpation:                                                    Cervical paraspinal mm tenderness:   R (-); L(-) Upper trapezius mm tenderness:        R (-); L (-)  ROM:   Full ROM   Special Testing: Spurling Test:                  R (+); L (+) Facet load test:               R (-); L (-)           pain with flexion

## 2023-09-08 ENCOUNTER — APPOINTMENT (OUTPATIENT)
Dept: PAIN MANAGEMENT | Facility: CLINIC | Age: 58
End: 2023-09-08
Payer: COMMERCIAL

## 2023-09-08 PROCEDURE — 00600 ANES PX CRV SPINE&CORD NOS: CPT | Mod: QZ,P3

## 2023-09-08 PROCEDURE — 94761 N-INVAS EAR/PLS OXIMETRY MLT: CPT | Mod: 59

## 2023-09-08 PROCEDURE — 93770 DETERMINATION VENOUS PRESS: CPT

## 2023-09-08 PROCEDURE — 62321 NJX INTERLAMINAR CRV/THRC: CPT

## 2023-09-08 NOTE — PROCEDURE
[FreeTextEntry3] : Date of Service: 09/08/2023   Account: [id number]  Patient: VALENTINA LOBO   YOB: 1965  Age: 58 year  Surgeon:      Giovanny Genao DO  Assistant:    None  Pre-Operative Diagnosis:         Cervical Radiculopathy (M54.12)  Post Operative Diagnosis:       Cervical Radiculopathy (M54.12)  Procedure:             Cervical (C7-T1) interlaminar epidural steroid injection under fluoroscopic guidance  Anesthesia:  IRMA Merrillal  This procedure was carried out using fluoroscopic guidance.  The risks and benefits of the procedure were discussed extensively with the patient.  The consent of the patient was obtained and the following procedure was performed. The patient was placed in the prone position on the fluoroscopy table and the area was prepped and draped in a sterile fashion.  A timeout was performed with all essential staff present and the site and side were verified.  The patient was placed in the prone position and optimized to patient comfort.  The cervical area was prepped and draped in a sterile fashion.  The fluoroscope visualized the C7-T1 interspace using slight cephalad-caudad angulation and this area was marked.  Using sterile technique the superficial skin was anesthetized with 1% Lidocaine.  A 20 gauge 3.5 inch Tuohy needle was advanced under fluoroscopy until ligament was engaged.  Using a contralateral oblique view, a "loss of resistance" to air technique was utilized in order to gain access to the epidural space.  After negative aspiration for heme and CSF, 1 cc of Omnipaque contrast was administered and the appropriate cervical epidurogram was obtained in the KAELYN and A/P view..  A total injectate of 3 cc of preservative free normal saline and 10mg of Dexamethasone was then injected into the epidural space while maintaining meaningful verbal contact with the patient.    The needle was subsequently removed.  Vital signs remained normal.  Pulse oximeter was used throughout the procedure and the patient's pulse and oxygen saturation remained within normal limits.  The patient tolerated the procedure well.  There were no complications.  The patient was instructed to apply ice over the injection sites for twenty minutes every two hours for the next 24 to 48 hours.  Disposition:       1. The patient was advised to F/U in 1-2 weeks to assess the response to the injection.      2. The patient was also instructed to contact me immediately if there were any concerns related to the procedure performed.    Giovanny Genao DO

## 2023-10-05 ENCOUNTER — APPOINTMENT (OUTPATIENT)
Dept: PAIN MANAGEMENT | Facility: CLINIC | Age: 58
End: 2023-10-05
Payer: COMMERCIAL

## 2023-10-05 PROCEDURE — 99214 OFFICE O/P EST MOD 30 MIN: CPT

## 2023-10-05 RX ORDER — DIAZEPAM 5 MG/1
5 TABLET ORAL
Qty: 10 | Refills: 0 | Status: ACTIVE | COMMUNITY
Start: 2023-03-16 | End: 1900-01-01

## 2023-11-09 ENCOUNTER — APPOINTMENT (OUTPATIENT)
Dept: NEUROSURGERY | Facility: CLINIC | Age: 58
End: 2023-11-09
Payer: COMMERCIAL

## 2023-11-09 VITALS — WEIGHT: 310 LBS | BODY MASS INDEX: 41.99 KG/M2 | HEIGHT: 72 IN

## 2023-11-09 PROCEDURE — 99212 OFFICE O/P EST SF 10 MIN: CPT

## 2023-11-16 ENCOUNTER — APPOINTMENT (OUTPATIENT)
Dept: PAIN MANAGEMENT | Facility: CLINIC | Age: 58
End: 2023-11-16

## 2023-12-06 ENCOUNTER — APPOINTMENT (OUTPATIENT)
Dept: NEUROSURGERY | Facility: CLINIC | Age: 58
End: 2023-12-06

## 2023-12-21 ENCOUNTER — APPOINTMENT (OUTPATIENT)
Dept: PAIN MANAGEMENT | Facility: CLINIC | Age: 58
End: 2023-12-21
Payer: COMMERCIAL

## 2023-12-21 DIAGNOSIS — M48.02 SPINAL STENOSIS, CERVICAL REGION: ICD-10-CM

## 2023-12-21 DIAGNOSIS — M54.12 RADICULOPATHY, CERVICAL REGION: ICD-10-CM

## 2023-12-21 DIAGNOSIS — M54.2 CERVICALGIA: ICD-10-CM

## 2023-12-21 PROCEDURE — 99214 OFFICE O/P EST MOD 30 MIN: CPT

## 2023-12-21 RX ORDER — DIAZEPAM 5 MG/1
5 TABLET ORAL
Qty: 5 | Refills: 0 | Status: ACTIVE | COMMUNITY
Start: 2023-12-21 | End: 1900-01-01

## 2023-12-21 NOTE — DISCUSSION/SUMMARY
[de-identified] : A discussion regarding available pain management treatment options occurred with the patient.  These included interventional, rehabilitative, pharmacological, and alternative modalities. We will proceed with the following:  Interventional treatment options: - Deferred for now.   Imaging: - Patient will be undergoing an MRI of the cervical spine.  Rehabilitative options: - participation in active HEP was discussed  Medication based treatment options: -diazePAM 5 MG TAKE 1 TABLET AT BEDTIME AS NEEDED for 5 days was ordered at today's visit - He was advised to take Meloxicam 15 mg for 1 month   - Follo up in 1 month.  I, Ivette Vega, attest that this documentation has been prepared under the direction and in the presence of Provider Giovanny Genao, DO   The documentation recorded by the scribe, in my presence, accurately reflects the service I personally performed, and the decisions made by me with my edits as appropriate.   Best Regards,  Giovanny Genao D.O.

## 2023-12-21 NOTE — PHYSICAL EXAM
[de-identified] : Cervical Spine Exam:  Palpation:                                                   ROM:   Full ROM   Special Testing: Spurling Test:                  R (+); L (+)

## 2023-12-21 NOTE — HISTORY OF PRESENT ILLNESS
[FreeTextEntry1] : 10/5/23 Pt is s/p C7-T1 WILIAM performed on 23 with 60% sustained relief. Pain in the neck continues to be dull/achy in nature. He states that the pain has been manageable since the injection. He continues to use meloxicam 15mg to manage pain. He is slowly titrating off the Gabapentin 600mg because he has been feeling better. Pain is associated with stiffness. Pt states that he utilizes diazepam 5mg only at bedtime for neck pain which is taken infrequently. Today his pain is a 7/10 on the pain scale.   TODAY, 2023: Revisit encounter.   He is presenting today with ongoing severe neck pain. He recently saw Dr. Winston since his last visit. She recommended a new MRI of the cervical spine to discuss surgical options. He does need to be sedated or take Valium before his MRI. He states the pain continues to refer into the upper extremities. He states the pain is present daily. He has undergone a series of epidural shots with only some relief. He has also undergone physical therapy with minimal relief.   He occasionally uses Valium 5 mg to help with the severe pain. He states he only takes this when the pain is severe. He states with the use of this medication, he has  in pain and less anxiety worrying about his pain. He is also able to sleep better. On a day-to-day basis, he takes Tylenol to help with the pain.

## 2024-03-13 NOTE — CDI QUERY NOTE - NSCDIOTHERTXTBX_GEN_ALL_CORE_HH
Preop note
DOCUMENTATION CLARIFICATION FORM     Encounter #: [id number]                                                         Patient’s Name: Rey Cutler  Medical Record #: [id number]                                               Admit Date: 8-  CDI Specialist/: Mike                                               Contact #: [phone]    Dear Dr. Gonsales,                      Date: 9-                  The Physician’s or Provider’s documentation of the patient’s presentation, evaluation and  medical management, as identified below, may support a diagnosis that is not documented in the medical record.  In order to accurately capture all diagnoses to the greatest degree of specificity reflecting the patient’s actual severity of illness, the documentation in this patient’s medical record requires additional clarification.  Please include more specific documentation, either known or suspected, of a corresponding diagnosis associated with the clinical information described below in your Progress Note and/or Discharge Summary.    CLINICAL INDICATORS  Documentation  •	8-31 ED Note: Pt reports awoke with bright red blood on his pillow and face. Reports stood up and experienced more episodes of bright red blood, unable to decipher to if vomiting up blood or coughing up blood. Reports began to experience epigastric pain upon ED arrival.  •	8-31 ENT Consult: Patient seen and examined in the endoscopy suite. No active current nasal, oral or pharyngeal bleeding seen on endoscopy performed at time of consultation...HEENT: Head NC/AT. Nares bilaterally patent, no blood or discharge noted. ET Tube obstructing view of oral cavity. Neck supple, trachea midline. Flexible Fiberoptic Laryngoscopy performed by myself and ENT attending at bedside, no source of bleed identified intranasally or in the posterior oropharynx; was unable to visualize cords due to ET tube protecting airway, but no blood noted around tube.  •	8-31 EGD Findings:... Nodule in the upper third of the esophagus 20 cm from incisor..... -Bleeding was noted above the vocal cords which was suctioned. No active bleeding or blood was noted in the esophagus, stomach and duodenum.... -Bleeding from nose due to nasal CPAP due to dried off nasal mucosa might have stopped.    •	9-3 DC Summary: Initial EGD did not show acute bleeding, but only showed upper 1/3rd esophageal mucosal nodule. EGD repeated to remove nodule, sent to pathology for review....DC Diagnosis Upper GI bleed  •	9-3 Medical Attending: Blood per mouth STOPPED likely 2/2 epistaxis 2/2 CPAP machine...endoscopy did not identify an active bleed, varices or ulcerations    Treatment  •	ocean NS 2 spry c nos q6, heidi HS    QUERY  Based on your clinical judgment and the above clinical indicators, please clarify if Upper GI bleed can be further specified as:  (  ) Upper GI bleed ruled out at the time of discharge  (  ) Other (please specify)_______  (  ) Unable to clinically determine      Documentation clarification is required for compliance, accuracy in coding and billing, and reporting severity of illness, quality data   and risk of mortality.  --------------------------------------------------------------------------------------------------------------------------------------------  DO NOT REMOVE THIS RECORD WITHOUT FIRST NOTIFYING THE CDI SPECIALIST  This form is NOT a part of the permanent Medical Record.

## 2024-06-04 ENCOUNTER — NON-APPOINTMENT (OUTPATIENT)
Age: 59
End: 2024-06-04

## 2024-06-26 ENCOUNTER — APPOINTMENT (OUTPATIENT)
Dept: NEUROSURGERY | Facility: CLINIC | Age: 59
End: 2024-06-26
Payer: COMMERCIAL

## 2024-06-26 VITALS — HEIGHT: 72 IN | BODY MASS INDEX: 41.99 KG/M2 | WEIGHT: 310 LBS

## 2024-06-26 DIAGNOSIS — M25.78 OSTEOPHYTE, VERTEBRAE: ICD-10-CM

## 2024-06-26 DIAGNOSIS — M48.8X2 OTHER SPECIFIED SPONDYLOPATHIES, CERVICAL REGION: ICD-10-CM

## 2024-06-26 PROCEDURE — 99212 OFFICE O/P EST SF 10 MIN: CPT

## 2024-06-28 PROBLEM — M48.8X2 OSSIFICATION OF POSTERIOR LONGITUDINAL LIGAMENT IN CERVICAL REGION: Status: ACTIVE | Noted: 2023-03-01

## 2024-06-28 PROBLEM — M25.78 CERVICAL OSTEOPHYTE: Status: ACTIVE | Noted: 2023-02-01

## 2024-07-05 ENCOUNTER — EMERGENCY (EMERGENCY)
Facility: HOSPITAL | Age: 59
LOS: 0 days | Discharge: HOME | End: 2024-07-05
Attending: EMERGENCY MEDICINE
Payer: COMMERCIAL

## 2024-07-05 VITALS
TEMPERATURE: 98 F | SYSTOLIC BLOOD PRESSURE: 179 MMHG | RESPIRATION RATE: 18 BRPM | HEIGHT: 74 IN | HEART RATE: 70 BPM | WEIGHT: 300.05 LBS | DIASTOLIC BLOOD PRESSURE: 77 MMHG

## 2024-07-05 VITALS
HEART RATE: 72 BPM | DIASTOLIC BLOOD PRESSURE: 64 MMHG | OXYGEN SATURATION: 99 % | SYSTOLIC BLOOD PRESSURE: 121 MMHG | RESPIRATION RATE: 18 BRPM | TEMPERATURE: 98 F

## 2024-07-05 DIAGNOSIS — Z99.89 DEPENDENCE ON OTHER ENABLING MACHINES AND DEVICES: ICD-10-CM

## 2024-07-05 DIAGNOSIS — Z88.5 ALLERGY STATUS TO NARCOTIC AGENT: ICD-10-CM

## 2024-07-05 DIAGNOSIS — G47.33 OBSTRUCTIVE SLEEP APNEA (ADULT) (PEDIATRIC): ICD-10-CM

## 2024-07-05 DIAGNOSIS — N43.3 HYDROCELE, UNSPECIFIED: ICD-10-CM

## 2024-07-05 DIAGNOSIS — I31.39 OTHER PERICARDIAL EFFUSION (NONINFLAMMATORY): ICD-10-CM

## 2024-07-05 DIAGNOSIS — R00.0 TACHYCARDIA, UNSPECIFIED: ICD-10-CM

## 2024-07-05 DIAGNOSIS — R10.32 LEFT LOWER QUADRANT PAIN: ICD-10-CM

## 2024-07-05 DIAGNOSIS — F17.200 NICOTINE DEPENDENCE, UNSPECIFIED, UNCOMPLICATED: ICD-10-CM

## 2024-07-05 DIAGNOSIS — N20.2 CALCULUS OF KIDNEY WITH CALCULUS OF URETER: ICD-10-CM

## 2024-07-05 DIAGNOSIS — E03.9 HYPOTHYROIDISM, UNSPECIFIED: ICD-10-CM

## 2024-07-05 DIAGNOSIS — R10.12 LEFT UPPER QUADRANT PAIN: ICD-10-CM

## 2024-07-05 DIAGNOSIS — R93.5 ABNORMAL FINDINGS ON DIAGNOSTIC IMAGING OF OTHER ABDOMINAL REGIONS, INCLUDING RETROPERITONEUM: ICD-10-CM

## 2024-07-05 DIAGNOSIS — I48.91 UNSPECIFIED ATRIAL FIBRILLATION: ICD-10-CM

## 2024-07-05 LAB
ALBUMIN SERPL ELPH-MCNC: 4.3 G/DL — SIGNIFICANT CHANGE UP (ref 3.5–5.2)
ALBUMIN SERPL ELPH-MCNC: 4.5 G/DL — SIGNIFICANT CHANGE UP (ref 3.5–5.2)
ALP SERPL-CCNC: 62 U/L — SIGNIFICANT CHANGE UP (ref 30–115)
ALP SERPL-CCNC: 75 U/L — SIGNIFICANT CHANGE UP (ref 30–115)
ALT FLD-CCNC: 40 U/L — SIGNIFICANT CHANGE UP (ref 0–41)
ALT FLD-CCNC: 43 U/L — HIGH (ref 0–41)
ANION GAP SERPL CALC-SCNC: 11 MMOL/L — SIGNIFICANT CHANGE UP (ref 7–14)
ANION GAP SERPL CALC-SCNC: 11 MMOL/L — SIGNIFICANT CHANGE UP (ref 7–14)
APPEARANCE UR: CLEAR — SIGNIFICANT CHANGE UP
AST SERPL-CCNC: 49 U/L — HIGH (ref 0–41)
AST SERPL-CCNC: 90 U/L — HIGH (ref 0–41)
BASOPHILS # BLD AUTO: 0.07 K/UL — SIGNIFICANT CHANGE UP (ref 0–0.2)
BASOPHILS NFR BLD AUTO: 0.9 % — SIGNIFICANT CHANGE UP (ref 0–1)
BILIRUB SERPL-MCNC: 1.3 MG/DL — HIGH (ref 0.2–1.2)
BILIRUB SERPL-MCNC: 1.5 MG/DL — HIGH (ref 0.2–1.2)
BILIRUB UR-MCNC: NEGATIVE — SIGNIFICANT CHANGE UP
BUN SERPL-MCNC: 11 MG/DL — SIGNIFICANT CHANGE UP (ref 10–20)
BUN SERPL-MCNC: 12 MG/DL — SIGNIFICANT CHANGE UP (ref 10–20)
CALCIUM SERPL-MCNC: 9.1 MG/DL — SIGNIFICANT CHANGE UP (ref 8.4–10.4)
CALCIUM SERPL-MCNC: 9.2 MG/DL — SIGNIFICANT CHANGE UP (ref 8.4–10.5)
CHLORIDE SERPL-SCNC: 101 MMOL/L — SIGNIFICANT CHANGE UP (ref 98–110)
CHLORIDE SERPL-SCNC: 102 MMOL/L — SIGNIFICANT CHANGE UP (ref 98–110)
CO2 SERPL-SCNC: 23 MMOL/L — SIGNIFICANT CHANGE UP (ref 17–32)
CO2 SERPL-SCNC: 25 MMOL/L — SIGNIFICANT CHANGE UP (ref 17–32)
COLOR SPEC: YELLOW — SIGNIFICANT CHANGE UP
CREAT SERPL-MCNC: 0.9 MG/DL — SIGNIFICANT CHANGE UP (ref 0.7–1.5)
CREAT SERPL-MCNC: 0.9 MG/DL — SIGNIFICANT CHANGE UP (ref 0.7–1.5)
DIFF PNL FLD: ABNORMAL
EGFR: 98 ML/MIN/1.73M2 — SIGNIFICANT CHANGE UP
EGFR: 98 ML/MIN/1.73M2 — SIGNIFICANT CHANGE UP
EOSINOPHIL # BLD AUTO: 0.19 K/UL — SIGNIFICANT CHANGE UP (ref 0–0.7)
EOSINOPHIL NFR BLD AUTO: 2.6 % — SIGNIFICANT CHANGE UP (ref 0–8)
GLUCOSE SERPL-MCNC: 157 MG/DL — HIGH (ref 70–99)
GLUCOSE SERPL-MCNC: 193 MG/DL — HIGH (ref 70–99)
GLUCOSE UR QL: NEGATIVE MG/DL — SIGNIFICANT CHANGE UP
HCT VFR BLD CALC: 33.5 % — LOW (ref 42–52)
HGB BLD-MCNC: 10.7 G/DL — LOW (ref 14–18)
KETONES UR-MCNC: NEGATIVE MG/DL — SIGNIFICANT CHANGE UP
LACTATE SERPL-SCNC: 2 MMOL/L — SIGNIFICANT CHANGE UP (ref 0.7–2)
LEUKOCYTE ESTERASE UR-ACNC: NEGATIVE — SIGNIFICANT CHANGE UP
LIDOCAIN IGE QN: 30 U/L — SIGNIFICANT CHANGE UP (ref 7–60)
LYMPHOCYTES # BLD AUTO: 0.82 K/UL — LOW (ref 1.2–3.4)
LYMPHOCYTES # BLD AUTO: 11.4 % — LOW (ref 20.5–51.1)
MCHC RBC-ENTMCNC: 21.1 PG — LOW (ref 27–31)
MCHC RBC-ENTMCNC: 31.9 G/DL — LOW (ref 32–37)
MCV RBC AUTO: 65.9 FL — LOW (ref 80–94)
MONOCYTES # BLD AUTO: 0.07 K/UL — LOW (ref 0.1–0.6)
MONOCYTES NFR BLD AUTO: 0.9 % — LOW (ref 1.7–9.3)
NEUTROPHILS # BLD AUTO: 6.02 K/UL — SIGNIFICANT CHANGE UP (ref 1.4–6.5)
NEUTROPHILS NFR BLD AUTO: 83.3 % — HIGH (ref 42.2–75.2)
NITRITE UR-MCNC: NEGATIVE — SIGNIFICANT CHANGE UP
PH UR: 7.5 — SIGNIFICANT CHANGE UP (ref 5–8)
PLATELET # BLD AUTO: 146 K/UL — SIGNIFICANT CHANGE UP (ref 130–400)
PMV BLD: SIGNIFICANT CHANGE UP (ref 7.4–10.4)
POTASSIUM SERPL-MCNC: 3.9 MMOL/L — SIGNIFICANT CHANGE UP (ref 3.5–5)
POTASSIUM SERPL-MCNC: 5.6 MMOL/L — HIGH (ref 3.5–5)
POTASSIUM SERPL-SCNC: 3.9 MMOL/L — SIGNIFICANT CHANGE UP (ref 3.5–5)
POTASSIUM SERPL-SCNC: 5.6 MMOL/L — HIGH (ref 3.5–5)
PROT SERPL-MCNC: 6.7 G/DL — SIGNIFICANT CHANGE UP (ref 6–8)
PROT SERPL-MCNC: 7.5 G/DL — SIGNIFICANT CHANGE UP (ref 6–8)
PROT UR-MCNC: NEGATIVE MG/DL — SIGNIFICANT CHANGE UP
RBC # BLD: 5.08 M/UL — SIGNIFICANT CHANGE UP (ref 4.7–6.1)
RBC # FLD: 16.7 % — HIGH (ref 11.5–14.5)
SODIUM SERPL-SCNC: 136 MMOL/L — SIGNIFICANT CHANGE UP (ref 135–146)
SODIUM SERPL-SCNC: 137 MMOL/L — SIGNIFICANT CHANGE UP (ref 135–146)
SP GR SPEC: 1.01 — SIGNIFICANT CHANGE UP (ref 1–1.03)
UROBILINOGEN FLD QL: 0.2 MG/DL — SIGNIFICANT CHANGE UP (ref 0.2–1)
WBC # BLD: 7.23 K/UL — SIGNIFICANT CHANGE UP (ref 4.8–10.8)
WBC # FLD AUTO: 7.23 K/UL — SIGNIFICANT CHANGE UP (ref 4.8–10.8)

## 2024-07-05 PROCEDURE — 83690 ASSAY OF LIPASE: CPT

## 2024-07-05 PROCEDURE — 80053 COMPREHEN METABOLIC PANEL: CPT

## 2024-07-05 PROCEDURE — 85025 COMPLETE CBC W/AUTO DIFF WBC: CPT

## 2024-07-05 PROCEDURE — 96374 THER/PROPH/DIAG INJ IV PUSH: CPT | Mod: XU

## 2024-07-05 PROCEDURE — 87086 URINE CULTURE/COLONY COUNT: CPT

## 2024-07-05 PROCEDURE — 81001 URINALYSIS AUTO W/SCOPE: CPT

## 2024-07-05 PROCEDURE — 76870 US EXAM SCROTUM: CPT

## 2024-07-05 PROCEDURE — 74177 CT ABD & PELVIS W/CONTRAST: CPT | Mod: MC

## 2024-07-05 PROCEDURE — 76870 US EXAM SCROTUM: CPT | Mod: 26

## 2024-07-05 PROCEDURE — 83605 ASSAY OF LACTIC ACID: CPT

## 2024-07-05 PROCEDURE — 99285 EMERGENCY DEPT VISIT HI MDM: CPT

## 2024-07-05 PROCEDURE — 99284 EMERGENCY DEPT VISIT MOD MDM: CPT | Mod: 25

## 2024-07-05 PROCEDURE — 96375 TX/PRO/DX INJ NEW DRUG ADDON: CPT

## 2024-07-05 PROCEDURE — 74177 CT ABD & PELVIS W/CONTRAST: CPT | Mod: 26,MC

## 2024-07-05 PROCEDURE — 36415 COLL VENOUS BLD VENIPUNCTURE: CPT

## 2024-07-05 RX ORDER — KETOROLAC TROMETHAMINE 30 MG/ML
30 INJECTION, SOLUTION INTRAMUSCULAR ONCE
Refills: 0 | Status: DISCONTINUED | OUTPATIENT
Start: 2024-07-05 | End: 2024-07-05

## 2024-07-05 RX ORDER — HYDROMORPHONE HCL 0.2 MG/ML
1 INJECTION, SOLUTION INTRAVENOUS ONCE
Refills: 0 | Status: DISCONTINUED | OUTPATIENT
Start: 2024-07-05 | End: 2024-07-05

## 2024-07-05 RX ORDER — DEXTROSE MONOHYDRATE AND SODIUM CHLORIDE 5; .3 G/100ML; G/100ML
1000 INJECTION, SOLUTION INTRAVENOUS ONCE
Refills: 0 | Status: COMPLETED | OUTPATIENT
Start: 2024-07-05 | End: 2024-07-05

## 2024-07-05 RX ORDER — KETOROLAC TROMETHAMINE 30 MG/ML
1 INJECTION, SOLUTION INTRAMUSCULAR
Qty: 10 | Refills: 0
Start: 2024-07-05

## 2024-07-05 RX ORDER — TAMSULOSIN HYDROCHLORIDE 0.4 MG/1
1 CAPSULE ORAL
Qty: 4 | Refills: 0
Start: 2024-07-05

## 2024-07-05 RX ORDER — TAMSULOSIN HYDROCHLORIDE 0.4 MG/1
0.4 CAPSULE ORAL ONCE
Refills: 0 | Status: COMPLETED | OUTPATIENT
Start: 2024-07-05 | End: 2024-07-05

## 2024-07-05 RX ADMIN — KETOROLAC TROMETHAMINE 30 MILLIGRAM(S): 30 INJECTION, SOLUTION INTRAMUSCULAR at 08:56

## 2024-07-05 RX ADMIN — DEXTROSE MONOHYDRATE AND SODIUM CHLORIDE 1000 MILLILITER(S): 5; .3 INJECTION, SOLUTION INTRAVENOUS at 08:57

## 2024-07-05 RX ADMIN — TAMSULOSIN HYDROCHLORIDE 0.4 MILLIGRAM(S): 0.4 CAPSULE ORAL at 12:49

## 2024-07-05 RX ADMIN — HYDROMORPHONE HCL 1 MILLIGRAM(S): 0.2 INJECTION, SOLUTION INTRAVENOUS at 09:45

## 2024-07-06 LAB
CULTURE RESULTS: SIGNIFICANT CHANGE UP
SPECIMEN SOURCE: SIGNIFICANT CHANGE UP

## 2024-08-02 ENCOUNTER — APPOINTMENT (OUTPATIENT)
Dept: INTERNAL MEDICINE | Facility: CLINIC | Age: 59
End: 2024-08-02

## 2024-08-19 ENCOUNTER — APPOINTMENT (OUTPATIENT)
Dept: NEUROSURGERY | Facility: HOSPITAL | Age: 59
End: 2024-08-19

## 2024-08-19 ENCOUNTER — INPATIENT (INPATIENT)
Facility: HOSPITAL | Age: 59
LOS: 7 days | Discharge: HOME HEALTH | DRG: 74 | End: 2024-08-27
Attending: NEUROLOGICAL SURGERY | Admitting: NEUROLOGICAL SURGERY
Payer: COMMERCIAL

## 2024-08-19 VITALS
OXYGEN SATURATION: 97 % | HEIGHT: 72 IN | HEART RATE: 76 BPM | RESPIRATION RATE: 19 BRPM | DIASTOLIC BLOOD PRESSURE: 77 MMHG | WEIGHT: 304.9 LBS | TEMPERATURE: 98 F | SYSTOLIC BLOOD PRESSURE: 169 MMHG

## 2024-08-19 DIAGNOSIS — Z98.890 OTHER SPECIFIED POSTPROCEDURAL STATES: Chronic | ICD-10-CM

## 2024-08-19 DIAGNOSIS — M54.12 RADICULOPATHY, CERVICAL REGION: ICD-10-CM

## 2024-08-19 LAB
BASOPHILS # BLD AUTO: 0.04 K/UL — SIGNIFICANT CHANGE UP (ref 0–0.2)
BASOPHILS NFR BLD AUTO: 0.5 % — SIGNIFICANT CHANGE UP (ref 0–1)
BLD GP AB SCN SERPL QL: SIGNIFICANT CHANGE UP
EOSINOPHIL # BLD AUTO: 0.34 K/UL — SIGNIFICANT CHANGE UP (ref 0–0.7)
EOSINOPHIL NFR BLD AUTO: 4.6 % — SIGNIFICANT CHANGE UP (ref 0–8)
HCT VFR BLD CALC: 31.2 % — LOW (ref 42–52)
HGB BLD-MCNC: 9.6 G/DL — LOW (ref 14–18)
IMM GRANULOCYTES NFR BLD AUTO: 0.5 % — HIGH (ref 0.1–0.3)
LYMPHOCYTES # BLD AUTO: 1.34 K/UL — SIGNIFICANT CHANGE UP (ref 1.2–3.4)
LYMPHOCYTES # BLD AUTO: 18.1 % — LOW (ref 20.5–51.1)
MCHC RBC-ENTMCNC: 20.8 PG — LOW (ref 27–31)
MCHC RBC-ENTMCNC: 30.8 G/DL — LOW (ref 32–37)
MCV RBC AUTO: 67.7 FL — LOW (ref 80–94)
MONOCYTES # BLD AUTO: 0.58 K/UL — SIGNIFICANT CHANGE UP (ref 0.1–0.6)
MONOCYTES NFR BLD AUTO: 7.8 % — SIGNIFICANT CHANGE UP (ref 1.7–9.3)
NEUTROPHILS # BLD AUTO: 5.05 K/UL — SIGNIFICANT CHANGE UP (ref 1.4–6.5)
NEUTROPHILS NFR BLD AUTO: 68.5 % — SIGNIFICANT CHANGE UP (ref 42.2–75.2)
NRBC # BLD: 0 /100 WBCS — SIGNIFICANT CHANGE UP (ref 0–0)
PLATELET # BLD AUTO: 126 K/UL — LOW (ref 130–400)
PMV BLD: SIGNIFICANT CHANGE UP (ref 7.4–10.4)
RBC # BLD: 4.61 M/UL — LOW (ref 4.7–6.1)
RBC # FLD: 17 % — HIGH (ref 11.5–14.5)
WBC # BLD: 7.39 K/UL — SIGNIFICANT CHANGE UP (ref 4.8–10.8)
WBC # FLD AUTO: 7.39 K/UL — SIGNIFICANT CHANGE UP (ref 4.8–10.8)

## 2024-08-19 PROCEDURE — 97162 PT EVAL MOD COMPLEX 30 MIN: CPT | Mod: GP

## 2024-08-19 PROCEDURE — 97110 THERAPEUTIC EXERCISES: CPT | Mod: GP

## 2024-08-19 PROCEDURE — 86891 AUTOLOGOUS BLOOD OP SALVAGE: CPT

## 2024-08-19 PROCEDURE — 22600 ARTHRD PST TQ 1NTRSPC CRV: CPT | Mod: 62

## 2024-08-19 PROCEDURE — 97530 THERAPEUTIC ACTIVITIES: CPT | Mod: GP

## 2024-08-19 PROCEDURE — 86900 BLOOD TYPING SEROLOGIC ABO: CPT

## 2024-08-19 PROCEDURE — 22595 ARTHRD PST TQ ATLAS-AXIS: CPT | Mod: 62

## 2024-08-19 PROCEDURE — 22614 ARTHRD PST TQ 1NTRSPC EA ADD: CPT | Mod: 62

## 2024-08-19 PROCEDURE — 63048 LAM FACETEC &FORAMOT EA ADDL: CPT | Mod: 62

## 2024-08-19 PROCEDURE — 86850 RBC ANTIBODY SCREEN: CPT

## 2024-08-19 PROCEDURE — 22842 INSERT SPINE FIXATION DEVICE: CPT | Mod: 80

## 2024-08-19 PROCEDURE — 72040 X-RAY EXAM NECK SPINE 2-3 VW: CPT

## 2024-08-19 PROCEDURE — 97535 SELF CARE MNGMENT TRAINING: CPT | Mod: GO

## 2024-08-19 PROCEDURE — 80048 BASIC METABOLIC PNL TOTAL CA: CPT

## 2024-08-19 PROCEDURE — 63045 LAM FACETEC & FORAMOT CRV: CPT | Mod: 62

## 2024-08-19 PROCEDURE — 86901 BLOOD TYPING SEROLOGIC RH(D): CPT

## 2024-08-19 PROCEDURE — C1889: CPT

## 2024-08-19 PROCEDURE — C1713: CPT

## 2024-08-19 PROCEDURE — 22842 INSERT SPINE FIXATION DEVICE: CPT

## 2024-08-19 PROCEDURE — C9399: CPT

## 2024-08-19 PROCEDURE — C1751: CPT

## 2024-08-19 PROCEDURE — 61783 SCAN PROC SPINAL: CPT | Mod: 59

## 2024-08-19 PROCEDURE — 20936 SP BONE AGRFT LOCAL ADD-ON: CPT | Mod: 1L

## 2024-08-19 PROCEDURE — 97167 OT EVAL HIGH COMPLEX 60 MIN: CPT | Mod: GO

## 2024-08-19 PROCEDURE — 85025 COMPLETE CBC W/AUTO DIFF WBC: CPT

## 2024-08-19 PROCEDURE — 97116 GAIT TRAINING THERAPY: CPT | Mod: GP

## 2024-08-19 PROCEDURE — 36415 COLL VENOUS BLD VENIPUNCTURE: CPT

## 2024-08-19 RX ORDER — POLYETHYLENE GLYCOL 3350 17 G/17G
17 POWDER, FOR SOLUTION ORAL DAILY
Refills: 0 | Status: DISCONTINUED | OUTPATIENT
Start: 2024-08-19 | End: 2024-08-27

## 2024-08-19 RX ORDER — PANTOPRAZOLE SODIUM 40 MG
40 TABLET, DELAYED RELEASE (ENTERIC COATED) ORAL
Refills: 0 | Status: DISCONTINUED | OUTPATIENT
Start: 2024-08-19 | End: 2024-08-27

## 2024-08-19 RX ORDER — HYDROMORPHONE HYDROCHLORIDE 2 MG/1
30 TABLET ORAL
Refills: 0 | Status: DISCONTINUED | OUTPATIENT
Start: 2024-08-19 | End: 2024-08-21

## 2024-08-19 RX ORDER — SENNA 187 MG
2 TABLET ORAL AT BEDTIME
Refills: 0 | Status: DISCONTINUED | OUTPATIENT
Start: 2024-08-19 | End: 2024-08-27

## 2024-08-19 RX ORDER — ACETAMINOPHEN 325 MG/1
1000 TABLET ORAL ONCE
Refills: 0 | Status: COMPLETED | OUTPATIENT
Start: 2024-08-19 | End: 2024-08-19

## 2024-08-19 RX ORDER — KETOROLAC TROMETHAMINE 30 MG/ML
30 INJECTION, SOLUTION INTRAMUSCULAR EVERY 8 HOURS
Refills: 0 | Status: DISCONTINUED | OUTPATIENT
Start: 2024-08-19 | End: 2024-08-22

## 2024-08-19 RX ORDER — ONDANSETRON 2 MG/ML
4 INJECTION, SOLUTION INTRAMUSCULAR; INTRAVENOUS ONCE
Refills: 0 | Status: COMPLETED | OUTPATIENT
Start: 2024-08-19 | End: 2024-08-19

## 2024-08-19 RX ORDER — SODIUM CHLORIDE 9 MG/ML
1000 INJECTION INTRAMUSCULAR; INTRAVENOUS; SUBCUTANEOUS
Refills: 0 | Status: DISCONTINUED | OUTPATIENT
Start: 2024-08-19 | End: 2024-08-20

## 2024-08-19 RX ORDER — APREPITANT 40 MG/1
40 CAPSULE ORAL ONCE
Refills: 0 | Status: COMPLETED | OUTPATIENT
Start: 2024-08-19 | End: 2024-08-19

## 2024-08-19 RX ORDER — LEVOTHYROXINE SODIUM 100 MCG
100 TABLET ORAL DAILY
Refills: 0 | Status: DISCONTINUED | OUTPATIENT
Start: 2024-08-19 | End: 2024-08-27

## 2024-08-19 RX ORDER — DILTIAZEM HYDROCHLORIDE 5 MG/ML
120 INJECTION INTRAVENOUS DAILY
Refills: 0 | Status: DISCONTINUED | OUTPATIENT
Start: 2024-08-19 | End: 2024-08-20

## 2024-08-19 RX ORDER — HYDROMORPHONE HYDROCHLORIDE 2 MG/1
0.5 TABLET ORAL
Refills: 0 | Status: DISCONTINUED | OUTPATIENT
Start: 2024-08-19 | End: 2024-08-19

## 2024-08-19 RX ORDER — METHOCARBAMOL 750 MG/1
750 TABLET, FILM COATED ORAL EVERY 6 HOURS
Refills: 0 | Status: DISCONTINUED | OUTPATIENT
Start: 2024-08-19 | End: 2024-08-22

## 2024-08-19 RX ORDER — CEFAZOLIN SODIUM 2 G/100ML
2000 INJECTION, SOLUTION INTRAVENOUS EVERY 8 HOURS
Refills: 0 | Status: COMPLETED | OUTPATIENT
Start: 2024-08-19 | End: 2024-08-19

## 2024-08-19 RX ORDER — HEPARIN SODIUM,BOVINE 1000/ML
5000 VIAL (ML) INJECTION EVERY 8 HOURS
Refills: 0 | Status: DISCONTINUED | OUTPATIENT
Start: 2024-08-20 | End: 2024-08-27

## 2024-08-19 RX ORDER — GABAPENTIN 100 MG
300 CAPSULE ORAL ONCE
Refills: 0 | Status: COMPLETED | OUTPATIENT
Start: 2024-08-19 | End: 2024-08-19

## 2024-08-19 RX ORDER — ACETAMINOPHEN 325 MG/1
1000 TABLET ORAL ONCE
Refills: 0 | Status: COMPLETED | OUTPATIENT
Start: 2024-08-19 | End: 2024-08-20

## 2024-08-19 RX ADMIN — DILTIAZEM HYDROCHLORIDE 120 MILLIGRAM(S): 5 INJECTION INTRAVENOUS at 20:06

## 2024-08-19 RX ADMIN — Medication 2 TABLET(S): at 20:06

## 2024-08-19 RX ADMIN — ONDANSETRON 4 MILLIGRAM(S): 2 INJECTION, SOLUTION INTRAMUSCULAR; INTRAVENOUS at 18:42

## 2024-08-19 RX ADMIN — SODIUM CHLORIDE 100 MILLILITER(S): 9 INJECTION INTRAMUSCULAR; INTRAVENOUS; SUBCUTANEOUS at 13:19

## 2024-08-19 RX ADMIN — HYDROMORPHONE HYDROCHLORIDE 30 MILLILITER(S): 2 TABLET ORAL at 13:19

## 2024-08-19 RX ADMIN — CEFAZOLIN SODIUM 100 MILLIGRAM(S): 2 INJECTION, SOLUTION INTRAVENOUS at 20:05

## 2024-08-19 RX ADMIN — HYDROMORPHONE HYDROCHLORIDE 0.5 MILLIGRAM(S): 2 TABLET ORAL at 12:38

## 2024-08-19 RX ADMIN — KETOROLAC TROMETHAMINE 30 MILLIGRAM(S): 30 INJECTION, SOLUTION INTRAMUSCULAR at 20:06

## 2024-08-19 RX ADMIN — APREPITANT 40 MILLIGRAM(S): 40 CAPSULE ORAL at 06:32

## 2024-08-19 RX ADMIN — CEFAZOLIN SODIUM 100 MILLIGRAM(S): 2 INJECTION, SOLUTION INTRAVENOUS at 15:42

## 2024-08-19 RX ADMIN — SODIUM CHLORIDE 100 MILLILITER(S): 9 INJECTION INTRAMUSCULAR; INTRAVENOUS; SUBCUTANEOUS at 20:06

## 2024-08-19 RX ADMIN — METHOCARBAMOL 750 MILLIGRAM(S): 750 TABLET, FILM COATED ORAL at 18:19

## 2024-08-19 RX ADMIN — Medication 300 MILLIGRAM(S): at 06:33

## 2024-08-19 RX ADMIN — ACETAMINOPHEN 1000 MILLIGRAM(S): 325 TABLET ORAL at 12:23

## 2024-08-19 RX ADMIN — METHOCARBAMOL 750 MILLIGRAM(S): 750 TABLET, FILM COATED ORAL at 20:06

## 2024-08-19 RX ADMIN — ACETAMINOPHEN 1000 MILLIGRAM(S): 325 TABLET ORAL at 06:33

## 2024-08-19 RX ADMIN — HYDROMORPHONE HYDROCHLORIDE 0.5 MILLIGRAM(S): 2 TABLET ORAL at 12:08

## 2024-08-19 RX ADMIN — KETOROLAC TROMETHAMINE 30 MILLIGRAM(S): 30 INJECTION, SOLUTION INTRAMUSCULAR at 20:14

## 2024-08-19 RX ADMIN — ACETAMINOPHEN 400 MILLIGRAM(S): 325 TABLET ORAL at 12:08

## 2024-08-19 NOTE — CONSULT NOTE ADULT - ASSESSMENT
IMPRESSION: Rehab of cervical myelopathy with gait ataxia, poor  strength / s/p CERVICAL 1-5 LAMINECTOMY INSTRUMENTATION AND FUSIONhypothyroidism, YANIRA (on CPAP), AFIB (on no anticoagulation), and sciatica, obesity     PRECAUTIONS: [   ] Cardiac  [   ] Respiratory  [   ] Seizures [   ] Contact Isolation  [   ] Droplet Isolation  [   ] Other    Weight Bearing Status:     RECOMMENDATION:    Out of Bed to Chair     DVT/Decubiti Prophylaxis    REHAB PLAN:     [ x   ] Bedside P/T 3-5 times a week   [ x   ]   Bedside O/T  2-3 times a week             [    ] Speech Therapy               [    ]  No Rehab Therapy Indicated   Conditioning/ROM                                    ADL  Bed Mobility                                               Conditioning/ROM  Transfers                                                     Bed Mobility  Sitting /Standing Balance                         Transfers                                        Gait Training                                               Sitting/Standing Balance  Stair Training [   ]Applicable                    Home equipment Eval                                                                        Splinting  [   ] Only      GOALS:   ADL   [  x  ]   Independent                    Transfers  [ x   ] Independent                          Ambulation  [  x  ] Independent     [  x   ] With device                            [    ]  CG                                                         [    ]  CG                                                                  [    ] CG                            [    ] Min A                                                   [    ] Min A                                                              [    ] Min  A          DISCHARGE PLAN:   [    ]  Good candidate for Intensive Rehabilitation/Hospital based                                             Will tolerate 3hrs Intensive Rehab Daily                                       [     ]  Short Term Rehab in Skilled Nursing Facility                                       [     ]  Home with Outpatient or  services                                         [ x    ]  Possible Candidate for Intensive Hospital based Rehab

## 2024-08-19 NOTE — PROGRESS NOTE ADULT - SUBJECTIVE AND OBJECTIVE BOX
NEUROSURGERY POST OP NOTE:    S/P Posterior C4-7 laminectomy & fusion    Pt seen and examined at bedside in PACU. Pt wife at bedside. Pt c/o incisional pain. Sts he can't tell if preop symptoms are resolving yet. Using PCA pump as instructed.            T(C): 36.8 (08-19-24 @ 11:45), Max: 36.8 (08-19-24 @ 06:23)  HR: 69 (08-19-24 @ 15:30) (62 - 88)  BP: 159/70 (08-19-24 @ 15:30) (113/58 - 169/77)  RR: 14 (08-19-24 @ 15:30) (12 - 20)  SpO2: 97% (08-19-24 @ 15:30) (95% - 99%)      08-19-24 @ 07:01  -  08-19-24 @ 17:23  --------------------------------------------------------  IN: 500 mL / OUT: 950 mL / NET: -450 mL        ceFAZolin   IVPB 2000 milliGRAM(s) IV Intermittent every 8 hours  diltiazem    Tablet 120 milliGRAM(s) Oral daily  HYDROmorphone  Injectable 0.5 milliGRAM(s) IV Push every 10 minutes PRN  HYDROmorphone PCA (1 mG/mL) 30 milliLiter(s) PCA Continuous PCA Continuous  levothyroxine 100 MICROGram(s) Oral daily  methocarbamol 750 milliGRAM(s) Oral every 6 hours  ondansetron Injectable 4 milliGRAM(s) IV Push once PRN  pantoprazole    Tablet 40 milliGRAM(s) Oral before breakfast  polyethylene glycol 3350 17 Gram(s) Oral daily  senna 2 Tablet(s) Oral at bedtime  sodium chloride 0.9%. 1000 milliLiter(s) IV Continuous <Continuous>            Exam:  AAOX3.   Speech clear  follows commands  facial motions symmetric  PERRL  Motor: MAEx4  Good hand   5/5 power in b/l UE  NO Hoffmans  5/5 power b/l LE  Sensation: intact and equal      WOUND/DRAINS: incision covered with dressing, mild staining. HV with minimal bloody drainage        Assessment:   S/P Posterior C4-7 laminectomy & fusion    Plan:  Monitor and record HV output  Incentive spirometer 10x q1hr  Cont PCA pump for pain mgt  Start SQH for DVT ppx tomorrow  PT/OT/rehab consult  Hospitalist consult for medical comanagement  Pt to use home Bipap machine for sleep  Await bed on 4C  d/w attg

## 2024-08-19 NOTE — BRIEF OPERATIVE NOTE - OPERATION/FINDINGS
C1-4 posterior cervical decompression, instrumentation and fusion
C1-4 laminectomy instrumentation and fusion

## 2024-08-19 NOTE — CHART NOTE - NSCHARTNOTEFT_GEN_A_CORE
PACU ANESTHESIA ADMISSION NOTE      Procedure: Laminectomy, cervical, with foraminotomy or facetectomy, 1 level    Fusion, spine, cervical, 4 levels, posterior approach      Post op diagnosis:  Cervical spondylosis with myelopathy        ____  Intubated  TV:______       Rate: ______      FiO2: ______    __x__  Patent Airway    __x__  Full return of protective reflexes    ____  Full recovery from anesthesia / back to baseline status    Vitals:  T(C): 36.8 (08-19-24 @ 07:17), Max: 36.8 (08-19-24 @ 06:23)  HR: 76 (08-19-24 @ 07:17) (76 - 88)  BP: 169/77 (08-19-24 @ 07:17) (169/77 - 169/77)  RR: 19 (08-19-24 @ 07:17) (19 - 20)  SpO2: 97% (08-19-24 @ 07:17) (97% - 98%)    Mental Status:  __x__ Awake   ___x__ Alert   ___x__ Drowsy   _____ Sedated    Nausea/Vomiting:  __x__ NO  ______Yes,   See Post - Op Orders          Pain Scale (0-10):  _____    Treatment: ____ None    __x__ See Post - Op/PCA Orders    Post - Operative Fluids:   ____ Oral   __x__ See Post - Op Orders    Plan: Discharge:   ____Home       ___x__Floor     _____Critical Care    _____  Other:_________________    Comments: Patient had smooth intraoperative event, no anesthesia complication.  PACU Vital signs: stable, per record

## 2024-08-19 NOTE — PATIENT PROFILE ADULT - FALL HARM RISK - FACTORS NURSING JUDGEMENT
Yes Complex Repair And Z Plasty Text: The defect edges were debeveled with a #15 scalpel blade.  The primary defect was closed partially with a complex linear closure.  Given the location of the remaining defect, shape of the defect and the proximity to free margins a Z plasty was deemed most appropriate for complete closure of the defect.  Using a sterile surgical marker, an appropriate advancement flap was drawn incorporating the defect and placing the expected incisions within the relaxed skin tension lines where possible. The area thus outlined was incised deep to adipose tissue with a #15 scalpel blade. The skin margins were undermined to an appropriate distance in all directions utilizing iris scissors and carried over to close the primary defect.

## 2024-08-19 NOTE — BRIEF OPERATIVE NOTE - NSICDXBRIEFPROCEDURE_GEN_ALL_CORE_FT
PROCEDURES:  Laminectomy, cervical, with foraminotomy or facetectomy, 1 level 19-Aug-2024 11:00:35  Morelia Winston  
PROCEDURES:  Fusion, spine, cervical, 4 levels, posterior approach 19-Aug-2024 11:36:40  Ger Johnson

## 2024-08-19 NOTE — PATIENT PROFILE ADULT - FALL HARM RISK - HARM RISK INTERVENTIONS
Assistance with ambulation/Assistance OOB with selected safe patient handling equipment/Communicate Risk of Fall with Harm to all staff/Discuss with provider need for PT consult/Monitor gait and stability/Provide patient with walking aids - walker, cane, crutches/Reinforce activity limits and safety measures with patient and family/Sit up slowly, dangle for a short time, stand at bedside before walking/Tailored Fall Risk Interventions/Use of alarms - bed, chair and/or voice tab/Visual Cue: Yellow wristband and red socks/Bed in lowest position, wheels locked, appropriate side rails in place/Call bell, personal items and telephone in reach/Instruct patient to call for assistance before getting out of bed or chair/Non-slip footwear when patient is out of bed/[place] to call system/Physically safe environment - no spills, clutter or unnecessary equipment/Purposeful Proactive Rounding/Room/bathroom lighting operational, light cord in reach

## 2024-08-20 LAB
ANION GAP SERPL CALC-SCNC: 16 MMOL/L — HIGH (ref 7–14)
BASOPHILS # BLD AUTO: 0.03 K/UL — SIGNIFICANT CHANGE UP (ref 0–0.2)
BASOPHILS NFR BLD AUTO: 0.3 % — SIGNIFICANT CHANGE UP (ref 0–1)
BUN SERPL-MCNC: 12 MG/DL — SIGNIFICANT CHANGE UP (ref 10–20)
CALCIUM SERPL-MCNC: 8.2 MG/DL — LOW (ref 8.4–10.5)
CHLORIDE SERPL-SCNC: 103 MMOL/L — SIGNIFICANT CHANGE UP (ref 98–110)
CO2 SERPL-SCNC: 22 MMOL/L — SIGNIFICANT CHANGE UP (ref 17–32)
CREAT SERPL-MCNC: 0.8 MG/DL — SIGNIFICANT CHANGE UP (ref 0.7–1.5)
EGFR: 101 ML/MIN/1.73M2 — SIGNIFICANT CHANGE UP
EOSINOPHIL # BLD AUTO: 0.08 K/UL — SIGNIFICANT CHANGE UP (ref 0–0.7)
EOSINOPHIL NFR BLD AUTO: 0.8 % — SIGNIFICANT CHANGE UP (ref 0–8)
GLUCOSE SERPL-MCNC: 159 MG/DL — HIGH (ref 70–99)
HCT VFR BLD CALC: 32.1 % — LOW (ref 42–52)
HGB BLD-MCNC: 9.8 G/DL — LOW (ref 14–18)
IMM GRANULOCYTES NFR BLD AUTO: 0.5 % — HIGH (ref 0.1–0.3)
LYMPHOCYTES # BLD AUTO: 0.99 K/UL — LOW (ref 1.2–3.4)
LYMPHOCYTES # BLD AUTO: 9.9 % — LOW (ref 20.5–51.1)
MCHC RBC-ENTMCNC: 20.9 PG — LOW (ref 27–31)
MCHC RBC-ENTMCNC: 30.5 G/DL — LOW (ref 32–37)
MCV RBC AUTO: 68.3 FL — LOW (ref 80–94)
MONOCYTES # BLD AUTO: 0.85 K/UL — HIGH (ref 0.1–0.6)
MONOCYTES NFR BLD AUTO: 8.5 % — SIGNIFICANT CHANGE UP (ref 1.7–9.3)
NEUTROPHILS # BLD AUTO: 7.95 K/UL — HIGH (ref 1.4–6.5)
NEUTROPHILS NFR BLD AUTO: 80 % — HIGH (ref 42.2–75.2)
NRBC # BLD: 0 /100 WBCS — SIGNIFICANT CHANGE UP (ref 0–0)
PLATELET # BLD AUTO: 122 K/UL — LOW (ref 130–400)
PMV BLD: SIGNIFICANT CHANGE UP (ref 7.4–10.4)
POTASSIUM SERPL-MCNC: 3.8 MMOL/L — SIGNIFICANT CHANGE UP (ref 3.5–5)
POTASSIUM SERPL-SCNC: 3.8 MMOL/L — SIGNIFICANT CHANGE UP (ref 3.5–5)
RBC # BLD: 4.7 M/UL — SIGNIFICANT CHANGE UP (ref 4.7–6.1)
RBC # FLD: 16.7 % — HIGH (ref 11.5–14.5)
SODIUM SERPL-SCNC: 141 MMOL/L — SIGNIFICANT CHANGE UP (ref 135–146)
WBC # BLD: 9.95 K/UL — SIGNIFICANT CHANGE UP (ref 4.8–10.8)
WBC # FLD AUTO: 9.95 K/UL — SIGNIFICANT CHANGE UP (ref 4.8–10.8)

## 2024-08-20 PROCEDURE — 99232 SBSQ HOSP IP/OBS MODERATE 35: CPT

## 2024-08-20 RX ORDER — DILTIAZEM HYDROCHLORIDE 5 MG/ML
120 INJECTION INTRAVENOUS DAILY
Refills: 0 | Status: DISCONTINUED | OUTPATIENT
Start: 2024-08-20 | End: 2024-08-27

## 2024-08-20 RX ORDER — DIAZEPAM 10 MG
5 TABLET ORAL ONCE
Refills: 0 | Status: DISCONTINUED | OUTPATIENT
Start: 2024-08-20 | End: 2024-08-20

## 2024-08-20 RX ADMIN — Medication 100 MICROGRAM(S): at 05:15

## 2024-08-20 RX ADMIN — Medication 5000 UNIT(S): at 14:55

## 2024-08-20 RX ADMIN — KETOROLAC TROMETHAMINE 30 MILLIGRAM(S): 30 INJECTION, SOLUTION INTRAMUSCULAR at 14:55

## 2024-08-20 RX ADMIN — Medication 5 MILLIGRAM(S): at 21:55

## 2024-08-20 RX ADMIN — KETOROLAC TROMETHAMINE 30 MILLIGRAM(S): 30 INJECTION, SOLUTION INTRAMUSCULAR at 05:16

## 2024-08-20 RX ADMIN — KETOROLAC TROMETHAMINE 30 MILLIGRAM(S): 30 INJECTION, SOLUTION INTRAMUSCULAR at 05:46

## 2024-08-20 RX ADMIN — METHOCARBAMOL 750 MILLIGRAM(S): 750 TABLET, FILM COATED ORAL at 17:49

## 2024-08-20 RX ADMIN — ACETAMINOPHEN 1000 MILLIGRAM(S): 325 TABLET ORAL at 03:00

## 2024-08-20 RX ADMIN — METHOCARBAMOL 750 MILLIGRAM(S): 750 TABLET, FILM COATED ORAL at 05:15

## 2024-08-20 RX ADMIN — DILTIAZEM HYDROCHLORIDE 120 MILLIGRAM(S): 5 INJECTION INTRAVENOUS at 05:15

## 2024-08-20 RX ADMIN — Medication 5000 UNIT(S): at 05:15

## 2024-08-20 RX ADMIN — Medication 5000 UNIT(S): at 21:29

## 2024-08-20 RX ADMIN — Medication 2 TABLET(S): at 21:29

## 2024-08-20 RX ADMIN — METHOCARBAMOL 750 MILLIGRAM(S): 750 TABLET, FILM COATED ORAL at 11:49

## 2024-08-20 RX ADMIN — KETOROLAC TROMETHAMINE 30 MILLIGRAM(S): 30 INJECTION, SOLUTION INTRAMUSCULAR at 21:29

## 2024-08-20 RX ADMIN — Medication 40 MILLIGRAM(S): at 05:16

## 2024-08-20 RX ADMIN — ACETAMINOPHEN 400 MILLIGRAM(S): 325 TABLET ORAL at 02:07

## 2024-08-20 NOTE — OCCUPATIONAL THERAPY INITIAL EVALUATION ADULT - SPECIFY REASON(S)
Pt approached for OT IE. Pt declined to participate in assessment at this time 2* 10/10 pain. RN notified. Will follow up.

## 2024-08-20 NOTE — PROGRESS NOTE ADULT - ASSESSMENT
59 y.o.M with a hx of hypothyroidism, YANIRA (on CPAP), AFIB (on no anticoagulation), and sciatica presents for Cervical 1-4 Laminectomy and Fusion, POD 1     Cervical 1-4 Laminectomy and Fusion, POD 1   - Pain Control/ PT/OT   - Management per surgery     Atrial fibrillation- c/w cardizem     Hypothyroidism - c/w synthroid     Sleep apnea with use of continuous positive airway pressure (CPAP)    Management per NSGY , Medicine for co-management     Total time spent to complete patient's bedside assessment, review medical chart, discuss medical plan of care with covering medical team was more than 35 minutes  with >50% of time spent face to face with patient, discussion with patient/family and/or coordination of care. My note supersedes them medical resident note in case of discrepancy.      Dari Lucas, DO

## 2024-08-20 NOTE — PROGRESS NOTE ADULT - SUBJECTIVE AND OBJECTIVE BOX
POD # 1      S/P Posterior C4-7 laminectomy & fusion       pt seen and examined at bedside pt is alert has c/o posterior neck discomfort  , pt wishes to stay on the PCA pump now . + flatus no BM       Vital Signs Last 24 Hrs  T(C): 37.3 (20 Aug 2024 08:32), Max: 37.4 (20 Aug 2024 00:05)  T(F): 99.2 (20 Aug 2024 08:32), Max: 99.4 (20 Aug 2024 00:05)  HR: 72 (20 Aug 2024 08:32) (62 - 92)  BP: 162/72 (20 Aug 2024 08:32) (113/58 - 199/92)  BP(mean): 102 (20 Aug 2024 08:32) (78 - 108)  RR: 18 (20 Aug 2024 08:32) (12 - 20)  SpO2: 95% (20 Aug 2024 08:32) (93% - 99%)    Parameters below as of 19 Aug 2024 18:00  Patient On (Oxygen Delivery Method): nasal cannula  O2 Flow (L/min): 2      PHYSICAL EXAM:    Alert, Follows commands   MAEX4   MS RUE 5/5         LUE 5-/5         RLE 5/5         LLE 5/5   Neg hoffmans         MEDICATIONS:  Antibiotics:    Neuro:  HYDROmorphone PCA (1 mG/mL) 30 milliLiter(s) PCA Continuous PCA Continuous  ketorolac   Injectable 30 milliGRAM(s) IV Push every 8 hours  methocarbamol 750 milliGRAM(s) Oral every 6 hours    Anticoagulation:  heparin   Injectable 5000 Unit(s) SubCutaneous every 8 hours    OTHER:  diltiazem    milliGRAM(s) Oral daily  levothyroxine 100 MICROGram(s) Oral daily  pantoprazole    Tablet 40 milliGRAM(s) Oral before breakfast  polyethylene glycol 3350 17 Gram(s) Oral daily  senna 2 Tablet(s) Oral at bedtime    IVF:        LABS:                        9.8    9.95  )-----------( 122      ( 20 Aug 2024 06:00 )             32.1     08-20    141  |  103  |  12  ----------------------------<  159<H>  3.8   |  22  |  0.8    Ca    8.2<L>      20 Aug 2024 06:00        Urinalysis Basic - ( 20 Aug 2024 06:00 )    Color: x / Appearance: x / SG: x / pH: x  Gluc: 159 mg/dL / Ketone: x  / Bili: x / Urobili: x   Blood: x / Protein: x / Nitrite: x   Leuk Esterase: x / RBC: x / WBC x   Sq Epi: x / Non Sq Epi: x / Bacteria: x        A/p                       S/P Posterior C4-7 laminectomy & fusion               DC cope catheter                Monitor HVC output q shift                PCA for pain mgt now                PT/OT/Rehab               PT/OT/Rehab

## 2024-08-20 NOTE — PROGRESS NOTE ADULT - SUBJECTIVE AND OBJECTIVE BOX
VAL[place] LOBO  59y, Male  Allergy: morphine (Other; Pruritus)  Allergy Status Unknown    Hospital Day: 1d    HPI:   59 y.o.M with a hx of hypothyroidism, YANIRA (on CPAP), AFIB (on no anticoagulation), and sciatica presents for CERVICAL 1-5 LAMINECTOMY INSTRUMENTATION AND FUSION. He admits he has neck pain x 1.5 years associated with -FROM that radiates down midline of spine. He admits he is prescribed medical marijuana for pain relief. He denies taking any OTC medications for pain relief, trauma, recent falls, confusion, memory loss, stroke, tics, tremors, vision changes, swelling, discoloration, inability to ambulate,  numbness/tingling, urinary incontinence/changes, abdominal pain, and N/V/D.   PATIENT/GUARDIAN CURRENTLY DENIES CHEST PAIN  SHORTNESS OF BREATH  PALPITATIONS,  DYSURIA, OR UPPER RESPIRATORY INFECTION IN PAST 2 WEEKS  denies travel outside the [place] in the past 30 days    Subjective:   Patient seen and examined earlier today. No complaints post op day 1 , feeling well.     PMH/PSH:  PAST MEDICAL & SURGICAL HISTORY:  Sleep apnea with use of continuous positive airway pressure (CPAP)      Atrial fibrillation, unspecified type      Sciatica      Hypothyroidism      H/O colonoscopy      History of endoscopy      History of ear surgery          LAST 24-Hr EVENTS:    VITALS:  T(F): 99.5 (08-20-24 @ 12:13), Max: 99.5 (08-20-24 @ 12:13)  HR: 88 (08-20-24 @ 15:51)  BP: 172/73 (08-20-24 @ 15:51) (145/62 - 199/92)  RR: 18 (08-20-24 @ 12:13)  SpO2: 96% (08-20-24 @ 12:13)        TESTS & MEASUREMENTS:  Weight (Kg): 138.3 (08-19-24 @ 07:42)  BMI (kg/m2): 41.3 (08-19)    08-19-24 @ 07:01  -  08-20-24 @ 07:00  --------------------------------------------------------  IN: 500 mL / OUT: 1510 mL / NET: -1010 mL    08-20-24 @ 07:01  -  08-20-24 @ 16:45  --------------------------------------------------------  IN: 0 mL / OUT: 500 mL / NET: -500 mL                            9.8    9.95  )-----------( 122      ( 20 Aug 2024 06:00 )             32.1       08-20    141  |  103  |  12  ----------------------------<  159<H>  3.8   |  22  |  0.8    Ca    8.2<L>      20 Aug 2024 06:00              Urinalysis Basic - ( 20 Aug 2024 06:00 )    Color: x / Appearance: x / SG: x / pH: x  Gluc: 159 mg/dL / Ketone: x  / Bili: x / Urobili: x   Blood: x / Protein: x / Nitrite: x   Leuk Esterase: x / RBC: x / WBC x   Sq Epi: x / Non Sq Epi: x / Bacteria: x                  RADIOLOGY, ECG, & ADDITIONAL TESTS:      RECENT DIAGNOSTIC ORDERS:      MEDICATIONS:  MEDICATIONS  (STANDING):  diltiazem    milliGRAM(s) Oral daily  heparin   Injectable 5000 Unit(s) SubCutaneous every 8 hours  HYDROmorphone PCA (1 mG/mL) 30 milliLiter(s) PCA Continuous PCA Continuous  ketorolac   Injectable 30 milliGRAM(s) IV Push every 8 hours  levothyroxine 100 MICROGram(s) Oral daily  methocarbamol 750 milliGRAM(s) Oral every 6 hours  pantoprazole    Tablet 40 milliGRAM(s) Oral before breakfast  polyethylene glycol 3350 17 Gram(s) Oral daily  senna 2 Tablet(s) Oral at bedtime    MEDICATIONS  (PRN):      HOME MEDICATIONS:  Cardizem 120 mg oral tablet (07-25)  levothyroxine 100 mcg (0.1 mg) oral tablet (07-25)      PHYSICAL EXAM:  GENERAL: A&O x3,  in NAD, obese   HNENT: EOMI, PERRLA    NECK: No LAD/swelling  CHEST/LUNG:  CTAB no wheezes/rales/ronchi  HEART: RRR, No murmurs  ABDOMEN: Soft, NT, ND,  Bowel sounds present  EXTREMITIES:  Warm well perfused, trace LE edema

## 2024-08-20 NOTE — PHYSICAL THERAPY INITIAL EVALUATION ADULT - SPECIFY REASON(S)
PT attempted to see the patient for PT eval. Patient c/o severe pain at the surgical site (10/10). Patient currently is on PCA pump, reported that he used the pump but not helping.

## 2024-08-21 LAB
BASOPHILS # BLD AUTO: 0.04 K/UL — SIGNIFICANT CHANGE UP (ref 0–0.2)
BASOPHILS NFR BLD AUTO: 0.4 % — SIGNIFICANT CHANGE UP (ref 0–1)
EOSINOPHIL # BLD AUTO: 0.08 K/UL — SIGNIFICANT CHANGE UP (ref 0–0.7)
EOSINOPHIL NFR BLD AUTO: 0.8 % — SIGNIFICANT CHANGE UP (ref 0–8)
HCT VFR BLD CALC: 31.1 % — LOW (ref 42–52)
HGB BLD-MCNC: 9.5 G/DL — LOW (ref 14–18)
IMM GRANULOCYTES NFR BLD AUTO: 0.6 % — HIGH (ref 0.1–0.3)
LYMPHOCYTES # BLD AUTO: 1.27 K/UL — SIGNIFICANT CHANGE UP (ref 1.2–3.4)
LYMPHOCYTES # BLD AUTO: 12.9 % — LOW (ref 20.5–51.1)
MCHC RBC-ENTMCNC: 20.8 PG — LOW (ref 27–31)
MCHC RBC-ENTMCNC: 30.5 G/DL — LOW (ref 32–37)
MCV RBC AUTO: 68.2 FL — LOW (ref 80–94)
MONOCYTES # BLD AUTO: 0.81 K/UL — HIGH (ref 0.1–0.6)
MONOCYTES NFR BLD AUTO: 8.2 % — SIGNIFICANT CHANGE UP (ref 1.7–9.3)
NEUTROPHILS # BLD AUTO: 7.58 K/UL — HIGH (ref 1.4–6.5)
NEUTROPHILS NFR BLD AUTO: 77.1 % — HIGH (ref 42.2–75.2)
NRBC # BLD: 0 /100 WBCS — SIGNIFICANT CHANGE UP (ref 0–0)
PLATELET # BLD AUTO: 105 K/UL — LOW (ref 130–400)
PMV BLD: SIGNIFICANT CHANGE UP (ref 7.4–10.4)
RBC # BLD: 4.56 M/UL — LOW (ref 4.7–6.1)
RBC # FLD: 16.5 % — HIGH (ref 11.5–14.5)
WBC # BLD: 9.84 K/UL — SIGNIFICANT CHANGE UP (ref 4.8–10.8)
WBC # FLD AUTO: 9.84 K/UL — SIGNIFICANT CHANGE UP (ref 4.8–10.8)

## 2024-08-21 PROCEDURE — 99232 SBSQ HOSP IP/OBS MODERATE 35: CPT

## 2024-08-21 RX ORDER — HYDROMORPHONE HYDROCHLORIDE 2 MG/1
1 TABLET ORAL EVERY 4 HOURS
Refills: 0 | Status: DISCONTINUED | OUTPATIENT
Start: 2024-08-21 | End: 2024-08-27

## 2024-08-21 RX ORDER — ONDANSETRON 2 MG/ML
4 INJECTION, SOLUTION INTRAMUSCULAR; INTRAVENOUS EVERY 6 HOURS
Refills: 0 | Status: DISCONTINUED | OUTPATIENT
Start: 2024-08-21 | End: 2024-08-27

## 2024-08-21 RX ORDER — MAGNESIUM, ALUMINUM HYDROXIDE 200-225/5
30 SUSPENSION, ORAL (FINAL DOSE FORM) ORAL EVERY 4 HOURS
Refills: 0 | Status: DISCONTINUED | OUTPATIENT
Start: 2024-08-21 | End: 2024-08-27

## 2024-08-21 RX ORDER — CHLORHEXIDINE GLUCONATE 40 MG/ML
1 SOLUTION TOPICAL
Refills: 0 | Status: DISCONTINUED | OUTPATIENT
Start: 2024-08-21 | End: 2024-08-27

## 2024-08-21 RX ORDER — DIAZEPAM 10 MG
5 TABLET ORAL EVERY 8 HOURS
Refills: 0 | Status: DISCONTINUED | OUTPATIENT
Start: 2024-08-21 | End: 2024-08-22

## 2024-08-21 RX ORDER — HYDROMORPHONE HYDROCHLORIDE 2 MG/1
2 TABLET ORAL EVERY 4 HOURS
Refills: 0 | Status: DISCONTINUED | OUTPATIENT
Start: 2024-08-21 | End: 2024-08-22

## 2024-08-21 RX ADMIN — Medication 100 MICROGRAM(S): at 05:41

## 2024-08-21 RX ADMIN — HYDROMORPHONE HYDROCHLORIDE 1 MILLIGRAM(S): 2 TABLET ORAL at 19:45

## 2024-08-21 RX ADMIN — HYDROMORPHONE HYDROCHLORIDE 1 MILLIGRAM(S): 2 TABLET ORAL at 23:13

## 2024-08-21 RX ADMIN — Medication 5000 UNIT(S): at 21:09

## 2024-08-21 RX ADMIN — KETOROLAC TROMETHAMINE 30 MILLIGRAM(S): 30 INJECTION, SOLUTION INTRAMUSCULAR at 05:41

## 2024-08-21 RX ADMIN — Medication 40 MILLIGRAM(S): at 05:41

## 2024-08-21 RX ADMIN — POLYETHYLENE GLYCOL 3350 17 GRAM(S): 17 POWDER, FOR SOLUTION ORAL at 11:08

## 2024-08-21 RX ADMIN — ONDANSETRON 4 MILLIGRAM(S): 2 INJECTION, SOLUTION INTRAMUSCULAR; INTRAVENOUS at 13:54

## 2024-08-21 RX ADMIN — HYDROMORPHONE HYDROCHLORIDE 1 MILLIGRAM(S): 2 TABLET ORAL at 19:14

## 2024-08-21 RX ADMIN — HYDROMORPHONE HYDROCHLORIDE 1 MILLIGRAM(S): 2 TABLET ORAL at 13:57

## 2024-08-21 RX ADMIN — KETOROLAC TROMETHAMINE 30 MILLIGRAM(S): 30 INJECTION, SOLUTION INTRAMUSCULAR at 13:35

## 2024-08-21 RX ADMIN — Medication 5 MILLIGRAM(S): at 17:20

## 2024-08-21 RX ADMIN — Medication 30 MILLILITER(S): at 13:54

## 2024-08-21 RX ADMIN — METHOCARBAMOL 750 MILLIGRAM(S): 750 TABLET, FILM COATED ORAL at 17:20

## 2024-08-21 RX ADMIN — METHOCARBAMOL 750 MILLIGRAM(S): 750 TABLET, FILM COATED ORAL at 11:08

## 2024-08-21 RX ADMIN — Medication 5000 UNIT(S): at 13:35

## 2024-08-21 RX ADMIN — HYDROMORPHONE HYDROCHLORIDE 1 MILLIGRAM(S): 2 TABLET ORAL at 23:45

## 2024-08-21 RX ADMIN — HYDROMORPHONE HYDROCHLORIDE 1 MILLIGRAM(S): 2 TABLET ORAL at 14:12

## 2024-08-21 RX ADMIN — METHOCARBAMOL 750 MILLIGRAM(S): 750 TABLET, FILM COATED ORAL at 05:41

## 2024-08-21 RX ADMIN — KETOROLAC TROMETHAMINE 30 MILLIGRAM(S): 30 INJECTION, SOLUTION INTRAMUSCULAR at 21:10

## 2024-08-21 RX ADMIN — Medication 5000 UNIT(S): at 05:41

## 2024-08-21 RX ADMIN — METHOCARBAMOL 750 MILLIGRAM(S): 750 TABLET, FILM COATED ORAL at 23:13

## 2024-08-21 RX ADMIN — DILTIAZEM HYDROCHLORIDE 120 MILLIGRAM(S): 5 INJECTION INTRAVENOUS at 05:43

## 2024-08-21 NOTE — PROVIDER CONTACT NOTE (OTHER) - BACKGROUND
bladder scans as per pt and report he is voiding pt is refusing q6 hour bladder scans as per pt and report he is voiding pt is refusing q6 hour bladder scans as per pt and report he is voiding. pt has PCA pump but no orders for IVF, NS @ 10cc/hr was running from previous shift.

## 2024-08-21 NOTE — PHYSICAL THERAPY INITIAL EVALUATION ADULT - PERTINENT HX OF CURRENT PROBLEM, REHAB EVAL
59 y.o.M with a hx of hypothyroidism, YANIRA (on CPAP), AFIB (on no anticoagulation), and sciatica presents for CERVICAL 1-5 LAMINECTOMY INSTRUMENTATION AND FUSION. He admits he has neck pain x 1.5 years associated with -FROM that radiates down midline of spine. He admits he is prescribed medical marijuana for pain relief. He denies taking any OTC medications for pain relief, trauma, recent falls, confusion, memory loss, stroke, tics, tremors, vision changes, swelling, discoloration, inability to ambulate,  numbness/tingling, urinary incontinence/changes, abdominal pain, and N/V/D.   PATIENT/GUARDIAN CURRENTLY DENIES CHEST PAIN  SHORTNESS OF BREATH  PALPITATIONS,  DYSURIA, OR UPPER RESPIRATORY INFECTION IN PAST 2 WEEKS  den[place] travel outside the [place] in the past 30 days

## 2024-08-21 NOTE — PROVIDER CONTACT NOTE (OTHER) - SITUATION
pt's evening BP is currently 170/80 HR 74. pt is refusing offered ice packs or heat packs. pt is also refusing Dilaudid he is requesting valium. pt's neck dressing is also weeping and saturated

## 2024-08-21 NOTE — PROVIDER CONTACT NOTE (OTHER) - SITUATION
pt's dsg to posterior neck is soiled. pt is on PCA pump and c/o severe pain. RN reeducated pt on how to utilize pump, pt verbalized understanding.

## 2024-08-21 NOTE — PROGRESS NOTE ADULT - SUBJECTIVE AND OBJECTIVE BOX
VALENTINA LOBO  59y Male    CHIEF COMPLAINT:    Patient is a 59y old  Male who presents with a chief complaint of     INTERVAL HPI/OVERNIGHT EVENTS:    Patient seen and examined. No acute events overnight. complains of worsening pain today     ROS: All other systems are negative.    Vital Signs:    T(F): 99.3 (08-21-24 @ 10:31), Max: 99.8 (08-20-24 @ 20:46)  HR: 88 (08-21-24 @ 10:31) (73 - 95)  BP: 157/65 (08-21-24 @ 10:31) (140/56 - 196/90)  RR: 18 (08-21-24 @ 10:31) (18 - 18)  SpO2: 95% (08-21-24 @ 10:31) (94% - 96%)    20 Aug 2024 07:01  -  21 Aug 2024 07:00  --------------------------------------------------------  IN: 10 mL / OUT: 1310 mL / NET: -1300 mL    21 Aug 2024 07:01  -  21 Aug 2024 10:43  --------------------------------------------------------  IN: 290 mL / OUT: 300 mL / NET: -10 mL      PHYSICAL EXAM:    GENERAL:  NAD, uncomfortable due to pain, obese  SKIN: No rashes or lesions  HEENT: Atraumatic. Normocephalic.    NECK: Supple, No JVD. No lymphadenopathy.  PULMONARY: CTA B/L. No wheezing. No rales  CVS: Normal S1, S2. Rate and Rhythm are regular  ABDOMEN/GI: Soft, Nontender, Nondistended  MSK:  No clubbing or cyanosis   NEUROLOGIC: moves all extremities   PSYCH: Awake and alert     Consultant(s) Notes Reviewed:  [x ] YES  [ ] NO  Care Discussed with Consultants/Other Providers [ x] YES  [ ] NO    LABS:                        9.5    9.84  )-----------( 105      ( 21 Aug 2024 05:32 )             31.1     141  |  103  |  12  ----------------------------<  159<H>  3.8   |  22  |  0.8    Ca    8.2<L>      20 Aug 2024 06:00    RADIOLOGY & ADDITIONAL TESTS:  Imaging or report Personally Reviewed:  [x] YES  [ ] NO  EKG reviewed: [x] YES  [ ] NO    Medications:  Standing  diltiazem    milliGRAM(s) Oral daily  heparin   Injectable 5000 Unit(s) SubCutaneous every 8 hours  HYDROmorphone PCA (1 mG/mL) 30 milliLiter(s) PCA Continuous PCA Continuous  ketorolac   Injectable 30 milliGRAM(s) IV Push every 8 hours  levothyroxine 100 MICROGram(s) Oral daily  methocarbamol 750 milliGRAM(s) Oral every 6 hours  pantoprazole    Tablet 40 milliGRAM(s) Oral before breakfast  polyethylene glycol 3350 17 Gram(s) Oral daily  senna 2 Tablet(s) Oral at bedtime    PRN Meds

## 2024-08-21 NOTE — PROGRESS NOTE ADULT - ASSESSMENT
59 y.o.M with a hx of hypothyroidism, YANIRA (on CPAP), AFIB (on no anticoagulation), and sciatica presents for Cervical 1-4 Laminectomy and Fusion, POD 2. Hospitalist consulted for comanagement      Cervical 1-4 Laminectomy and Fusion, POD 2  - Pain is not controlled, discussed with team  - PT/OT following   - Management per surgery     Thrombocytopenia - plts 105 today, baseline mid-upper 100s  - monitor plts    Atrial fibrillation- c/w cardizem, off AC      Hypothyroidism - c/w synthroid     H/o pericardial effusion - mild-mod on CT. Prior records reveiwed, same findings in 2021, patient follows with Dr perez, had a negative workup reportedly. Recently saw Dr Perez. Asymptomatic     Sleep apnea with use of continuous positive airway pressure (CPAP)    Discussed with patient and team       Patient seen at bedside, total time spent to evaluate and treat the patient's acute illness and chronic medical conditions as well as time spent reviewing prior records, labs, radiology, documenting in electronic medical records,  discussing medical plan with   medical team was more than 35 minutes with >50% of time spent face to face with patient, discussing with patient/family as well as coordination of care

## 2024-08-21 NOTE — PHYSICAL THERAPY INITIAL EVALUATION ADULT - GENERAL OBSERVATIONS, REHAB EVAL
Patient declined participating OOB transfer with PT at this time due to severe pain. Patient was educated on importance of OOB activities and participating in early ambulation. However patient continued to decline. PT assisted the patient to reposition in bed. PT will f/u when appropriate.
8:40-9:10; Pt encountered in b/s chair; agreeable to PT with encouragement. + PCA pump.

## 2024-08-21 NOTE — PROVIDER CONTACT NOTE (OTHER) - SITUATION
pt is c/o pain, stating PCA pump is not helping, since 630AM pt hit pump 21 times. RN helped to readjust pt and got him oob, also dsg to neck is soiled, RN reenforced dressing. pt is refusing q6 hour pt is c/o pain, stating PCA pump is not helping, since 630AM pt hit pump 21 times. RN helped to readjust pt and got him oob, also dsg to neck is soiled, RN reenforced dressing. sangeromain barajasd pt is c/o pain, stating PCA pump is not helping, since 630AM pt hit pump 21 times. RN helped to readjust pt and got him oob, also dsg to neck is soiled, RN reenforced dressing. sangeous fluid

## 2024-08-21 NOTE — PROGRESS NOTE ADULT - SUBJECTIVE AND OBJECTIVE BOX
POD # 2     S/P  Posterior C4-7 laminectomy & fusion      pt seen and examined at bedside pt is alert, has some c/o incisional pain ,       Vital Signs Last 24 Hrs  T(C): 37.4 (21 Aug 2024 10:31), Max: 37.7 (20 Aug 2024 20:46)  T(F): 99.3 (21 Aug 2024 10:31), Max: 99.8 (20 Aug 2024 20:46)  HR: 88 (21 Aug 2024 10:31) (73 - 88)  BP: 157/65 (21 Aug 2024 10:31) (140/56 - 180/71)  BP(mean): 98 (21 Aug 2024 00:01) (98 - 98)  RR: 18 (21 Aug 2024 10:31) (18 - 18)  SpO2: 95% (21 Aug 2024 10:31) (94% - 96%)    Parameters below as of 21 Aug 2024 10:31  Patient On (Oxygen Delivery Method): room air        PHYSICAL EXAM:    Alert, Follows commands   MAEX4   MS RUE 5/5         LUE 5-/5         RLE 5/5         LLE 5/5   Neg hoffmans   HVC 10 cc       HVC d/c'd without complications covered with steri strips new dressing placed         MEDICATIONS:  Antibiotics:    Neuro:  HYDROmorphone   Tablet 2 milliGRAM(s) Oral every 4 hours PRN  HYDROmorphone  Injectable 1 milliGRAM(s) IV Push every 4 hours PRN  HYDROmorphone PCA (1 mG/mL) 30 milliLiter(s) PCA Continuous PCA Continuous  ketorolac   Injectable 30 milliGRAM(s) IV Push every 8 hours  methocarbamol 750 milliGRAM(s) Oral every 6 hours    Anticoagulation:  heparin   Injectable 5000 Unit(s) SubCutaneous every 8 hours    OTHER:  chlorhexidine 2% Cloths 1 Application(s) Topical <User Schedule>  diltiazem    milliGRAM(s) Oral daily  levothyroxine 100 MICROGram(s) Oral daily  pantoprazole    Tablet 40 milliGRAM(s) Oral before breakfast  polyethylene glycol 3350 17 Gram(s) Oral daily  senna 2 Tablet(s) Oral at bedtime    IVF:        LABS:                        9.5    9.84  )-----------( 105      ( 21 Aug 2024 05:32 )             31.1     08-20    141  |  103  |  12  ----------------------------<  159<H>  3.8   |  22  |  0.8    Ca    8.2<L>      20 Aug 2024 06:00        Urinalysis Basic - ( 20 Aug 2024 06:00 )    Color: x / Appearance: x / SG: x / pH: x  Gluc: 159 mg/dL / Ketone: x  / Bili: x / Urobili: x   Blood: x / Protein: x / Nitrite: x   Leuk Esterase: x / RBC: x / WBC x   Sq Epi: x / Non Sq Epi: x / Bacteria: x        A/p               S/P  Posterior C4-7 laminectomy & fusion                     DC PCA pump                      DC HVC                      Start PO pain meds                      incentive spirometry                      PT/OT

## 2024-08-22 LAB
BASOPHILS # BLD AUTO: 0.07 K/UL — SIGNIFICANT CHANGE UP (ref 0–0.2)
BASOPHILS NFR BLD AUTO: 0.8 % — SIGNIFICANT CHANGE UP (ref 0–1)
EOSINOPHIL # BLD AUTO: 0.25 K/UL — SIGNIFICANT CHANGE UP (ref 0–0.7)
EOSINOPHIL NFR BLD AUTO: 3 % — SIGNIFICANT CHANGE UP (ref 0–8)
HCT VFR BLD CALC: 29.3 % — LOW (ref 42–52)
HGB BLD-MCNC: 9.1 G/DL — LOW (ref 14–18)
IMM GRANULOCYTES NFR BLD AUTO: 0.8 % — HIGH (ref 0.1–0.3)
LYMPHOCYTES # BLD AUTO: 1.47 K/UL — SIGNIFICANT CHANGE UP (ref 1.2–3.4)
LYMPHOCYTES # BLD AUTO: 17.5 % — LOW (ref 20.5–51.1)
MCHC RBC-ENTMCNC: 21.1 PG — LOW (ref 27–31)
MCHC RBC-ENTMCNC: 31.1 G/DL — LOW (ref 32–37)
MCV RBC AUTO: 67.8 FL — LOW (ref 80–94)
MONOCYTES # BLD AUTO: 0.64 K/UL — HIGH (ref 0.1–0.6)
MONOCYTES NFR BLD AUTO: 7.6 % — SIGNIFICANT CHANGE UP (ref 1.7–9.3)
NEUTROPHILS # BLD AUTO: 5.9 K/UL — SIGNIFICANT CHANGE UP (ref 1.4–6.5)
NEUTROPHILS NFR BLD AUTO: 70.3 % — SIGNIFICANT CHANGE UP (ref 42.2–75.2)
NRBC # BLD: 0 /100 WBCS — SIGNIFICANT CHANGE UP (ref 0–0)
PLATELET # BLD AUTO: 119 K/UL — LOW (ref 130–400)
PMV BLD: 11 FL — HIGH (ref 7.4–10.4)
RBC # BLD: 4.32 M/UL — LOW (ref 4.7–6.1)
RBC # FLD: 16.3 % — HIGH (ref 11.5–14.5)
WBC # BLD: 8.4 K/UL — SIGNIFICANT CHANGE UP (ref 4.8–10.8)
WBC # FLD AUTO: 8.4 K/UL — SIGNIFICANT CHANGE UP (ref 4.8–10.8)

## 2024-08-22 PROCEDURE — 99232 SBSQ HOSP IP/OBS MODERATE 35: CPT

## 2024-08-22 RX ORDER — GABAPENTIN 100 MG
300 CAPSULE ORAL EVERY 8 HOURS
Refills: 0 | Status: DISCONTINUED | OUTPATIENT
Start: 2024-08-22 | End: 2024-08-27

## 2024-08-22 RX ORDER — DIAZEPAM 10 MG
2 TABLET ORAL ONCE
Refills: 0 | Status: DISCONTINUED | OUTPATIENT
Start: 2024-08-22 | End: 2024-08-22

## 2024-08-22 RX ORDER — DIAZEPAM 10 MG
10 TABLET ORAL EVERY 8 HOURS
Refills: 0 | Status: DISCONTINUED | OUTPATIENT
Start: 2024-08-22 | End: 2024-08-27

## 2024-08-22 RX ORDER — ACETAMINOPHEN 325 MG/1
975 TABLET ORAL EVERY 8 HOURS
Refills: 0 | Status: DISCONTINUED | OUTPATIENT
Start: 2024-08-22 | End: 2024-08-27

## 2024-08-22 RX ORDER — HYDROMORPHONE HYDROCHLORIDE 2 MG/1
1 TABLET ORAL ONCE
Refills: 0 | Status: DISCONTINUED | OUTPATIENT
Start: 2024-08-22 | End: 2024-08-22

## 2024-08-22 RX ADMIN — HYDROMORPHONE HYDROCHLORIDE 1 MILLIGRAM(S): 2 TABLET ORAL at 12:27

## 2024-08-22 RX ADMIN — METHOCARBAMOL 750 MILLIGRAM(S): 750 TABLET, FILM COATED ORAL at 11:06

## 2024-08-22 RX ADMIN — ACETAMINOPHEN 975 MILLIGRAM(S): 325 TABLET ORAL at 12:27

## 2024-08-22 RX ADMIN — HYDROMORPHONE HYDROCHLORIDE 1 MILLIGRAM(S): 2 TABLET ORAL at 18:06

## 2024-08-22 RX ADMIN — Medication 10 MILLIGRAM(S): at 14:20

## 2024-08-22 RX ADMIN — KETOROLAC TROMETHAMINE 30 MILLIGRAM(S): 30 INJECTION, SOLUTION INTRAMUSCULAR at 05:01

## 2024-08-22 RX ADMIN — METHOCARBAMOL 750 MILLIGRAM(S): 750 TABLET, FILM COATED ORAL at 06:07

## 2024-08-22 RX ADMIN — HYDROMORPHONE HYDROCHLORIDE 1 MILLIGRAM(S): 2 TABLET ORAL at 23:29

## 2024-08-22 RX ADMIN — Medication 10 MILLIGRAM(S): at 21:11

## 2024-08-22 RX ADMIN — Medication 100 MICROGRAM(S): at 05:02

## 2024-08-22 RX ADMIN — CHLORHEXIDINE GLUCONATE 1 APPLICATION(S): 40 SOLUTION TOPICAL at 06:10

## 2024-08-22 RX ADMIN — HYDROMORPHONE HYDROCHLORIDE 2 MILLIGRAM(S): 2 TABLET ORAL at 10:54

## 2024-08-22 RX ADMIN — ACETAMINOPHEN 975 MILLIGRAM(S): 325 TABLET ORAL at 11:57

## 2024-08-22 RX ADMIN — KETOROLAC TROMETHAMINE 30 MILLIGRAM(S): 30 INJECTION, SOLUTION INTRAMUSCULAR at 14:20

## 2024-08-22 RX ADMIN — HYDROMORPHONE HYDROCHLORIDE 1 MILLIGRAM(S): 2 TABLET ORAL at 17:46

## 2024-08-22 RX ADMIN — ACETAMINOPHEN 975 MILLIGRAM(S): 325 TABLET ORAL at 21:11

## 2024-08-22 RX ADMIN — HYDROMORPHONE HYDROCHLORIDE 2 MILLIGRAM(S): 2 TABLET ORAL at 06:07

## 2024-08-22 RX ADMIN — HYDROMORPHONE HYDROCHLORIDE 1 MILLIGRAM(S): 2 TABLET ORAL at 23:59

## 2024-08-22 RX ADMIN — HYDROMORPHONE HYDROCHLORIDE 1 MILLIGRAM(S): 2 TABLET ORAL at 11:58

## 2024-08-22 RX ADMIN — ONDANSETRON 4 MILLIGRAM(S): 2 INJECTION, SOLUTION INTRAMUSCULAR; INTRAVENOUS at 11:07

## 2024-08-22 RX ADMIN — HYDROMORPHONE HYDROCHLORIDE 1 MILLIGRAM(S): 2 TABLET ORAL at 08:39

## 2024-08-22 RX ADMIN — HYDROMORPHONE HYDROCHLORIDE 2 MILLIGRAM(S): 2 TABLET ORAL at 10:24

## 2024-08-22 RX ADMIN — Medication 5000 UNIT(S): at 14:21

## 2024-08-22 RX ADMIN — Medication 40 MILLIGRAM(S): at 05:01

## 2024-08-22 RX ADMIN — HYDROMORPHONE HYDROCHLORIDE 1 MILLIGRAM(S): 2 TABLET ORAL at 04:00

## 2024-08-22 RX ADMIN — Medication 300 MILLIGRAM(S): at 11:57

## 2024-08-22 RX ADMIN — HYDROMORPHONE HYDROCHLORIDE 1 MILLIGRAM(S): 2 TABLET ORAL at 08:54

## 2024-08-22 RX ADMIN — Medication 5 MILLIGRAM(S): at 05:01

## 2024-08-22 RX ADMIN — ONDANSETRON 4 MILLIGRAM(S): 2 INJECTION, SOLUTION INTRAMUSCULAR; INTRAVENOUS at 17:46

## 2024-08-22 RX ADMIN — Medication 300 MILLIGRAM(S): at 21:11

## 2024-08-22 RX ADMIN — POLYETHYLENE GLYCOL 3350 17 GRAM(S): 17 POWDER, FOR SOLUTION ORAL at 11:07

## 2024-08-22 RX ADMIN — Medication 2 TABLET(S): at 21:11

## 2024-08-22 RX ADMIN — ONDANSETRON 4 MILLIGRAM(S): 2 INJECTION, SOLUTION INTRAMUSCULAR; INTRAVENOUS at 23:28

## 2024-08-22 RX ADMIN — HYDROMORPHONE HYDROCHLORIDE 1 MILLIGRAM(S): 2 TABLET ORAL at 03:24

## 2024-08-22 RX ADMIN — DILTIAZEM HYDROCHLORIDE 120 MILLIGRAM(S): 5 INJECTION INTRAVENOUS at 05:02

## 2024-08-22 RX ADMIN — Medication 5000 UNIT(S): at 05:01

## 2024-08-22 RX ADMIN — Medication 5000 UNIT(S): at 21:11

## 2024-08-22 NOTE — OCCUPATIONAL THERAPY INITIAL EVALUATION ADULT - PERTINENT HX OF CURRENT PROBLEM, REHAB EVAL
59 y.o.M with a hx of hypothyroidism, YANIRA (on CPAP), AFIB (on no anticoagulation), and sciatica presents for CERVICAL 1-5 LAMINECTOMY INSTRUMENTATION AND FUSION. He admits he has neck pain x 1.5 years associated with -FROM that radiates down midline of spine. He admits he is prescribed medical marijuana for pain relief. He denies taking any OTC medications for pain relief, trauma, recent falls, confusion, memory loss, stroke, tics, tremors, vision changes, swelling, discoloration, inability to ambulate,  numbness/tingling, urinary incontinence/changes, abdominal pain, and N/V/D.   PATIENT/GUARDIAN CURRENTLY DENIES CHEST PAIN  SHORTNESS OF BREATH  PALPITATIONS,  DYSURIA, OR UPPER RESPIRATORY INFECTION IN PAST 2 WEEKS

## 2024-08-22 NOTE — OCCUPATIONAL THERAPY INITIAL EVALUATION ADULT - ADL RETRAINING, OT EVAL
Pt will be educated on adapted technique and AE to perform LB dressing with modified independence by d/c

## 2024-08-22 NOTE — PROGRESS NOTE ADULT - ASSESSMENT
59 y.o.M with a hx of hypothyroidism, YANIRA (on CPAP), AFIB (on no anticoagulation), and sciatica presents for Cervical 1-4 Laminectomy and Fusion, POD 3. Hospitalist consulted for comanagement      Cervical 1-4 Laminectomy and Fusion, POD 3  - Pain is not controlled, discussed with team  - PT/OT following   - Management per surgery   - recommend pain management eval     Thrombocytopenia - improving.  baseline mid-upper 100s  - monitor plts    Atrial fibrillation- c/w cardizem, off AC      Hypothyroidism - c/w synthroid     H/o pericardial effusion - mild-mod on CT. Prior records reviewed same findings in 2021, patient follows with Dr perez, had a negative workup reportedly. Recently saw Dr Perez. Asymptomatic     Sleep apnea with use of continuous positive airway pressure (CPAP)    Discussed with patient and team     Patient seen at bedside, total time spent to evaluate and treat the patient's acute illness and chronic medical conditions as well as time spent reviewing prior records, labs, radiology, documenting in electronic medical records,  discussing medical plan with   medical team was more than 35 minutes with >50% of time spent face to face with patient, discussing with patient/family as well as coordination of care

## 2024-08-22 NOTE — OCCUPATIONAL THERAPY INITIAL EVALUATION ADULT - NSACTIVITYREC_GEN_A_OT
Pt requires assistance with activities of daily living. OT recommends d/c to rehabilitation facility vs home with assistance pending progress when medically appropriate. Refer to evaluation/treatment for details.

## 2024-08-22 NOTE — OCCUPATIONAL THERAPY INITIAL EVALUATION ADULT - GENERAL OBSERVATIONS, REHAB EVAL
Pt encountered in bed + IV lock. Pt returned to bedside recliner + IV lock + cold pack to cervical region. Pt report 10/10 pain that increases throughout assessment. CHANEL Agosto notified

## 2024-08-22 NOTE — PROVIDER CONTACT NOTE (OTHER) - SITUATION
pt had previously told NR pain had resolved. pt got up and went to the bathroom, no c/o severe pain 10/10. pt is not due for any medications, he received 2mg PO Dilaudid at 1024, Robaxin and IVP pt had previously told RN pain had resolved. pt got up and went to the bathroom, no c/o severe pain 10/10. pt is not due for any medications, he received 2mg PO Dilaudid at 1024, Robaxin and IVP

## 2024-08-22 NOTE — OCCUPATIONAL THERAPY INITIAL EVALUATION ADULT - TRANSFER TRAINING, PT EVAL
Pt will perform sit<>stand, bed<>chair, and toilet transfer using most appropriate AD with CGA by d/c

## 2024-08-22 NOTE — PROGRESS NOTE ADULT - SUBJECTIVE AND OBJECTIVE BOX
VALENTINA LOBO  59y Male    CHIEF COMPLAINT:    Patient is a 59y old  Male who presents with a chief complaint of     INTERVAL HPI/OVERNIGHT EVENTS:    Patient seen and examined. No acute events overnight. Still complaining of severe neck pain     ROS: All other systems are negative.    Vital Signs:    T(F): 98.6 (08-22-24 @ 16:00), Max: 99.9 (08-21-24 @ 23:58)  HR: 74 (08-22-24 @ 16:00) (74 - 80)  BP: 146/74 (08-22-24 @ 16:00) (124/66 - 175/78)  RR: 18 (08-22-24 @ 16:00) (18 - 18)  SpO2: 95% (08-22-24 @ 16:00) (92% - 95%)    21 Aug 2024 07:01  -  22 Aug 2024 07:00  --------------------------------------------------------  IN: 1000 mL / OUT: 1050 mL / NET: -50 mL    22 Aug 2024 07:01  -  22 Aug 2024 17:04  --------------------------------------------------------  IN: 750 mL / OUT: 650 mL / NET: 100 mL    PHYSICAL EXAM:    GENERAL: unconfortable due to pain, obese   SKIN: No rashes or lesions  HEENT: Atraumatic. Normocephalic.   NECK: Supple, No JVD.   PULMONARY: CTA B/L. No wheezing. No rales  CVS: Normal S1, S2. Rate and Rhythm are regular.    ABDOMEN/GI: Soft, Nontender,  distended; BS present  MSK:  No clubbing or cyanosis   NEUROLOGIC: moves all extremities   PSYCH: Alert & oriented x 3, normal affect    Consultant(s) Notes Reviewed:  [x ] YES  [ ] NO  Care Discussed with Consultants/Other Providers [ x] YES  [ ] NO    LABS:                        9.1    8.40  )-----------( 119      ( 22 Aug 2024 05:06 )             29.3     RADIOLOGY & ADDITIONAL TESTS:  Imaging or report Personally Reviewed:  [x] YES  [ ] NO  EKG reviewed: [x] YES  [ ] NO    Medications:  Standing  acetaminophen     Tablet .. 975 milliGRAM(s) Oral every 8 hours  chlorhexidine 2% Cloths 1 Application(s) Topical <User Schedule>  diazepam    Tablet 10 milliGRAM(s) Oral every 8 hours  diltiazem    milliGRAM(s) Oral daily  gabapentin 300 milliGRAM(s) Oral every 8 hours  heparin   Injectable 5000 Unit(s) SubCutaneous every 8 hours  levothyroxine 100 MICROGram(s) Oral daily  ondansetron Injectable 4 milliGRAM(s) IV Push every 6 hours  pantoprazole    Tablet 40 milliGRAM(s) Oral before breakfast  polyethylene glycol 3350 17 Gram(s) Oral daily  senna 2 Tablet(s) Oral at bedtime    PRN Meds  aluminum hydroxide/magnesium hydroxide/simethicone Suspension 30 milliLiter(s) Oral every 4 hours PRN  HYDROmorphone  Injectable 1 milliGRAM(s) IV Push every 4 hours PRN

## 2024-08-22 NOTE — PROVIDER CONTACT NOTE (MEDICATION) - SITUATION
pt refused zofran at this time despite teaching and education, pt stated he has no nausea.
pt is due for a dose of tylenol and gabapentin at this time, stat doses were given at 1157, RN holding these doses.

## 2024-08-22 NOTE — PROGRESS NOTE ADULT - SUBJECTIVE AND OBJECTIVE BOX
POD#3    S/P C1-4 Posterior Laminectomy and Fusion    Pt seen and examined at bedside. Pt c/o incisonal/spasmodic type pain and well as pain into his upper extremities. Denies BM. + Flatus    Vital Signs Last 24 Hrs  T(C): 36.9 (22 Aug 2024 08:29), Max: 37.7 (21 Aug 2024 23:58)  T(F): 98.4 (22 Aug 2024 08:29), Max: 99.9 (21 Aug 2024 23:58)  HR: 78 (22 Aug 2024 08:29) (74 - 88)  BP: 124/66 (22 Aug 2024 08:29) (124/66 - 175/78)  BP(mean): 92 (21 Aug 2024 20:42) (92 - 110)  RR: 18 (22 Aug 2024 08:29) (18 - 18)  SpO2: 95% (22 Aug 2024 08:29) (92% - 97%)    Parameters below as of 22 Aug 2024 08:29  Patient On (Oxygen Delivery Method): room air        I&O's Detail    21 Aug 2024 07:01  -  22 Aug 2024 07:00  --------------------------------------------------------  IN:    IV PiggyBack: 50 mL    Oral Fluid: 950 mL  Total IN: 1000 mL    OUT:    Voided (mL): 1050 mL  Total OUT: 1050 mL    Total NET: -50 mL      22 Aug 2024 07:01  -  22 Aug 2024 09:54  --------------------------------------------------------  IN:    Oral Fluid: 250 mL  Total IN: 250 mL    OUT:  Total OUT: 0 mL    Total NET: 250 mL        I&O's Summary    21 Aug 2024 07:01  -  22 Aug 2024 07:00  --------------------------------------------------------  IN: 1000 mL / OUT: 1050 mL / NET: -50 mL    22 Aug 2024 07:01  -  22 Aug 2024 09:54  --------------------------------------------------------  IN: 250 mL / OUT: 0 mL / NET: 250 mL        REVIEW OF SYSTEMS    [X] A ten-point review of systems was otherwise negative except as noted.  [ ] Due to altered mental status/intubation, subjective information were not able to be obtained from the patient. History was obtained, to the extent possible, from review of the chart and collateral sources of information.      PHYSICAL EXAM:  Neurological:  Strength 5/5 B/L UE  Sensation intact to light touch  No hoffmans  BB 1+ B/L  Incision C/D/I - dressing removed  Ice Packs placed    LABS:                        9.1    8.40  )-----------( 119      ( 22 Aug 2024 05:06 )             29.3       Allergies    Allergy Status Unknown    Intolerances    morphine (Other; Pruritus)    MEDICATIONS:  Antibiotics:    Neuro:  diazepam    Tablet 5 milliGRAM(s) Oral every 8 hours  HYDROmorphone   Tablet 2 milliGRAM(s) Oral every 4 hours PRN  HYDROmorphone  Injectable 1 milliGRAM(s) IV Push every 4 hours PRN  ketorolac   Injectable 30 milliGRAM(s) IV Push every 8 hours  methocarbamol 750 milliGRAM(s) Oral every 6 hours  ondansetron Injectable 4 milliGRAM(s) IV Push every 6 hours      ASSESSMENT:  59y Male s/p    Cervical radiculopathy    Body mass index [BMI] 36.0-36.9, adult    Body mass index [BMI] 37.0-37.9, adult    Body mass index [BMI] 38.0-38.9, adult    Body mass index [BMI] 39.0-39.9, adult    Body mass index [BMI] 40.0-44.9, adult    Dorsalgia, unspecified    Elevation of levels of liver transaminase levels    Essential (primary) hypertension    Generalized anxiety disorder    Hypothyroidism, unspecified    Low back pain, unspecified    Nonalcoholic steatohepatitis (GALLEGOS)    Obesity, unspecified    Obstructive sleep apnea (adult) (pediatric)    Other malaise    Pain in right hip    Panic disorder [episodic paroxysmal anxiety]    Prediabetes    Simple chronic bronchitis    Unspecified atrial fibrillation    Unspecified cord compression    Weakness    Alcohol history    Has the patient ever used illegal drugs?    Number of children    No pertinent family history in first degree relatives    Family history of colon cancer in father (Father)    Handoff    MEWS Score    Sleep apnea with use of continuous positive airway pressure (CPAP)    Atrial fibrillation, unspecified type    Sciatica    Hypothyroidism    Cervical spondylosis with myelopathy    Cervical spondylosis with myelopathy    Laminectomy, cervical, with foraminotomy or facetectomy, 1 level    Fusion, spine, cervical, 4 levels, posterior approach    No significant past surgical history    H/O colonoscopy    History of endoscopy    History of ear surgery    [id number]; [id number]; [id number]; [id number]    [email or web address]        PLAN:  D/C Bladder scans - pt voiding  BM check  PT/OT  Pain Meds PRN  SW for D/C Planning

## 2024-08-22 NOTE — PROVIDER CONTACT NOTE (OTHER) - SITUATION
dsg was removed to patient's posterior neck by team , wound is leaking between sutures, can Rn place new dressing? dsg was removed to patient's posterior neck by team , wound is leaking between sutures, serosanguineous fluid, can Rn place new dressing?

## 2024-08-22 NOTE — OCCUPATIONAL THERAPY INITIAL EVALUATION ADULT - ADDITIONAL COMMENTS
Pt lives with wife and 2 daughters + FOS to enter + FOS to bed/bath + tub + standard toilet + pets  +

## 2024-08-23 PROCEDURE — 99232 SBSQ HOSP IP/OBS MODERATE 35: CPT

## 2024-08-23 RX ORDER — LIDOCAINE/BENZALKONIUM/ALCOHOL
2 SOLUTION, NON-ORAL TOPICAL DAILY
Refills: 0 | Status: DISCONTINUED | OUTPATIENT
Start: 2024-08-23 | End: 2024-08-27

## 2024-08-23 RX ORDER — HYDROMORPHONE HYDROCHLORIDE 2 MG/1
2 TABLET ORAL EVERY 6 HOURS
Refills: 0 | Status: DISCONTINUED | OUTPATIENT
Start: 2024-08-23 | End: 2024-08-27

## 2024-08-23 RX ORDER — KETOROLAC TROMETHAMINE 30 MG/ML
30 INJECTION, SOLUTION INTRAMUSCULAR EVERY 8 HOURS
Refills: 0 | Status: DISCONTINUED | OUTPATIENT
Start: 2024-08-23 | End: 2024-08-24

## 2024-08-23 RX ADMIN — HYDROMORPHONE HYDROCHLORIDE 1 MILLIGRAM(S): 2 TABLET ORAL at 04:21

## 2024-08-23 RX ADMIN — Medication 5000 UNIT(S): at 05:20

## 2024-08-23 RX ADMIN — Medication 10 MILLIGRAM(S): at 21:09

## 2024-08-23 RX ADMIN — Medication 300 MILLIGRAM(S): at 05:25

## 2024-08-23 RX ADMIN — KETOROLAC TROMETHAMINE 30 MILLIGRAM(S): 30 INJECTION, SOLUTION INTRAMUSCULAR at 19:40

## 2024-08-23 RX ADMIN — KETOROLAC TROMETHAMINE 30 MILLIGRAM(S): 30 INJECTION, SOLUTION INTRAMUSCULAR at 20:10

## 2024-08-23 RX ADMIN — Medication 300 MILLIGRAM(S): at 13:33

## 2024-08-23 RX ADMIN — HYDROMORPHONE HYDROCHLORIDE 2 MILLIGRAM(S): 2 TABLET ORAL at 16:11

## 2024-08-23 RX ADMIN — HYDROMORPHONE HYDROCHLORIDE 1 MILLIGRAM(S): 2 TABLET ORAL at 03:51

## 2024-08-23 RX ADMIN — Medication 2 TABLET(S): at 21:09

## 2024-08-23 RX ADMIN — HYDROMORPHONE HYDROCHLORIDE 1 MILLIGRAM(S): 2 TABLET ORAL at 23:32

## 2024-08-23 RX ADMIN — ACETAMINOPHEN 975 MILLIGRAM(S): 325 TABLET ORAL at 21:09

## 2024-08-23 RX ADMIN — Medication 100 MICROGRAM(S): at 05:25

## 2024-08-23 RX ADMIN — Medication 5000 UNIT(S): at 21:10

## 2024-08-23 RX ADMIN — ONDANSETRON 4 MILLIGRAM(S): 2 INJECTION, SOLUTION INTRAMUSCULAR; INTRAVENOUS at 23:32

## 2024-08-23 RX ADMIN — HYDROMORPHONE HYDROCHLORIDE 1 MILLIGRAM(S): 2 TABLET ORAL at 13:34

## 2024-08-23 RX ADMIN — Medication 40 MILLIGRAM(S): at 05:22

## 2024-08-23 RX ADMIN — ACETAMINOPHEN 975 MILLIGRAM(S): 325 TABLET ORAL at 13:33

## 2024-08-23 RX ADMIN — Medication 10 MILLIGRAM(S): at 13:32

## 2024-08-23 RX ADMIN — ACETAMINOPHEN 975 MILLIGRAM(S): 325 TABLET ORAL at 05:22

## 2024-08-23 RX ADMIN — HYDROMORPHONE HYDROCHLORIDE 1 MILLIGRAM(S): 2 TABLET ORAL at 17:53

## 2024-08-23 RX ADMIN — HYDROMORPHONE HYDROCHLORIDE 1 MILLIGRAM(S): 2 TABLET ORAL at 14:04

## 2024-08-23 RX ADMIN — DILTIAZEM HYDROCHLORIDE 120 MILLIGRAM(S): 5 INJECTION INTRAVENOUS at 05:26

## 2024-08-23 RX ADMIN — HYDROMORPHONE HYDROCHLORIDE 1 MILLIGRAM(S): 2 TABLET ORAL at 10:05

## 2024-08-23 RX ADMIN — HYDROMORPHONE HYDROCHLORIDE 1 MILLIGRAM(S): 2 TABLET ORAL at 09:35

## 2024-08-23 RX ADMIN — Medication 10 MILLIGRAM(S): at 05:25

## 2024-08-23 RX ADMIN — HYDROMORPHONE HYDROCHLORIDE 1 MILLIGRAM(S): 2 TABLET ORAL at 18:23

## 2024-08-23 RX ADMIN — CHLORHEXIDINE GLUCONATE 1 APPLICATION(S): 40 SOLUTION TOPICAL at 05:30

## 2024-08-23 RX ADMIN — Medication 300 MILLIGRAM(S): at 21:09

## 2024-08-23 RX ADMIN — ONDANSETRON 4 MILLIGRAM(S): 2 INJECTION, SOLUTION INTRAMUSCULAR; INTRAVENOUS at 05:20

## 2024-08-23 NOTE — CONSULT NOTE ADULT - NSCONSULTADDITIONALINFOA_GEN_ALL_CORE
Search Terms: rey cutler, 1965Search Date: 08/23/2024 09:18:37 AM  The Drug Utilization Report below displays all of the controlled substance prescriptions, if any, that your patient has filled in the last twelve months. The information displayed on this report is compiled from pharmacy submissions to the Department, and accurately reflects the information as submitted by the pharmacies.    This report was requested by: Quyen Escalante | Reference #: [id number]    You have not added a LU number. Keeping your LU number(s) up to date on the My LU # tab will enable the separation of your prescriptions from others in the search results.    Practitioner Count: 2  Pharmacy Count: 3  Current Opioid Prescriptions: 0  Current Benzodiazepine Prescriptions: 1  Current Stimulant Prescriptions: 0      Patient Demographic Information (PDI)       PDI	First Name	Last Name	Birth Date	Gender	Street Address	[facility]  A	Rey Cutler	1965	[facility]	[address]	[address]  B	Rey Cutler	1965	Male	[address] [facility] [address]	[id number]    Prescription Information      PDI Filter:    PDI	Current Rx	Drug Type	Rx Written	Rx Dispensed	Drug	Quantity	Days Supply	Prescriber Name	Prescriber LU #	Payment Method	Dispenser  B	Y	B	07/31/2024	08/03/2024	diazepam 10 mg tablet	60	30	Sy Guerrero MD	[id number]	Insurance	[id number]  A	N		03/16/2024	06/15/2024	ccny whole flower 5.1mg - 5.6mg thc:<0.5mg cbd/inh	8	16	Cecy Valentino [facility]	[id number]	[place]una	[employer] [facility]  B	N		03/16/2024	08/08/2024	curaleaf whole flower 2.1mg ? 3.0mg thc:<0.5mg cbd/inh	1	4	Cecy Valentino-B[facility]	[id number]	[employer]	[facility]  B	N		03/16/2024	08/08/2024	curaleaf hybrid 75% (20:1) 2.5mg thc/0.125mg cbd/dose vape	1	3	Cecy Valentino FNP-Bc	[id number]	[employer]ash	[facility]  B	N		03/16/2024	08/07/2024	curaleaf hybrid 75% (20:1) 2.5mg thc/0.125mg cbd/dose vape	1	3	Cecy Valentino-Bc	[id number]	[employer] [facility]  B	N		03/16/2024	06/08/2024	etain whole flower 4.1mg ? 5.0mg thc:<0.5mg cbd/inh	1	3	Cecy Valentino-B[facility]	[id number]	Cash	[employer] [facility]   B	N		03/16/2024	05/18/2024	ccny whole flower 5.7mg ? 7.1mg thc:<0.5mg cbd/inh	3	6	Cecy Valentino-Bc	[id number]	Cash	[employer] [facility]  B	N		03/16/2024	05/03/2024	ccny whole flower 5.7mg ? 7.1mg thc:<0.5mg cbd/inh	4	8	Cecy Valentino [facility]	[id number]	Cash	[employer] [facility]  B	N		03/16/2024	05/03/2024	theraceed disposable 4mg thc, 0.2mg cbd/3 sec inh	1	3	Cecy Valentino [facility]	[id number]	[facility]

## 2024-08-23 NOTE — PROGRESS NOTE ADULT - SUBJECTIVE AND OBJECTIVE BOX
NEUROSURGERY NOTE  VALENTINA LOBO   08-23-24 @ 10:35    PAST 24HR EVENTS:  POD#4 s/p Posterior C1-C4 Laminectomy and Fusion   Patient seen and examined at bedside. States that his pain was better controlled last night, seen by pain management today. Noted to continue leakage at superior portion of incision. Continues to have neck pain radiating to b/l deltoids. Otherwise passing gas, but denies BM yet.     HPI: 59y Male     PHYSICAL EXAM:  Vital Signs Last 24 Hrs  T(C): 36.8 (23 Aug 2024 08:22), Max: 37.4 (22 Aug 2024 23:18)  T(F): 98.3 (23 Aug 2024 08:22), Max: 99.3 (22 Aug 2024 23:18)  HR: 70 (23 Aug 2024 08:22) (70 - 79)  BP: 156/74 (23 Aug 2024 08:22) (146/74 - 164/86)  BP(mean): --  RR: 18 (23 Aug 2024 08:22) (18 - 18)  SpO2: 96% (23 Aug 2024 08:22) (95% - 96%)    Parameters below as of 22 Aug 2024 16:00  Patient On (Oxygen Delivery Method): room air      I&O's Detail    22 Aug 2024 07:01  -  23 Aug 2024 07:00  --------------------------------------------------------  IN:    Oral Fluid: 1300 mL  Total IN: 1300 mL    OUT:    Voided (mL): 950 mL  Total OUT: 950 mL    Total NET: 350 mL        I&O's Summary    22 Aug 2024 07:01  -  23 Aug 2024 07:00  --------------------------------------------------------  IN: 1300 mL / OUT: 950 mL / NET: 350 mL      Awake, alert, following commands   B/L UE 5/5   B/L LE 5/5   JANESSA denson  Incision: leakage noted to superior portion of incision otherwise intact      MEDS:   acetaminophen     Tablet .. 975 milliGRAM(s) Oral every 8 hours  aluminum hydroxide/magnesium hydroxide/simethicone Suspension 30 milliLiter(s) Oral every 4 hours PRN  chlorhexidine 2% Cloths 1 Application(s) Topical <User Schedule>  diazepam    Tablet 10 milliGRAM(s) Oral every 8 hours  diltiazem    milliGRAM(s) Oral daily  gabapentin 300 milliGRAM(s) Oral every 8 hours  heparin   Injectable 5000 Unit(s) SubCutaneous every 8 hours  HYDROmorphone   Tablet 2 milliGRAM(s) Oral every 6 hours PRN  HYDROmorphone  Injectable 1 milliGRAM(s) IV Push every 4 hours PRN  levothyroxine 100 MICROGram(s) Oral daily  ondansetron Injectable 4 milliGRAM(s) IV Push every 6 hours  pantoprazole    Tablet 40 milliGRAM(s) Oral before breakfast  polyethylene glycol 3350 17 Gram(s) Oral daily  senna 2 Tablet(s) Oral at bedtime      LABS:                        9.1    8.40  )-----------( 119      ( 22 Aug 2024 05:06 )             29.3               RADIOLOGY:

## 2024-08-23 NOTE — CONSULT NOTE ADULT - ASSESSMENT
A/P: latasha is a 59M with PMH of hypothyroidism, YANIRA (on CPAP), AFIB (on no anticoagulation), and sciatica presents for Cervical 1-4 Laminectomy and Fusion, POD 4. Pain medicine services now consulted.    Patient seen and evaluated at bedside by me. Patient endorsing 9/10 pain to posterior neck radiating to bilateral shoulders, stating pain is so bad that he is not able to sleep at night. Patient follows Dr. Genao for out pt pain management. Patient denies any adverse effects of opioids including confusion, nausea, lethargy, or itching.     #Post op pain s/o C1-4 Lami and Fusion POD4  - Hydromorphone IVP 1mg q4h PRN  - Start Hydromorphone PO 2mg q6h PRN - can send home with 3 day supply  - Diazepam 10mg q8h standing - confirmed with iSTOP  - Gabapentin 300mg TID  - Tylenol 975mg q8h standing    #Opioid induced constipation  - Bowel regimen as per primary team

## 2024-08-23 NOTE — CONSULT NOTE ADULT - SUBJECTIVE AND OBJECTIVE BOX
HPI: 59 y.o.M with a hx of hypothyroidism, YANIRA (on CPAP), AFIB (on no anticoagulation), and sciatica admits he has neck pain x 1.5 years  that radiates down midline of spine. He admits he is prescribed medical marijuana for pain relief. He denies taking any OTC medications for pain relief, trauma, recent falls, confusion, memory loss, stroke, tics, tremors, vision changes, swelling, discoloration, inability to ambulate,  numbness/tingling, urinary incontinence/changes, abdominal pain, and N/V/D. Pt underwent S/P CERVICAL 1-5 LAMINECTOMY INSTRUMENTATION AND FUSION on 8/19/24 for Cervical spondylosis with myelopathy.     < from: MR Cervical Spine No Cont (01.11.23 @ 13:15) >  IMPRESSION:  Examination is limited due to motion.    Right paracentral disc osteophyte complex at C2-C3 causing mass effect   upon the spinal cord without abnormal spinal cord signal.    Multilevel moderate to severe spinal canal stenosis on a congenital basis   in concert with degenerative changes.    --- End of Report ---    < end of copied text >        PAST MEDICAL & SURGICAL HISTORY:  Sleep apnea with use of continuous positive airway pressure (CPAP)      Atrial fibrillation, unspecified type      Sciatica      Hypothyroidism      H/O colonoscopy      History of endoscopy      History of ear surgery          Hospital Course:    TODAY'S SUBJECTIVE & REVIEW OF SYMPTOMS:     Constitutional WNL   Cardio WNL   Resp WNL   GI WNL  Heme WNL  Endo WNL  Skin WNL  MSK neck pain   Neuro WNL  Cognitive WNL  Psych WNL      MEDICATIONS  (STANDING):  ceFAZolin   IVPB 2000 milliGRAM(s) IV Intermittent every 8 hours  diltiazem    Tablet 120 milliGRAM(s) Oral daily  HYDROmorphone PCA (1 mG/mL) 30 milliLiter(s) PCA Continuous PCA Continuous  levothyroxine 100 MICROGram(s) Oral daily  methocarbamol 750 milliGRAM(s) Oral every 6 hours  pantoprazole    Tablet 40 milliGRAM(s) Oral before breakfast  polyethylene glycol 3350 17 Gram(s) Oral daily  senna 2 Tablet(s) Oral at bedtime  sodium chloride 0.9%. 1000 milliLiter(s) (100 mL/Hr) IV Continuous <Continuous>    MEDICATIONS  (PRN):  HYDROmorphone  Injectable 0.5 milliGRAM(s) IV Push every 10 minutes PRN Moderate Pain (4 - 6)  ondansetron Injectable 4 milliGRAM(s) IV Push once PRN Nausea and/or Vomiting      FAMILY HISTORY:  Family history of colon cancer in father (Father)        Allergies    Allergy Status Unknown    Intolerances    morphine (Other; Pruritus)      SOCIAL HISTORY:    [  ] Etoh  [  ] Smoking  [  ] Substance abuse     Home Environment:  [   ] Home Alone  [ x  ] Lives with Family  [   ] Home Health Aid    Dwelling:  [   ] Apartment  [ x  ] Private House  [   ] Adult Home  [   ] Skilled Nursing Facility      [   ] Short Term  [   ] Long Term  [ x ] Stairs       Elevator [   ]    FUNCTIONAL STATUS PTA: (Check all that apply)  Ambulation: [  x  ]Independent    [   ] Dependent     [   ] Non-Ambulatory  Assistive Device: [   ] SA Cane  [   ]  Q Cane  [   ] Walker  [   ]  Wheelchair  ADL : [ x  ] Independent  [    ]  Dependent       Vital Signs Last 24 Hrs  T(C): 36.8 (19 Aug 2024 11:45), Max: 36.8 (19 Aug 2024 06:23)  T(F): 98.3 (19 Aug 2024 11:45), Max: 98.3 (19 Aug 2024 11:45)  HR: 62 (19 Aug 2024 14:30) (62 - 88)  BP: 142/65 (19 Aug 2024 14:30) (113/58 - 169/77)  BP(mean): 93 (19 Aug 2024 14:30) (78 - 108)  RR: 19 (19 Aug 2024 14:30) (12 - 20)  SpO2: 97% (19 Aug 2024 14:30) (95% - 99%)    Parameters below as of 19 Aug 2024 14:30  Patient On (Oxygen Delivery Method): nasal cannula  O2 Flow (L/min): 2        PHYSICAL EXAM: Awake & Alert  GENERAL: NAD  HEAD:  Normocephalic  CHEST/LUNG: Clear   HEART: S1S2+  ABDOMEN: Soft, Nontender  EXTREMITIES:  no calf tenderness    NERVOUS SYSTEM:  Cranial Nerves 2-12 intact [   ] Abnormal  [   ]  ROM: WFL all extremities [ x  ]  Abnormal [   ]  Motor Strength: WFL all extremities  [   ]  Abnormal [ x  ]limited bilateral  strength   Sensation: intact to light touch [  x ] Abnormal [   ]    FUNCTIONAL STATUS:  Bed Mobility: Independent [   ]  Supervision [   ]  Needs Assistance [  x ]  N/A [   ]  Transfers: Independent [   ]  Supervision [   ]  Needs Assistance [   ]  N/A [   ]   Ambulation: Independent [   ]  Supervision [   ]  Needs Assistance [   ]  N/A [   ]  ADL: Independent [   ] Requires Assistance [   ] N/A [   ]      LABS:                RADIOLOGY & ADDITIONAL STUDIES:  
HPI: Patient is a 59M with PMH of hypothyroidism, YANIRA (on CPAP), AFIB (on no anticoagulation), and sciatica presents for Cervical 1-4 Laminectomy and Fusion, POD 4. Pain medicine services now consulted.    Patient seen and evaluated at bedside by me. Patient endorsing 9/10 pain to posterior neck radiating to bilateral shoulders, stating pain is so bad that he is not able to sleep at night. Patient follows Dr. Genao for out pt pain management. Patient denies any adverse effects of opioids including confusion, nausea, lethargy, or itching.     Current Inpatient Medication Regimen:  acetaminophen     Tablet .. 975 milliGRAM(s) Oral every 8 hours  aluminum hydroxide/magnesium hydroxide/simethicone Suspension 30 milliLiter(s) Oral every 4 hours PRN  chlorhexidine 2% Cloths 1 Application(s) Topical <User Schedule>  diazepam    Tablet 10 milliGRAM(s) Oral every 8 hours  diltiazem    milliGRAM(s) Oral daily  gabapentin 300 milliGRAM(s) Oral every 8 hours  heparin   Injectable 5000 Unit(s) SubCutaneous every 8 hours  HYDROmorphone  Injectable 1 milliGRAM(s) IV Push every 4 hours PRN  levothyroxine 100 MICROGram(s) Oral daily  ondansetron Injectable 4 milliGRAM(s) IV Push every 6 hours  pantoprazole    Tablet 40 milliGRAM(s) Oral before breakfast  polyethylene glycol 3350 17 Gram(s) Oral daily  senna 2 Tablet(s) Oral at bedtime      Allergies:  morphine (Other; Pruritus)  Allergy Status Unknown      Past Medical History:  Cervical radiculopathy    Dorsalgia, unspecified    Elevation of levels of liver transaminase levels    Essential (primary) hypertension    Generalized anxiety disorder    Hypothyroidism, unspecified    Low back pain, unspecified    Nonalcoholic steatohepatitis (GALLEGOS)    Obesity, unspecified    Obstructive sleep apnea (adult) (pediatric)    Other malaise    Pain in right hip    Panic disorder [episodic paroxysmal anxiety]    Prediabetes    Simple chronic bronchitis    Unspecified atrial fibrillation    Unspecified cord compression    Weakness    Alcohol history    Has the patient ever used illegal drugs?    Number of children    No pertinent family history in first degree relatives    Family history of colon cancer in father (Father)    Sleep apnea with use of continuous positive airway pressure (CPAP)    Atrial fibrillation, unspecified type    Sciatica    Hypothyroidism    Cervical spondylosis with myelopathy    Cervical spondylosis with myelopathy    Laminectomy, cervical, with foraminotomy or facetectomy, 1 level    Fusion, spine, cervical, 4 levels, posterior approach    No significant past surgical history    H/O colonoscopy    History of endoscopy    History of ear surgery        Review of Systems:  General: no fevers or chills  Eyes: no diplopia or blurred vision  ENT: no rhinorrhea  CV: no chest pain  Resp: no cough or dyspnea  GI: no abdominal pain, constipation, or diarrhea  : no urinary incontinence or dysuria  Neuro: [ no ] weakness, [ no ] numbness, [ no ] headache  Psych: [ no ] depression, [no  ] anxiety    Physical Exam:  T(C): 36.8 (08-23-24 @ 08:22), Max: 37.4 (08-22-24 @ 23:18)  HR: 70 (08-23-24 @ 08:22) (70 - 79)  BP: 156/74 (08-23-24 @ 08:22) (146/74 - 164/86)  RR: 18 (08-23-24 @ 08:22) (18 - 18)  SpO2: 96% (08-23-24 @ 08:22) (95% - 96%)  Gen: NAD  Eyes: [no] glasses, [no  ] scleral icterus  Head: [ x ] Normocephalic / Atraumatic  CV: no JVD  Lungs: nonlabored breathing  Abdomen: [ non ] distended, [x  ] soft  : [x  ] cope catheter in place  Back: [ x ] tenderness to palpation  Neuro: [ x ] AOx3, [ x ] Cranial nerves intact  Extremities: [ x ] full ROM in upper/lower extremities  Psych: [x  ] normal affect      Labs:                        9.1    8.40  )-----------( 119      ( 22 Aug 2024 05:06 )             29.        Opioid Risk Assessment Tool                                                                           Female       Male  Family History  Alcohol                                                              1                3  Illegal drugs                                                       2                3  Rx drugs                                                            4                4    Personal History   Alcohol                                                              3                3  Illegal drugs                                                       4                4  Rx drugs                                                            5                5    Age between 16—45 years                                1                1  History of preadolescent sexual abuse               3                0    Psychological disease  ADD, OCD, bipolar, schizophrenia                      2                2  Depression                                                       1                1    Total Score                                                      __              0    0 - 3 = low risk for future opioid abuse  4 - 7 = moderate risk for future opioid abuse  8+ = high risk for future opioid abuse

## 2024-08-23 NOTE — PROGRESS NOTE ADULT - ASSESSMENT
59 y.o.M with a hx of hypothyroidism, YANIRA (on CPAP), AFIB (on no anticoagulation), and sciatica presents for Cervical 1-4 Laminectomy and Fusion, POD 3. Hospitalist consulted for comanagement      Cervical 1-4 Laminectomy and Fusion, POD 3  - Pain is not controlled, discussed with team  - PT/OT following   - Management per surgery   -  pain management for pain     HTN   continue diltiazem   if pain is controlled and BP remains elevated- recommend starting losartan 25     Thrombocytopenia - improving.  baseline mid-upper 100s  - monitor plts    Atrial fibrillation   Chadsvasc 0 ( 1 if undiagnosed HTN) ( 2 if undiagnosed HTN and DM)  check hba1c   - c/w cardizem, off AC    outpatient follow up may meet criteria to start eliquis if BP remains elevated and HBA1C reveals diabetes     Hypothyroidism - c/w synthroid     H/o pericardial effusion - mild-mod on CT. Prior records reviewed same findings in 2021, patient follows with Dr perez, had a negative workup reportedly. Recently saw Dr Perez. Asymptomatic     Sleep apnea with use of continuous positive airway pressure (CPAP) 59 y.o.M with a hx of hypothyroidism, YANIRA (on CPAP), AFIB (on no anticoagulation), and sciatica presents for Cervical 1-4 Laminectomy and Fusion, POD 3. Hospitalist consulted for comanagement      Cervical 1-4 Laminectomy and Fusion, POD 3  - Pain is not controlled, discussed with team  - PT/OT following   - Management per surgery   -  pain management for pain     HTN   continue diltiazem   if pain is controlled and BP remains elevated- recommend starting losartan 25     Thrombocytopenia - improving.  baseline mid-upper 100s  - monitor plts    Atrial fibrillation s/p cardioversion  - c/w cardizem, off AC        Hypothyroidism - c/w synthroid     H/o pericardial effusion - mild-mod on CT. Prior records reviewed same findings in 2021, patient follows with Dr perez, had a negative workup reportedly. Recently saw Dr Perez. Asymptomatic     Sleep apnea with use of continuous positive airway pressure (CPAP)

## 2024-08-23 NOTE — PROGRESS NOTE ADULT - SUBJECTIVE AND OBJECTIVE BOX
SUBJECTIVE:    Patient is a 59y old Male who presents with a chief complaint of     Today is hospital day 4d.     Overnight Events:     No acute overnight events. No active complaints    PAST MEDICAL & SURGICAL HISTORY  Sleep apnea with use of continuous positive airway pressure (CPAP)    Atrial fibrillation, unspecified type    Sciatica    Hypothyroidism    H/O colonoscopy    History of endoscopy    History of ear surgery          ALLERGIES:  Allergy Status Unknown    MEDICATIONS:  STANDING MEDICATIONS  acetaminophen     Tablet .. 975 milliGRAM(s) Oral every 8 hours  chlorhexidine 2% Cloths 1 Application(s) Topical <User Schedule>  diazepam    Tablet 10 milliGRAM(s) Oral every 8 hours  diltiazem    milliGRAM(s) Oral daily  gabapentin 300 milliGRAM(s) Oral every 8 hours  heparin   Injectable 5000 Unit(s) SubCutaneous every 8 hours  levothyroxine 100 MICROGram(s) Oral daily  ondansetron Injectable 4 milliGRAM(s) IV Push every 6 hours  pantoprazole    Tablet 40 milliGRAM(s) Oral before breakfast  polyethylene glycol 3350 17 Gram(s) Oral daily  senna 2 Tablet(s) Oral at bedtime    PRN MEDICATIONS  aluminum hydroxide/magnesium hydroxide/simethicone Suspension 30 milliLiter(s) Oral every 4 hours PRN  HYDROmorphone   Tablet 2 milliGRAM(s) Oral every 6 hours PRN  HYDROmorphone  Injectable 1 milliGRAM(s) IV Push every 4 hours PRN    VITALS:   ICU Vital Signs Last 24 Hrs  T(C): 36.8 (23 Aug 2024 08:22), Max: 37.4 (22 Aug 2024 23:18)  T(F): 98.3 (23 Aug 2024 08:22), Max: 99.3 (22 Aug 2024 23:18)  HR: 70 (23 Aug 2024 08:22) (70 - 79)  BP: 156/74 (23 Aug 2024 08:22) (146/74 - 164/86)  BP(mean): --  ABP: --  ABP(mean): --  RR: 18 (23 Aug 2024 08:22) (18 - 18)  SpO2: 96% (23 Aug 2024 08:22) (95% - 96%)    O2 Parameters below as of 22 Aug 2024 16:00  Patient On (Oxygen Delivery Method): room air            LABS:                        9.1    8.40  )-----------( 119      ( 22 Aug 2024 05:06 )             29.3                           RADIOLOGY:      Lines:  Central line:              Date placed:             Indication:   Intravenous Access:   NG tube:   Taylor Catheter:       Diagnositic orders     HYDROmorphone   Tablet (08-23-24 @ 09:54) [Active]  remifentanil 1000 MICROGram(s) Injectable (Rx Quick Charge) (08-23-24 @ 09:29) [Auto-Completed]  phenylephrine 40 mG/250 mL NS Infusion Premix (Rx Quick Charge) (08-23-24 @ 09:29) [Auto-Completed]  sugammadex 100 mG/mL Injectable 2 mL vial (Rx Quick Charge) (08-23-24 @ 09:29) [Auto-Completed]  succinylcholine 20 mG/mL Injectable (Rx Quick Charge) (08-23-24 @ 09:29) [Auto-Completed]  rocuronium 10 mG/mL Injectable 5 mL Vial (Rx Quick Charge) (08-23-24 @ 09:29) [Auto-Completed]  propofol (10 mg/mL) 500 mG/50 mL Infusion (Rx Quick Charge) (08-23-24 @ 09:29) [Auto-Completed]  midazolam  1 mG/mL Injectable (Rx Quick Charge) (08-23-24 @ 09:29) [Auto-Completed]  lidocaine 2% Injectable (Rx Quick Charge) (08-23-24 @ 09:29) [Auto-Completed]  glycopyrrolate 0.2 mG/mL Injectable (Rx Quick Charge) (08-23-24 @ 09:29) [Auto-Completed]  dexMEDEtomidine 200 mCg/50 mL NS Infusion Premix (Rx Quick Charge) (08-23-24 @ 09:29) [Auto-Completed]  Consult- Pain Management (08-22-24 @ 18:56) [Completed]  diazepam    Tablet (08-22-24 @ 12:41) [Active]  HYDROmorphone  Injectable (08-22-24 @ 11:40) [Auto-Discontinued]  gabapentin (08-22-24 @ 11:40) [Active]  acetaminophen     Tablet .. (08-22-24 @ 11:40) [Active]     SUBJECTIVE:    Patient is a 59y old Male who presents with a chief complaint of     Today is hospital day 4d.     Overnight Events:     still having pain     PAST MEDICAL & SURGICAL HISTORY  Sleep apnea with use of continuous positive airway pressure (CPAP)    Atrial fibrillation, unspecified type    Sciatica    Hypothyroidism    H/O colonoscopy    History of endoscopy    History of ear surgery          ALLERGIES:  Allergy Status Unknown    MEDICATIONS:  STANDING MEDICATIONS  acetaminophen     Tablet .. 975 milliGRAM(s) Oral every 8 hours  chlorhexidine 2% Cloths 1 Application(s) Topical <User Schedule>  diazepam    Tablet 10 milliGRAM(s) Oral every 8 hours  diltiazem    milliGRAM(s) Oral daily  gabapentin 300 milliGRAM(s) Oral every 8 hours  heparin   Injectable 5000 Unit(s) SubCutaneous every 8 hours  levothyroxine 100 MICROGram(s) Oral daily  ondansetron Injectable 4 milliGRAM(s) IV Push every 6 hours  pantoprazole    Tablet 40 milliGRAM(s) Oral before breakfast  polyethylene glycol 3350 17 Gram(s) Oral daily  senna 2 Tablet(s) Oral at bedtime    PRN MEDICATIONS  aluminum hydroxide/magnesium hydroxide/simethicone Suspension 30 milliLiter(s) Oral every 4 hours PRN  HYDROmorphone   Tablet 2 milliGRAM(s) Oral every 6 hours PRN  HYDROmorphone  Injectable 1 milliGRAM(s) IV Push every 4 hours PRN    VITALS:   ICU Vital Signs Last 24 Hrs  T(C): 36.8 (23 Aug 2024 08:22), Max: 37.4 (22 Aug 2024 23:18)  T(F): 98.3 (23 Aug 2024 08:22), Max: 99.3 (22 Aug 2024 23:18)  HR: 70 (23 Aug 2024 08:22) (70 - 79)  BP: 156/74 (23 Aug 2024 08:22) (146/74 - 164/86)  BP(mean): --  ABP: --  ABP(mean): --  RR: 18 (23 Aug 2024 08:22) (18 - 18)  SpO2: 96% (23 Aug 2024 08:22) (95% - 96%)    O2 Parameters below as of 22 Aug 2024 16:00  Patient On (Oxygen Delivery Method): room air            LABS:                        9.1    8.40  )-----------( 119      ( 22 Aug 2024 05:06 )             29.3                           RADIOLOGY:      Lines:  Central line:              Date placed:             Indication:   Intravenous Access:   NG tube:   Taylor Catheter:       Diagnositic orders     HYDROmorphone   Tablet (08-23-24 @ 09:54) [Active]  remifentanil 1000 MICROGram(s) Injectable (Rx Quick Charge) (08-23-24 @ 09:29) [Auto-Completed]  phenylephrine 40 mG/250 mL NS Infusion Premix (Rx Quick Charge) (08-23-24 @ 09:29) [Auto-Completed]  sugammadex 100 mG/mL Injectable 2 mL vial (Rx Quick Charge) (08-23-24 @ 09:29) [Auto-Completed]  succinylcholine 20 mG/mL Injectable (Rx Quick Charge) (08-23-24 @ 09:29) [Auto-Completed]  rocuronium 10 mG/mL Injectable 5 mL Vial (Rx Quick Charge) (08-23-24 @ 09:29) [Auto-Completed]  propofol (10 mg/mL) 500 mG/50 mL Infusion (Rx Quick Charge) (08-23-24 @ 09:29) [Auto-Completed]  midazolam  1 mG/mL Injectable (Rx Quick Charge) (08-23-24 @ 09:29) [Auto-Completed]  lidocaine 2% Injectable (Rx Quick Charge) (08-23-24 @ 09:29) [Auto-Completed]  glycopyrrolate 0.2 mG/mL Injectable (Rx Quick Charge) (08-23-24 @ 09:29) [Auto-Completed]  dexMEDEtomidine 200 mCg/50 mL NS Infusion Premix (Rx Quick Charge) (08-23-24 @ 09:29) [Auto-Completed]  Consult- Pain Management (08-22-24 @ 18:56) [Completed]  diazepam    Tablet (08-22-24 @ 12:41) [Active]  HYDROmorphone  Injectable (08-22-24 @ 11:40) [Auto-Discontinued]  gabapentin (08-22-24 @ 11:40) [Active]  acetaminophen     Tablet .. (08-22-24 @ 11:40) [Active]

## 2024-08-23 NOTE — PROGRESS NOTE ADULT - ASSESSMENT
59 y.o.M with a hx of hypothyroidism, YANIRA (on CPAP), AFIB (on no anticoagulation), and sciatica presents for Cervical 1-4 Laminectomy and Fusion    PLAN   - Pain management / pain control   - Hospitalist for comanagement   - [facility]    - PT/OT/Rehab: DC home tentatively tomorrow 8.24   - Incentive spirometer   - BM   - Discussed case with attending

## 2024-08-24 PROCEDURE — 99232 SBSQ HOSP IP/OBS MODERATE 35: CPT

## 2024-08-24 RX ORDER — HYDROMORPHONE HYDROCHLORIDE 2 MG/1
0.5 TABLET ORAL ONCE
Refills: 0 | Status: DISCONTINUED | OUTPATIENT
Start: 2024-08-24 | End: 2024-08-24

## 2024-08-24 RX ORDER — METOCLOPRAMIDE HCL 5 MG
5 TABLET ORAL EVERY 8 HOURS
Refills: 0 | Status: COMPLETED | OUTPATIENT
Start: 2024-08-24 | End: 2024-08-25

## 2024-08-24 RX ADMIN — Medication 5000 UNIT(S): at 05:30

## 2024-08-24 RX ADMIN — ACETAMINOPHEN 975 MILLIGRAM(S): 325 TABLET ORAL at 21:29

## 2024-08-24 RX ADMIN — ONDANSETRON 4 MILLIGRAM(S): 2 INJECTION, SOLUTION INTRAMUSCULAR; INTRAVENOUS at 05:34

## 2024-08-24 RX ADMIN — Medication 2 PATCH: at 19:30

## 2024-08-24 RX ADMIN — Medication 2 PATCH: at 13:13

## 2024-08-24 RX ADMIN — KETOROLAC TROMETHAMINE 30 MILLIGRAM(S): 30 INJECTION, SOLUTION INTRAMUSCULAR at 05:38

## 2024-08-24 RX ADMIN — HYDROMORPHONE HYDROCHLORIDE 1 MILLIGRAM(S): 2 TABLET ORAL at 21:04

## 2024-08-24 RX ADMIN — Medication 5000 UNIT(S): at 13:07

## 2024-08-24 RX ADMIN — Medication 10 MILLIGRAM(S): at 21:29

## 2024-08-24 RX ADMIN — CHLORHEXIDINE GLUCONATE 1 APPLICATION(S): 40 SOLUTION TOPICAL at 05:40

## 2024-08-24 RX ADMIN — DILTIAZEM HYDROCHLORIDE 120 MILLIGRAM(S): 5 INJECTION INTRAVENOUS at 05:32

## 2024-08-24 RX ADMIN — HYDROMORPHONE HYDROCHLORIDE 1 MILLIGRAM(S): 2 TABLET ORAL at 09:03

## 2024-08-24 RX ADMIN — KETOROLAC TROMETHAMINE 30 MILLIGRAM(S): 30 INJECTION, SOLUTION INTRAMUSCULAR at 06:08

## 2024-08-24 RX ADMIN — Medication 10 MILLIGRAM(S): at 13:06

## 2024-08-24 RX ADMIN — ONDANSETRON 4 MILLIGRAM(S): 2 INJECTION, SOLUTION INTRAMUSCULAR; INTRAVENOUS at 23:22

## 2024-08-24 RX ADMIN — ACETAMINOPHEN 975 MILLIGRAM(S): 325 TABLET ORAL at 05:31

## 2024-08-24 RX ADMIN — POLYETHYLENE GLYCOL 3350 17 GRAM(S): 17 POWDER, FOR SOLUTION ORAL at 13:08

## 2024-08-24 RX ADMIN — Medication 5 MILLIGRAM(S): at 13:05

## 2024-08-24 RX ADMIN — ONDANSETRON 4 MILLIGRAM(S): 2 INJECTION, SOLUTION INTRAMUSCULAR; INTRAVENOUS at 13:05

## 2024-08-24 RX ADMIN — HYDROMORPHONE HYDROCHLORIDE 1 MILLIGRAM(S): 2 TABLET ORAL at 00:02

## 2024-08-24 RX ADMIN — Medication 2 TABLET(S): at 21:29

## 2024-08-24 RX ADMIN — HYDROMORPHONE HYDROCHLORIDE 1 MILLIGRAM(S): 2 TABLET ORAL at 04:12

## 2024-08-24 RX ADMIN — Medication 300 MILLIGRAM(S): at 05:31

## 2024-08-24 RX ADMIN — Medication 300 MILLIGRAM(S): at 21:29

## 2024-08-24 RX ADMIN — HYDROMORPHONE HYDROCHLORIDE 0.5 MILLIGRAM(S): 2 TABLET ORAL at 00:39

## 2024-08-24 RX ADMIN — Medication 5 MILLIGRAM(S): at 21:29

## 2024-08-24 RX ADMIN — ONDANSETRON 4 MILLIGRAM(S): 2 INJECTION, SOLUTION INTRAMUSCULAR; INTRAVENOUS at 17:32

## 2024-08-24 RX ADMIN — KETOROLAC TROMETHAMINE 30 MILLIGRAM(S): 30 INJECTION, SOLUTION INTRAMUSCULAR at 13:06

## 2024-08-24 RX ADMIN — HYDROMORPHONE HYDROCHLORIDE 0.5 MILLIGRAM(S): 2 TABLET ORAL at 01:09

## 2024-08-24 RX ADMIN — ACETAMINOPHEN 975 MILLIGRAM(S): 325 TABLET ORAL at 13:35

## 2024-08-24 RX ADMIN — Medication 300 MILLIGRAM(S): at 13:07

## 2024-08-24 RX ADMIN — Medication 40 MILLIGRAM(S): at 05:41

## 2024-08-24 RX ADMIN — KETOROLAC TROMETHAMINE 30 MILLIGRAM(S): 30 INJECTION, SOLUTION INTRAMUSCULAR at 13:36

## 2024-08-24 RX ADMIN — ACETAMINOPHEN 975 MILLIGRAM(S): 325 TABLET ORAL at 13:05

## 2024-08-24 RX ADMIN — Medication 10 MILLIGRAM(S): at 05:31

## 2024-08-24 RX ADMIN — Medication 100 MICROGRAM(S): at 05:30

## 2024-08-24 RX ADMIN — HYDROMORPHONE HYDROCHLORIDE 1 MILLIGRAM(S): 2 TABLET ORAL at 20:34

## 2024-08-24 RX ADMIN — HYDROMORPHONE HYDROCHLORIDE 1 MILLIGRAM(S): 2 TABLET ORAL at 14:42

## 2024-08-24 RX ADMIN — HYDROMORPHONE HYDROCHLORIDE 1 MILLIGRAM(S): 2 TABLET ORAL at 15:00

## 2024-08-24 RX ADMIN — ACETAMINOPHEN 975 MILLIGRAM(S): 325 TABLET ORAL at 21:59

## 2024-08-24 RX ADMIN — Medication 296 MILLILITER(S): at 13:16

## 2024-08-24 RX ADMIN — HYDROMORPHONE HYDROCHLORIDE 1 MILLIGRAM(S): 2 TABLET ORAL at 08:33

## 2024-08-24 NOTE — PROGRESS NOTE ADULT - SUBJECTIVE AND OBJECTIVE BOX
Patient seen and examined. Events over the last 24 hours noted.   Pt seen sitting in chair  c/o significant pain in neck and shoulder     T(F): 98.3 (08-24-24 @ 08:33), Max: 98.7 (08-23-24 @ 16:19)  HR: 81 (08-24-24 @ 08:33)  BP: 180/96 (08-24-24 @ 08:33)  RR: 18 (08-24-24 @ 08:33)  SpO2: 96% (08-24-24 @ 08:33) (94% - 96%)    PHYSICAL EXAM:  GEN: No acute distress  HEENT: normocephalic, atraumatic, anicteric  LUNGS: b/l breath sounds present, clear to auscultation bilaterally   HEART: S1/S2 present. RRR  ABD: Soft, non-tender, non-distended. Bowel sounds present  EXT: warm   NEURO: awake, no new focal deficits          MEDICATIONS  (STANDING):  acetaminophen     Tablet .. 975 milliGRAM(s) Oral every 8 hours  chlorhexidine 2% Cloths 1 Application(s) Topical <User Schedule>  diazepam    Tablet 10 milliGRAM(s) Oral every 8 hours  diltiazem    milliGRAM(s) Oral daily  gabapentin 300 milliGRAM(s) Oral every 8 hours  heparin   Injectable 5000 Unit(s) SubCutaneous every 8 hours  ketorolac   Injectable 30 milliGRAM(s) IV Push every 8 hours  levothyroxine 100 MICROGram(s) Oral daily  lidocaine   4% Patch 2 Patch Transdermal daily  magnesium citrate Oral Solution 296 milliLiter(s) Oral once  metoclopramide 5 milliGRAM(s) Oral every 8 hours  ondansetron Injectable 4 milliGRAM(s) IV Push every 6 hours  pantoprazole    Tablet 40 milliGRAM(s) Oral before breakfast  polyethylene glycol 3350 17 Gram(s) Oral daily  senna 2 Tablet(s) Oral at bedtime    MEDICATIONS  (PRN):  aluminum hydroxide/magnesium hydroxide/simethicone Suspension 30 milliLiter(s) Oral every 4 hours PRN Dyspepsia  HYDROmorphone   Tablet 2 milliGRAM(s) Oral every 6 hours PRN Moderate Pain (4 - 6)  HYDROmorphone  Injectable 1 milliGRAM(s) IV Push every 4 hours PRN Severe Pain (7 - 10)

## 2024-08-24 NOTE — PROVIDER CONTACT NOTE (OTHER) - ACTION/TREATMENT ORDERED:
CHUCK Lao told RN to change dressing, apply clean ABD
CHUCK Garcia ordered 1 time dose PRN .5 Dilaudid , PA states this may be pain related.
Provider aware of the above, team will assess dsg. provider stated PCA pump is going to be d/c
Provider stated RN can place a dressing to area
Provider notified of the above, no need to bladder scan patient q6 hours
provider to review pain regimen, pain management following. provider to remove hemovac and change dressing
Provider aware of BP, will order valium and stated RN can change dressing to neck, team aware it is weeping
Provider to review orders and place new regimen

## 2024-08-24 NOTE — PROVIDER CONTACT NOTE (OTHER) - DATE AND TIME:
21-Aug-2024 17:02
22-Aug-2024 11:38
21-Aug-2024 08:00
21-Aug-2024 11:55
22-Aug-2024 09:00
22-Aug-2024 21:40
24-Aug-2024 00:26
22-Aug-2024 16:52

## 2024-08-24 NOTE — PROVIDER CONTACT NOTE (OTHER) - REASON
Message left to update patient 
PET has been denied by insurance  As per Dr Sue Aschoff, will be ordering CT neck, chest, abdomen, pelvis w/wo  Called [facility] scheduling dept to cancel order 
order clarification
pt concerns
soiled post neck dressing
pain
pt concerns
pt concerns
pt concerns and refusal
pt c/o of anxiety, sob

## 2024-08-24 NOTE — PROGRESS NOTE ADULT - SUBJECTIVE AND OBJECTIVE BOX
POD # 5    S/P  Posterior C1-C4 Laminectomy and Fusion       pt seen and examined at bedside pt is alert , has some c/o incisional pain but feeling better than yesterday       Vital Signs Last 24 Hrs  T(C): 36.8 (24 Aug 2024 08:33), Max: 37.1 (23 Aug 2024 16:19)  T(F): 98.3 (24 Aug 2024 08:33), Max: 98.7 (23 Aug 2024 16:19)  HR: 81 (24 Aug 2024 08:33) (76 - 84)  BP: 180/96 (24 Aug 2024 08:33) (180/96 - 188/89)  BP(mean): --  RR: 18 (24 Aug 2024 08:33) (18 - 18)  SpO2: 96% (24 Aug 2024 08:33) (94% - 96%)    Parameters below as of 24 Aug 2024 08:33  Patient On (Oxygen Delivery Method): room air        PHYSICAL EXAM:    ALert, Follows commands   A&OX3   MAEX4   MS bilateral UE's 5/5         bilateral LE's 5/5   Incision no active drainage seen pt with some dried drainage on the gauze           MEDICATIONS:  Antibiotics:    Neuro:  acetaminophen     Tablet .. 975 milliGRAM(s) Oral every 8 hours  diazepam    Tablet 10 milliGRAM(s) Oral every 8 hours  gabapentin 300 milliGRAM(s) Oral every 8 hours  HYDROmorphone   Tablet 2 milliGRAM(s) Oral every 6 hours PRN  HYDROmorphone  Injectable 1 milliGRAM(s) IV Push every 4 hours PRN  ketorolac   Injectable 30 milliGRAM(s) IV Push every 8 hours  ondansetron Injectable 4 milliGRAM(s) IV Push every 6 hours    Anticoagulation:  heparin   Injectable 5000 Unit(s) SubCutaneous every 8 hours    OTHER:  aluminum hydroxide/magnesium hydroxide/simethicone Suspension 30 milliLiter(s) Oral every 4 hours PRN  chlorhexidine 2% Cloths 1 Application(s) Topical <User Schedule>  diltiazem    milliGRAM(s) Oral daily  levothyroxine 100 MICROGram(s) Oral daily  lidocaine   4% Patch 2 Patch Transdermal daily  pantoprazole    Tablet 40 milliGRAM(s) Oral before breakfast  polyethylene glycol 3350 17 Gram(s) Oral daily  senna 2 Tablet(s) Oral at bedtime      A/p                        S/P  Posterior C1-C4 Laminectomy and Fusion                              Dermabond to incision                              Continue pain meds                              Reglan / mag citrate for BM                              continue DVT prophylaxis                              PT/OT/Rehab

## 2024-08-24 NOTE — PROGRESS NOTE ADULT - ASSESSMENT
59 y.o.M with a hx of hypothyroidism, YANIRA (on CPAP), AFIB (on no anticoagulation), and sciatica presents for Cervical 1-4 Laminectomy and Fusion, POD 3. Hospitalist consulted for comanagement      Cervical 1-4 Laminectomy and Fusion, POD 3  - Pain is not controlled, discussed with team  - PT/OT following   - Management per surgery   - Pain management for pain     HTN   continue diltiazem   if pain is controlled and BP remains elevated- recommend starting losartan 25     Thrombocytopenia - improving.  baseline mid-upper 100s  - monitor plts    Atrial fibrillation s/p cardioversion  - c/w cardizem, off AC        Hypothyroidism - c/w synthroid     H/o pericardial effusion - mild-mod on CT. Prior records reviewed same findings in 2021, patient follows with Dr perez, had a negative workup reportedly. Recently saw Dr Perez. Asymptomatic     Sleep apnea with use of continuous positive airway pressure (CPAP)     59 y.o.M with a hx of hypothyroidism, YANIRA (on CPAP), AFIB (on no anticoagulation), and sciatica presents for Cervical 1-4 Laminectomy and Fusion, POD 3. Hospitalist consulted for comanagement      Cervical 1-4 Laminectomy and Fusion, POD 3  - Pain is not controlled  - PT/OT following   - Management per surgery   - Pain management for pain     HTN   continue diltiazem   if pain is controlled and BP remains elevated- recommend starting losartan 25     Thrombocytopenia - improving.  baseline mid-upper 100s  - monitor plts    Atrial fibrillation s/p cardioversion  - c/w cardizem, off AC      Hypothyroidism - c/w synthroid     H/o pericardial effusion - mild-mod on CT. Prior records reviewed same findings in 2021, patient follows with Dr perez, had a negative workup reportedly. Recently saw Dr Perez. Asymptomatic     Sleep apnea with use of continuous positive airway pressure (CPAP)

## 2024-08-24 NOTE — PROVIDER CONTACT NOTE (OTHER) - ASSESSMENT
denies CP
upon assessment, pt's posterior neck dressing is soiled
pt c/o of anxiety, unable to calm down, c/o having difficulty breathing

## 2024-08-24 NOTE — PROGRESS NOTE ADULT - TIME BILLING
Patient seen at bedside, time spent evaluating and treating the patient's acute illness as well as time spent reviewing labs, radiology, discussing with patient and/or patient's family and discussing the case with a multidisciplinary team.
Patient seen at bedside, time spent evaluating and treating the patient's acute illness as well as time spent reviewing labs, radiology, discussing with patient and/or patient's family and discussing the case with a multidisciplinary team.

## 2024-08-25 PROCEDURE — 99232 SBSQ HOSP IP/OBS MODERATE 35: CPT

## 2024-08-25 RX ADMIN — Medication 2 PATCH: at 19:40

## 2024-08-25 RX ADMIN — HYDROMORPHONE HYDROCHLORIDE 1 MILLIGRAM(S): 2 TABLET ORAL at 17:48

## 2024-08-25 RX ADMIN — HYDROMORPHONE HYDROCHLORIDE 1 MILLIGRAM(S): 2 TABLET ORAL at 11:27

## 2024-08-25 RX ADMIN — ACETAMINOPHEN 975 MILLIGRAM(S): 325 TABLET ORAL at 06:52

## 2024-08-25 RX ADMIN — Medication 100 MICROGRAM(S): at 06:22

## 2024-08-25 RX ADMIN — Medication 2 PATCH: at 23:27

## 2024-08-25 RX ADMIN — HYDROMORPHONE HYDROCHLORIDE 1 MILLIGRAM(S): 2 TABLET ORAL at 07:04

## 2024-08-25 RX ADMIN — Medication 300 MILLIGRAM(S): at 13:00

## 2024-08-25 RX ADMIN — Medication 2 PATCH: at 11:27

## 2024-08-25 RX ADMIN — Medication 300 MILLIGRAM(S): at 21:11

## 2024-08-25 RX ADMIN — ACETAMINOPHEN 975 MILLIGRAM(S): 325 TABLET ORAL at 21:12

## 2024-08-25 RX ADMIN — ONDANSETRON 4 MILLIGRAM(S): 2 INJECTION, SOLUTION INTRAMUSCULAR; INTRAVENOUS at 23:16

## 2024-08-25 RX ADMIN — HYDROMORPHONE HYDROCHLORIDE 1 MILLIGRAM(S): 2 TABLET ORAL at 01:15

## 2024-08-25 RX ADMIN — Medication 10 MILLIGRAM(S): at 13:01

## 2024-08-25 RX ADMIN — Medication 2 PATCH: at 01:13

## 2024-08-25 RX ADMIN — Medication 5000 UNIT(S): at 06:23

## 2024-08-25 RX ADMIN — CHLORHEXIDINE GLUCONATE 1 APPLICATION(S): 40 SOLUTION TOPICAL at 06:35

## 2024-08-25 RX ADMIN — HYDROMORPHONE HYDROCHLORIDE 1 MILLIGRAM(S): 2 TABLET ORAL at 06:34

## 2024-08-25 RX ADMIN — ACETAMINOPHEN 975 MILLIGRAM(S): 325 TABLET ORAL at 21:42

## 2024-08-25 RX ADMIN — Medication 5000 UNIT(S): at 13:01

## 2024-08-25 RX ADMIN — Medication 40 MILLIGRAM(S): at 06:34

## 2024-08-25 RX ADMIN — Medication 2 TABLET(S): at 21:12

## 2024-08-25 RX ADMIN — HYDROMORPHONE HYDROCHLORIDE 1 MILLIGRAM(S): 2 TABLET ORAL at 00:45

## 2024-08-25 RX ADMIN — DILTIAZEM HYDROCHLORIDE 120 MILLIGRAM(S): 5 INJECTION INTRAVENOUS at 06:21

## 2024-08-25 RX ADMIN — HYDROMORPHONE HYDROCHLORIDE 1 MILLIGRAM(S): 2 TABLET ORAL at 11:57

## 2024-08-25 RX ADMIN — ONDANSETRON 4 MILLIGRAM(S): 2 INJECTION, SOLUTION INTRAMUSCULAR; INTRAVENOUS at 06:23

## 2024-08-25 RX ADMIN — Medication 300 MILLIGRAM(S): at 06:23

## 2024-08-25 RX ADMIN — Medication 5000 UNIT(S): at 21:12

## 2024-08-25 RX ADMIN — Medication 10 MILLIGRAM(S): at 06:22

## 2024-08-25 RX ADMIN — HYDROMORPHONE HYDROCHLORIDE 1 MILLIGRAM(S): 2 TABLET ORAL at 22:08

## 2024-08-25 RX ADMIN — Medication 5 MILLIGRAM(S): at 06:21

## 2024-08-25 RX ADMIN — ACETAMINOPHEN 975 MILLIGRAM(S): 325 TABLET ORAL at 13:00

## 2024-08-25 RX ADMIN — Medication 10 MILLIGRAM(S): at 21:12

## 2024-08-25 RX ADMIN — ACETAMINOPHEN 975 MILLIGRAM(S): 325 TABLET ORAL at 06:22

## 2024-08-25 RX ADMIN — HYDROMORPHONE HYDROCHLORIDE 1 MILLIGRAM(S): 2 TABLET ORAL at 22:38

## 2024-08-25 NOTE — PROGRESS NOTE ADULT - ASSESSMENT
59M with Hx YANIRA CPAP s/p C1-C4 Posterior Lami & Fusion     PLAN   - Pain control   - Tentative DC home 8.26   - Discussed case with attending

## 2024-08-25 NOTE — PROGRESS NOTE ADULT - SUBJECTIVE AND OBJECTIVE BOX
NEUROSURGERY NOTE  VALENTINA LOOB   08-25-24 @ 09:31    PAST 24HR EVENTS:  POD# 6 s/p C1- C4 Posterior Lami and Fusion   Patient seen and examined at bedside. States that pain is improved overnight. No other acute events overnight. No leakage noted from incision.     HPI: 59y Male     PHYSICAL EXAM:  Awake, alert, following commands   Pupils reactive   MAEx4 with good strength   SILT   Incision: C/D/I    Vital Signs Last 24 Hrs  T(C): 36.8 (25 Aug 2024 09:11), Max: 37.1 (25 Aug 2024 00:37)  T(F): 98.2 (25 Aug 2024 09:11), Max: 98.7 (25 Aug 2024 00:37)  HR: 61 (25 Aug 2024 09:11) (61 - 79)  BP: 108/63 (25 Aug 2024 09:11) (108/63 - 166/82)  BP(mean): --  RR: 18 (25 Aug 2024 09:11) (18 - 18)  SpO2: 95% (25 Aug 2024 09:11) (95% - 96%)    Parameters below as of 25 Aug 2024 09:11  Patient On (Oxygen Delivery Method): room air      I&O's Detail    24 Aug 2024 07:01  -  25 Aug 2024 07:00  --------------------------------------------------------  IN:  Total IN: 0 mL    OUT:    Voided (mL): 3000 mL  Total OUT: 3000 mL    Total NET: -3000 mL        I&O's Summary    24 Aug 2024 07:01  -  25 Aug 2024 07:00  --------------------------------------------------------  IN: 0 mL / OUT: 3000 mL / NET: -3000 mL          MEDS:   acetaminophen     Tablet .. 975 milliGRAM(s) Oral every 8 hours  aluminum hydroxide/magnesium hydroxide/simethicone Suspension 30 milliLiter(s) Oral every 4 hours PRN  chlorhexidine 2% Cloths 1 Application(s) Topical <User Schedule>  diazepam    Tablet 10 milliGRAM(s) Oral every 8 hours  diltiazem    milliGRAM(s) Oral daily  gabapentin 300 milliGRAM(s) Oral every 8 hours  heparin   Injectable 5000 Unit(s) SubCutaneous every 8 hours  HYDROmorphone   Tablet 2 milliGRAM(s) Oral every 6 hours PRN  HYDROmorphone  Injectable 1 milliGRAM(s) IV Push every 4 hours PRN  levothyroxine 100 MICROGram(s) Oral daily  lidocaine   4% Patch 2 Patch Transdermal daily  ondansetron Injectable 4 milliGRAM(s) IV Push every 6 hours  pantoprazole    Tablet 40 milliGRAM(s) Oral before breakfast  polyethylene glycol 3350 17 Gram(s) Oral daily  senna 2 Tablet(s) Oral at bedtime      LABS:              RADIOLOGY:

## 2024-08-25 NOTE — PROGRESS NOTE ADULT - SUBJECTIVE AND OBJECTIVE BOX
Patient seen and examined. Events over the last 24 hours noted.  Pt seen sitting in chair  states pain a little better  BP controlled       T(F): 98.2 (08-25-24 @ 09:11), Max: 98.7 (08-25-24 @ 00:37)  HR: 61 (08-25-24 @ 09:11)  BP: 108/63 (08-25-24 @ 09:11)  RR: 18 (08-25-24 @ 09:11)  SpO2: 95% (08-25-24 @ 09:11) (95% - 96%)    PHYSICAL EXAM:  GEN: No acute distress  HEENT: normocephalic, atraumatic, anicteric  LUNGS: b/l breath sounds present, clear to auscultation bilaterally   HEART: S1/S2 present. RRR  ABD: Soft, non-tender, non-distended. Bowel sounds present  EXT: warm   NEURO: awake, no new focal deficits    LABS        MEDICATIONS  (STANDING):  acetaminophen     Tablet .. 975 milliGRAM(s) Oral every 8 hours  chlorhexidine 2% Cloths 1 Application(s) Topical <User Schedule>  diazepam    Tablet 10 milliGRAM(s) Oral every 8 hours  diltiazem    milliGRAM(s) Oral daily  gabapentin 300 milliGRAM(s) Oral every 8 hours  heparin   Injectable 5000 Unit(s) SubCutaneous every 8 hours  levothyroxine 100 MICROGram(s) Oral daily  lidocaine   4% Patch 2 Patch Transdermal daily  ondansetron Injectable 4 milliGRAM(s) IV Push every 6 hours  pantoprazole    Tablet 40 milliGRAM(s) Oral before breakfast  polyethylene glycol 3350 17 Gram(s) Oral daily  senna 2 Tablet(s) Oral at bedtime    MEDICATIONS  (PRN):  aluminum hydroxide/magnesium hydroxide/simethicone Suspension 30 milliLiter(s) Oral every 4 hours PRN Dyspepsia  HYDROmorphone   Tablet 2 milliGRAM(s) Oral every 6 hours PRN Moderate Pain (4 - 6)  HYDROmorphone  Injectable 1 milliGRAM(s) IV Push every 4 hours PRN Severe Pain (7 - 10)

## 2024-08-25 NOTE — PROGRESS NOTE ADULT - ASSESSMENT
59 y.o.M with a hx of hypothyroidism, YANIRA (on CPAP), AFIB (on no anticoagulation), and sciatica presents for Cervical 1-4 Laminectomy and Fusion, POD 3. Hospitalist consulted for comanagement      Cervical 1-4 Laminectomy and Fusion, POD 3  - Pain is not controlled  - PT/OT following   - Management per surgery   - Pain management for pain     HTN   continue diltiazem   controlled    Thrombocytopenia - improving.  baseline mid-upper 100s  - monitor plts    Atrial fibrillation s/p cardioversion  - c/w cardizem, off AC      Hypothyroidism - c/w synthroid     H/o pericardial effusion - mild-mod on CT. Prior records reviewed same findings in 2021, patient follows with Dr perez, had a negative workup reportedly. Recently saw Dr Perez. Asymptomatic     Sleep apnea with use of continuous positive airway pressure (CPAP)

## 2024-08-26 PROCEDURE — 99232 SBSQ HOSP IP/OBS MODERATE 35: CPT

## 2024-08-26 RX ADMIN — ACETAMINOPHEN 975 MILLIGRAM(S): 325 TABLET ORAL at 06:02

## 2024-08-26 RX ADMIN — ACETAMINOPHEN 975 MILLIGRAM(S): 325 TABLET ORAL at 21:21

## 2024-08-26 RX ADMIN — CHLORHEXIDINE GLUCONATE 1 APPLICATION(S): 40 SOLUTION TOPICAL at 05:41

## 2024-08-26 RX ADMIN — Medication 2 PATCH: at 11:05

## 2024-08-26 RX ADMIN — Medication 10 MILLIGRAM(S): at 13:04

## 2024-08-26 RX ADMIN — DILTIAZEM HYDROCHLORIDE 120 MILLIGRAM(S): 5 INJECTION INTRAVENOUS at 05:33

## 2024-08-26 RX ADMIN — HYDROMORPHONE HYDROCHLORIDE 2 MILLIGRAM(S): 2 TABLET ORAL at 11:05

## 2024-08-26 RX ADMIN — HYDROMORPHONE HYDROCHLORIDE 1 MILLIGRAM(S): 2 TABLET ORAL at 13:34

## 2024-08-26 RX ADMIN — Medication 2 PATCH: at 19:28

## 2024-08-26 RX ADMIN — Medication 300 MILLIGRAM(S): at 05:33

## 2024-08-26 RX ADMIN — Medication 10 MILLIGRAM(S): at 21:23

## 2024-08-26 RX ADMIN — Medication 2 PATCH: at 23:35

## 2024-08-26 RX ADMIN — HYDROMORPHONE HYDROCHLORIDE 1 MILLIGRAM(S): 2 TABLET ORAL at 02:16

## 2024-08-26 RX ADMIN — ACETAMINOPHEN 975 MILLIGRAM(S): 325 TABLET ORAL at 21:26

## 2024-08-26 RX ADMIN — HYDROMORPHONE HYDROCHLORIDE 1 MILLIGRAM(S): 2 TABLET ORAL at 08:12

## 2024-08-26 RX ADMIN — Medication 300 MILLIGRAM(S): at 21:22

## 2024-08-26 RX ADMIN — HYDROMORPHONE HYDROCHLORIDE 1 MILLIGRAM(S): 2 TABLET ORAL at 02:46

## 2024-08-26 RX ADMIN — HYDROMORPHONE HYDROCHLORIDE 1 MILLIGRAM(S): 2 TABLET ORAL at 18:58

## 2024-08-26 RX ADMIN — HYDROMORPHONE HYDROCHLORIDE 1 MILLIGRAM(S): 2 TABLET ORAL at 13:04

## 2024-08-26 RX ADMIN — Medication 2 TABLET(S): at 21:22

## 2024-08-26 RX ADMIN — ACETAMINOPHEN 975 MILLIGRAM(S): 325 TABLET ORAL at 13:04

## 2024-08-26 RX ADMIN — Medication 100 MICROGRAM(S): at 05:37

## 2024-08-26 RX ADMIN — HYDROMORPHONE HYDROCHLORIDE 1 MILLIGRAM(S): 2 TABLET ORAL at 08:42

## 2024-08-26 RX ADMIN — Medication 10 MILLIGRAM(S): at 05:32

## 2024-08-26 RX ADMIN — Medication 40 MILLIGRAM(S): at 05:33

## 2024-08-26 RX ADMIN — Medication 300 MILLIGRAM(S): at 13:04

## 2024-08-26 RX ADMIN — ACETAMINOPHEN 975 MILLIGRAM(S): 325 TABLET ORAL at 05:32

## 2024-08-26 RX ADMIN — ONDANSETRON 4 MILLIGRAM(S): 2 INJECTION, SOLUTION INTRAMUSCULAR; INTRAVENOUS at 05:33

## 2024-08-26 RX ADMIN — HYDROMORPHONE HYDROCHLORIDE 2 MILLIGRAM(S): 2 TABLET ORAL at 11:35

## 2024-08-26 NOTE — PROGRESS NOTE ADULT - SUBJECTIVE AND OBJECTIVE BOX
Subjective: 59yMale with a pmhx of Cervical radiculopathy    POD# 7 s/p C1- C4 Posterior Lami and Fusion   Patient seen and examined at bedside with Dr Winston. Pt reports he maintains surgical site pain. Pt worked with physical therapy and would like to do stairs.     Body mass index [BMI] 36.0-36.9, adult    Body mass index [BMI] 37.0-37.9, adult    Body mass index [BMI] 38.0-38.9, adult    Body mass index [BMI] 39.0-39.9, adult    Body mass index [BMI] 40.0-44.9, adult    Dorsalgia, unspecified    Elevation of levels of liver transaminase levels    Essential (primary) hypertension    Generalized anxiety disorder    Hypothyroidism, unspecified    Low back pain, unspecified    Nonalcoholic steatohepatitis (GALLEGOS)    Obesity, unspecified    Obstructive sleep apnea (adult) (pediatric)    Other malaise    Pain in right hip    Panic disorder [episodic paroxysmal anxiety]    Prediabetes    Simple chronic bronchitis    Unspecified atrial fibrillation    Unspecified cord compression    Weakness    Alcohol history    Has the patient ever used illegal drugs?    Number of children    No pertinent family history in first degree relatives    Family history of colon cancer in father (Father)    Handoff    MEWS Score    Sleep apnea with use of continuous positive airway pressure (CPAP)    Atrial fibrillation, unspecified type    Sciatica    Hypothyroidism    Cervical spondylosis with myelopathy    Cervical spondylosis with myelopathy    Laminectomy, cervical, with foraminotomy or facetectomy, 1 level    Fusion, spine, cervical, 4 levels, posterior approach    No significant past surgical history    H/O colonoscopy    History of endoscopy    History of ear surgery    03257; 60038; 98080; 60181    SysAdmin_VstLnk          Allergies    Allergy Status Unknown    Intolerances    morphine (Other; Pruritus)      Vital Signs Last 24 Hrs  T(C): 36.7 (26 Aug 2024 00:11), Max: 36.8 (25 Aug 2024 09:11)  T(F): 98.1 (26 Aug 2024 00:11), Max: 98.2 (25 Aug 2024 09:11)  HR: 70 (26 Aug 2024 00:11) (61 - 70)  BP: 164/72 (26 Aug 2024 00:11) (108/63 - 166/70)  BP(mean): --  RR: 18 (26 Aug 2024 00:11) (18 - 18)  SpO2: 95% (26 Aug 2024 00:11) (95% - 95%)      acetaminophen     Tablet .. 975 milliGRAM(s) Oral every 8 hours  aluminum hydroxide/magnesium hydroxide/simethicone Suspension 30 milliLiter(s) Oral every 4 hours PRN  chlorhexidine 2% Cloths 1 Application(s) Topical <User Schedule>  diazepam    Tablet 10 milliGRAM(s) Oral every 8 hours  diltiazem    milliGRAM(s) Oral daily  gabapentin 300 milliGRAM(s) Oral every 8 hours  heparin   Injectable 5000 Unit(s) SubCutaneous every 8 hours  HYDROmorphone   Tablet 2 milliGRAM(s) Oral every 6 hours PRN  HYDROmorphone  Injectable 1 milliGRAM(s) IV Push every 4 hours PRN  levothyroxine 100 MICROGram(s) Oral daily  lidocaine   4% Patch 2 Patch Transdermal daily  ondansetron Injectable 4 milliGRAM(s) IV Push every 6 hours  pantoprazole    Tablet 40 milliGRAM(s) Oral before breakfast  polyethylene glycol 3350 17 Gram(s) Oral daily  senna 2 Tablet(s) Oral at bedtime        08-25-24 @ 07:01  -  08-26-24 @ 07:00  --------------------------------------------------------  IN: 0 mL / OUT: 1925 mL / NET: -1925 mL        REVIEW OF SYSTEMS    [x ] A ten-point review of systems was otherwise negative except as noted.  [ ] Due to altered mental status/intubation, subjective information were not able to be obtained from the patient. History was obtained, to the extent possible, from review of the chart and collateral sources of information.      Exam:  Awake, alert, following commands  Motor: MAEx4  5/5 power in b/l UE and LE  SILT   Wound: Incision c/d/i         Assessment/Plan:   POD# 7 s/p C1- C4 Posterior Lami and Fusion   - Encouraged incentive spirometry  - DVT ppx  - pain control  - PT/OT/rehab  - Hospitalist co management   - D/W attending

## 2024-08-26 NOTE — PROGRESS NOTE ADULT - SUBJECTIVE AND OBJECTIVE BOX
spoke with wife and family at beside  not ambulating significantly  clarified d/c decision making process    MEDICATIONS:  MEDICATIONS  (STANDING):  acetaminophen     Tablet .. 975 milliGRAM(s) Oral every 8 hours  chlorhexidine 2% Cloths 1 Application(s) Topical <User Schedule>  diazepam    Tablet 10 milliGRAM(s) Oral every 8 hours  diltiazem    milliGRAM(s) Oral daily  gabapentin 300 milliGRAM(s) Oral every 8 hours  heparin   Injectable 5000 Unit(s) SubCutaneous every 8 hours  levothyroxine 100 MICROGram(s) Oral daily  lidocaine   4% Patch 2 Patch Transdermal daily  ondansetron Injectable 4 milliGRAM(s) IV Push every 6 hours  pantoprazole    Tablet 40 milliGRAM(s) Oral before breakfast  polyethylene glycol 3350 17 Gram(s) Oral daily  senna 2 Tablet(s) Oral at bedtime    MEDICATIONS  (PRN):  aluminum hydroxide/magnesium hydroxide/simethicone Suspension 30 milliLiter(s) Oral every 4 hours PRN Dyspepsia  HYDROmorphone   Tablet 2 milliGRAM(s) Oral every 6 hours PRN Moderate Pain (4 - 6)  HYDROmorphone  Injectable 1 milliGRAM(s) IV Push every 4 hours PRN Severe Pain (7 - 10)    Allergy: morphine (Other; Pruritus)  Allergy Status Unknown        VITALS:  T(F): 98.6 (08-26-24 @ 08:22), Max: 98.6 (08-26-24 @ 08:22)  HR: 75 (08-26-24 @ 13:50)  BP: 152/68 (08-26-24 @ 13:50) (152/68 - 176/72)  RR: 18 (08-26-24 @ 08:22)  SpO2: 96% (08-26-24 @ 08:22)    eomi  a x o x 3    a/p    59 y.o.M with a hx of hypothyroidism, YANIRA (on CPAP), AFIB (on no anticoagulation), and sciatica presents for Cervical 1-4 Laminectomy and Fusion, POD 3. Hospitalist consulted for comanagement      Cervical 1-4 Laminectomy and Fusion, POD 3  - PT/OT following     HTN   continue diltiazem   controlled    Thrombocytopenia - improving.  baseline mid-upper 100s  - monitor plts    Paroxsymal Atrial fibrillation s/p cardioversion  - c/w cardizem, off AC      Hypothyroidism - c/w synthroid     H/o pericardial effusion - mild-mod on CT. Prior records reviewed same findings in 2021, patient follows with Dr perez, had a negative workup reportedly. Recently saw Dr Perez. Asymptomatic     Sleep apnea with use of continuous positive airway pressure (CPAP)     Morbid obesity BMI 41-outpatient counseling and management

## 2024-08-27 ENCOUNTER — TRANSCRIPTION ENCOUNTER (OUTPATIENT)
Age: 59
End: 2024-08-27

## 2024-08-27 VITALS
HEART RATE: 74 BPM | OXYGEN SATURATION: 96 % | RESPIRATION RATE: 18 BRPM | SYSTOLIC BLOOD PRESSURE: 172 MMHG | DIASTOLIC BLOOD PRESSURE: 73 MMHG | TEMPERATURE: 98 F

## 2024-08-27 PROCEDURE — 99232 SBSQ HOSP IP/OBS MODERATE 35: CPT

## 2024-08-27 RX ORDER — GABAPENTIN 100 MG
1 CAPSULE ORAL
Qty: 90 | Refills: 0
Start: 2024-08-27 | End: 2024-09-25

## 2024-08-27 RX ORDER — POLYETHYLENE GLYCOL 3350 17 G/17G
17 POWDER, FOR SOLUTION ORAL
Qty: 0 | Refills: 0 | DISCHARGE
Start: 2024-08-27

## 2024-08-27 RX ORDER — SENNA 187 MG
2 TABLET ORAL
Qty: 0 | Refills: 0 | DISCHARGE
Start: 2024-08-27

## 2024-08-27 RX ORDER — HYDROMORPHONE HYDROCHLORIDE 2 MG/1
1 TABLET ORAL
Qty: 20 | Refills: 0
Start: 2024-08-27 | End: 2024-08-31

## 2024-08-27 RX ORDER — METHOCARBAMOL 750 MG/1
1 TABLET, FILM COATED ORAL
Qty: 42 | Refills: 0
Start: 2024-08-27 | End: 2024-09-09

## 2024-08-27 RX ADMIN — HYDROMORPHONE HYDROCHLORIDE 1 MILLIGRAM(S): 2 TABLET ORAL at 02:36

## 2024-08-27 RX ADMIN — HYDROMORPHONE HYDROCHLORIDE 2 MILLIGRAM(S): 2 TABLET ORAL at 10:37

## 2024-08-27 RX ADMIN — HYDROMORPHONE HYDROCHLORIDE 1 MILLIGRAM(S): 2 TABLET ORAL at 12:08

## 2024-08-27 RX ADMIN — HYDROMORPHONE HYDROCHLORIDE 1 MILLIGRAM(S): 2 TABLET ORAL at 02:21

## 2024-08-27 RX ADMIN — CHLORHEXIDINE GLUCONATE 1 APPLICATION(S): 40 SOLUTION TOPICAL at 06:06

## 2024-08-27 RX ADMIN — Medication 5000 UNIT(S): at 06:01

## 2024-08-27 RX ADMIN — HYDROMORPHONE HYDROCHLORIDE 1 MILLIGRAM(S): 2 TABLET ORAL at 12:38

## 2024-08-27 RX ADMIN — Medication 10 MILLIGRAM(S): at 06:03

## 2024-08-27 RX ADMIN — ACETAMINOPHEN 975 MILLIGRAM(S): 325 TABLET ORAL at 13:30

## 2024-08-27 RX ADMIN — ACETAMINOPHEN 975 MILLIGRAM(S): 325 TABLET ORAL at 06:04

## 2024-08-27 RX ADMIN — HYDROMORPHONE HYDROCHLORIDE 1 MILLIGRAM(S): 2 TABLET ORAL at 06:54

## 2024-08-27 RX ADMIN — DILTIAZEM HYDROCHLORIDE 120 MILLIGRAM(S): 5 INJECTION INTRAVENOUS at 06:08

## 2024-08-27 RX ADMIN — Medication 300 MILLIGRAM(S): at 13:33

## 2024-08-27 RX ADMIN — ACETAMINOPHEN 975 MILLIGRAM(S): 325 TABLET ORAL at 06:08

## 2024-08-27 RX ADMIN — Medication 10 MILLIGRAM(S): at 13:30

## 2024-08-27 RX ADMIN — HYDROMORPHONE HYDROCHLORIDE 2 MILLIGRAM(S): 2 TABLET ORAL at 10:07

## 2024-08-27 RX ADMIN — Medication 40 MILLIGRAM(S): at 06:06

## 2024-08-27 RX ADMIN — HYDROMORPHONE HYDROCHLORIDE 1 MILLIGRAM(S): 2 TABLET ORAL at 07:15

## 2024-08-27 RX ADMIN — Medication 100 MICROGRAM(S): at 06:03

## 2024-08-27 RX ADMIN — Medication 300 MILLIGRAM(S): at 06:03

## 2024-08-27 NOTE — DISCHARGE NOTE PROVIDER - NSDCFUSCHEDAPPT_GEN_ALL_CORE_FT
[facility]  NEUROSURG [address] [facility]  Scheduled Appointment: 09/04/2024

## 2024-08-27 NOTE — DISCHARGE NOTE NURSING/CASE MANAGEMENT/SOCIAL WORK - NSDCPEFALRISK_GEN_ALL_CORE
For information on Fall & Injury Prevention, visit: [email or web address] OR  [email or web address] OR  https://www.cdc.gov/steadi/patient.html

## 2024-08-27 NOTE — DISCHARGE NOTE PROVIDER - NSDCCPTREATMENT_GEN_ALL_CORE_FT
PRINCIPAL PROCEDURE  Procedure: Laminectomy, cervical, with foraminotomy or facetectomy, 1 level  Findings and Treatment:       SECONDARY PROCEDURE  Procedure: Fusion, spine, cervical, 4 levels, posterior approach  Findings and Treatment:

## 2024-08-27 NOTE — PROGRESS NOTE ADULT - ASSESSMENT
Assessment and Plan:     59 y.o.M with a hx of hypothyroidism, YANIRA (on CPAP), AFIB (on no anticoagulation), and sciatica presents for Cervical 1-4 Laminectomy and Fusion, POD 3. Hospitalist consulted for comanagement      Cervical 1-4 Laminectomy and Fusion, POD 3  - PT/OT following     HTN, Essential   continue diltiazem   controlled    Thrombocytopenia - improving.  baseline mid-upper 100s  - monitor plts    Paroxsymal Atrial fibrillation s/p cardioversion  - c/w cardizem, off AC      Hypothyroidism - c/w synthroid     H/o pericardial effusion - mild-mod on CT. Prior records reviewed same findings in 2021, patient follows with Dr perez, had a negative workup reportedly. Recently saw Dr Perez. Asymptomatic     Obstructive Sleep apnea with use of continuous positive airway pressure (CPAP)     Morbid obesity BMI 41-outpatient counseling and management    #Disposition and Medical Necessity:  - To be determine by Primary team Neurosurgery,   -If pt stable will sign off case tomorrow.     I have seen and examined the patient today and I spend time with the patient half of the time face-to-face encounter, I examine the patient, reviewed medications, I have reviewed laboratories, and imaging, and discussed plan of care with nurses staff. Please excuse any dictation or typographical errors that have not been edited out.    Plan of care was discussed with the patient and or family and they agree with plan of care with good feedback.         Assessment and Plan:     59 y.o.M with a hx of hypothyroidism, YANIRA (on CPAP), AFIB (on no anticoagulation), and sciatica presents for Cervical 1-4 Laminectomy and Fusion, POD 3. Hospitalist consulted for comanagement      Cervical 1-4 Laminectomy and Fusion, POD 3  - PT/OT following     HTN, Essential   continue diltiazem   controlled    Thrombocytopenia - improving.  baseline mid-upper 100s  - monitor plts    Paroxsymal Atrial fibrillation s/p cardioversion  - c/w cardizem, off AC      Hypothyroidism - c/w synthroid     H/o pericardial effusion - mild-mod on CT. Prior records reviewed same findings in 2021, patient follows with Dr perez, had a negative workup reportedly. Recently saw Dr Perez. Asymptomatic     Obstructive Sleep apnea with use of continuous positive airway pressure (CPAP)     Morbid obesity BMI 41-outpatient counseling and management    #Disposition and Medical Necessity:  - To be determine by Primary team Neurosurgery,   -Will sign off case, Thank you for letting us participated on the Car for Mr. Cutler, call us if any further questions through TEAMS marilyn.       I have seen and examined the patient today and I spend time with the patient half of the time face-to-face encounter, I examine the patient, reviewed medications, I have reviewed laboratories, and imaging, and discussed plan of care with nurses staff. Please excuse any dictation or typographical errors that have not been edited out.    Plan of care was discussed with the patient and or family and they agree with plan of care with good feedback.

## 2024-08-27 NOTE — PROGRESS NOTE ADULT - SUBJECTIVE AND OBJECTIVE BOX
T H I S   I S    N O  T   A    F I N A L I Z E D   N O T [facility]CR[facility]  59y, Male  Allergy: morphine (Other; Pruritus)  Allergy Status Unknown    Hospital Day: 8d    Patient-------------------    LAST 24-Hr EVENTS:    VITALS:  T(F): 98.1 (08-26-24 @ 23:30), Max: 98.6 (08-26-24 @ 08:22)  HR: 77 (08-26-24 @ 23:30)  BP: 163/83 (08-26-24 @ 23:30) (152/68 - 176/72)  RR: 18 (08-26-24 @ 23:30)  SpO2: 96% (08-26-24 @ 23:30)    PHYSICAL EXAM:    Physical exam:   GENERAL: No acute distress, non-toxic appearing.  HEAD:  Normal with no signs of head trauma.  EYES:  PERRLA, EOMI, conjunctiva normal, Anicteric  ENT:  Mucosa Moist  NECK:  Supple, no tenderness, no bruits  LUNGS:  Clear breath sounds bilaterally.  No wheezes, rales, or rhonchi.  HEART:  Regular rate and rhythm. Normal S1 and S2, without murmurs, rub or gallop.  VASC: No edema. Peripheral pulses normal and equal in all extremities.  ABD:  Bowel sounds normal, soft, nontender, no masses, no organomegaly.  EXT: no clubbing, no cyanosis.  NEURO: Alert and oriented x 3. Normal affect. Cranial nerves intact. No focal     TESTS & MEASUREMENTS:  Weight/Height/BMI  Height (cm): 182.9 (08-19-24 @ 07:42)  Weight (kg): 138.3 (08-19-24 @ 07:42)  BMI (kg/m2): 41.3 (08-19-24 @ 07:42)    08-25-24 @ 07:01  -  08-26-24 @ 07:00  --------------------------------------------------------  IN: 0 mL / OUT: 1925 mL / NET: -1925 mL    08-26-24 @ 07:01  -  08-27-24 @ 07:00  --------------------------------------------------------  IN: 0 mL / OUT: 320 mL / NET: -320 mL    A1C with Estimated Average Glucose Result: 6.8 % (07-25-24 @ 11:30)    RADIOLOGY, ECG, & ADDITIONAL TESTS:  12 Lead ECG:   Ventricular Rate 68 BPM    Atrial Rate 68 BPM    P-R Interval 168 ms    QRS Duration 104 ms    Q-T Interval 410 ms    QTC Calculation(Bazett) 435 ms    P Axis -30 degrees    R Axis -7 degrees    T Axis 42 degrees    Diagnosis Line Unusual P axis, possible ectopic atrial rhythm with Premature atrial complexes   in a pattern of bigeminy  Minimal voltage criteria for LVH, may be normal variant ( [place] product )  Abnormal ECG    Confirmed by Juan Mendez ([id number]) on 7/25/2024 2:46:18 PM (07-25-24 @ 12:10)      RECENT DIAGNOSTIC ORDERS:      MEDICATIONS:  MEDICATIONS  (STANDING):  acetaminophen     Tablet .. 975 milliGRAM(s) Oral every 8 hours  chlorhexidine 2% Cloths 1 Application(s) Topical <User Schedule>  diazepam    Tablet 10 milliGRAM(s) Oral every 8 hours  diltiazem    milliGRAM(s) Oral daily  gabapentin 300 milliGRAM(s) Oral every 8 hours  heparin   Injectable 5000 Unit(s) SubCutaneous every 8 hours  levothyroxine 100 MICROGram(s) Oral daily  lidocaine   4% Patch 2 Patch Transdermal daily  ondansetron Injectable 4 milliGRAM(s) IV Push every 6 hours  pantoprazole    Tablet 40 milliGRAM(s) Oral before breakfast  polyethylene glycol 3350 17 Gram(s) Oral daily  senna 2 Tablet(s) Oral at bedtime    MEDICATIONS  (PRN):  aluminum hydroxide/magnesium hydroxide/simethicone Suspension 30 milliLiter(s) Oral every 4 hours PRN Dyspepsia  HYDROmorphone   Tablet 2 milliGRAM(s) Oral every 6 hours PRN Moderate Pain (4 - 6)  HYDROmorphone  Injectable 1 milliGRAM(s) IV Push every 4 hours PRN Severe Pain (7 - 10)      HOME MEDICATIONS (as per chart review):  Cardizem 120 mg oral tablet (07-25)  levothyroxine 100 mcg (0.1 mg) oral tablet (07-25)       [facility] LOBO  59y, Male  Allergy: morphine (Other; Pruritus)  Allergy Status Unknown    Hospital Day: 8d    Patient In NAD, feeling better, still has some radicular pain. We are consult comanagement service for this pt and primary team is [id number]    LAST 24-Hr EVENTS:    VITALS:  T(F): 98.1 (08-26-24 @ 23:30), Max: 98.6 (08-26-24 @ 08:22)  HR: 77 (08-26-24 @ 23:30)  BP: 163/83 (08-26-24 @ 23:30) (152/68 - 176/72)  RR: 18 (08-26-24 @ 23:30)  SpO2: 96% (08-26-24 @ 23:30)    PHYSICAL EXAM:    Physical exam:   GENERAL: No acute distress, non-toxic appearing.  HEAD:  Normal with no signs of head trauma.  EYES:  PERRLA, EOMI, conjunctiva normal, Anicteric  ENT:  Mucosa Moist  NECK:  Supple, no tenderness, no bruits  LUNGS:  Clear breath sounds bilaterally.  No wheezes, rales, or rhonchi.  HEART:  Regular rate and rhythm. Normal S1 and S2, without murmurs, rub or gallop.  VASC: No edema. Peripheral pulses normal and equal in all extremities.  ABD:  Bowel sounds normal, soft, nontender, no masses, no organomegaly.  EXT: no clubbing, no cyanosis.  NEURO: Alert and oriented x 3. Normal affect. Cranial nerves intact. No focal     TESTS & MEASUREMENTS:  Weight/Height/BMI  Height (cm): 182.9 (08-19-24 @ 07:42)  Weight (kg): 138.3 (08-19-24 @ 07:42)  BMI (kg/m2): 41.3 (08-19-24 @ 07:42)    08-25-24 @ 07:01  -  08-26-24 @ 07:00  --------------------------------------------------------  IN: 0 mL / OUT: 1925 mL / NET: -1925 mL    08-26-24 @ 07:01  -  08-27-24 @ 07:00  --------------------------------------------------------  IN: 0 mL / OUT: 320 mL / NET: -320 mL    A1C with Estimated Average Glucose Result: 6.8 % (07-25-24 @ 11:30)    RADIOLOGY, ECG, & ADDITIONAL TESTS:  12 Lead ECG:   Ventricular Rate 68 BPM    Atrial Rate 68 BPM    P-R Interval 168 ms    QRS Duration 104 ms    Q-T Interval 410 ms    QTC Calculation(Bazett) 435 ms    P Axis -30 degrees    R Axis -7 degrees    T Axis 42 degrees    Diagnosis Line Unusual P axis, possible ectopic atrial rhythm with Premature atrial complexes   in a pattern of bigeminy  Minimal voltage criteria for LVH, may be normal variant ( [place] product )  Abnormal ECG    Confirmed by Juan Mendez ([id number]) on 7/25/2024 2:46:18 PM (07-25-24 @ 12:10)      RECENT DIAGNOSTIC ORDERS:      MEDICATIONS:  MEDICATIONS  (STANDING):  acetaminophen     Tablet .. 975 milliGRAM(s) Oral every 8 hours  chlorhexidine 2% Cloths 1 Application(s) Topical <User Schedule>  diazepam    Tablet 10 milliGRAM(s) Oral every 8 hours  diltiazem    milliGRAM(s) Oral daily  gabapentin 300 milliGRAM(s) Oral every 8 hours  heparin   Injectable 5000 Unit(s) SubCutaneous every 8 hours  levothyroxine 100 MICROGram(s) Oral daily  lidocaine   4% Patch 2 Patch Transdermal daily  ondansetron Injectable 4 milliGRAM(s) IV Push every 6 hours  pantoprazole    Tablet 40 milliGRAM(s) Oral before breakfast  polyethylene glycol 3350 17 Gram(s) Oral daily  senna 2 Tablet(s) Oral at bedtime    MEDICATIONS  (PRN):  aluminum hydroxide/magnesium hydroxide/simethicone Suspension 30 milliLiter(s) Oral every 4 hours PRN Dyspepsia  HYDROmorphone   Tablet 2 milliGRAM(s) Oral every 6 hours PRN Moderate Pain (4 - 6)  HYDROmorphone  Injectable 1 milliGRAM(s) IV Push every 4 hours PRN Severe Pain (7 - 10)      HOME MEDICATIONS (as per chart review):  Cardizem 120 mg oral tablet (07-25)  levothyroxine 100 mcg (0.1 mg) oral tablet (07-25)

## 2024-08-27 NOTE — PROGRESS NOTE ADULT - SUBJECTIVE AND OBJECTIVE BOX
Subjective: 59yMale with a pmhx of Cervical radiculopathy    Body mass index [BMI] 36.0-36.9, adult    Body mass index [BMI] 37.0-37.9, adult    Body mass index [BMI] 38.0-38.9, adult    Body mass index [BMI] 39.0-39.9, adult    Body mass index [BMI] 40.0-44.9, adult    Dorsalgia, unspecified    Elevation of levels of liver transaminase levels    Essential (primary) hypertension    Generalized anxiety disorder    Hypothyroidism, unspecified    Low back pain, unspecified    Nonalcoholic steatohepatitis (GALLEGOS)    Obesity, unspecified    Obstructive sleep apnea (adult) (pediatric)    Other malaise    Pain in right hip    Panic disorder [episodic paroxysmal anxiety]    Prediabetes    Simple chronic bronchitis    Unspecified atrial fibrillation    Unspecified cord compression    Weakness    Alcohol history    Has the patient ever used illegal drugs?    Number of children    No pertinent family history in first degree relatives    Family history of colon cancer in father (Father)    Handoff    MEWS Score    Sleep apnea with use of continuous positive airway pressure (CPAP)    Atrial fibrillation, unspecified type    Sciatica    Hypothyroidism    Cervical spondylosis with myelopathy    Cervical spondylosis with myelopathy    Laminectomy, cervical, with foraminotomy or facetectomy, 1 level    Fusion, spine, cervical, 4 levels, posterior approach    No significant past surgical history    H/O colonoscopy    History of endoscopy    History of ear surgery    66599; 21729; 12121; 69703    SysAdmin_VstLnk        POD#8  s/p C1- C4 Posterior Lami and Fusion     Patient seen and examined at bedside.  Pt seated in recliner chair. Pt sts he had a large BM yesterday. Pt also sts he ambulated with PT but needs to do stairs prior to discharge.  c/o incisional site pain and numbness to back of head.      Allergies    Allergy Status Unknown    Intolerances    morphine (Other; Pruritus)      Imaging:    Vital Signs Last 24 Hrs  T(C): 36.7 (26 Aug 2024 23:30), Max: 36.7 (26 Aug 2024 23:30)  T(F): 98.1 (26 Aug 2024 23:30), Max: 98.1 (26 Aug 2024 23:30)  HR: 77 (26 Aug 2024 23:30) (75 - 80)  BP: 163/83 (26 Aug 2024 23:30) (152/68 - 166/81)  BP(mean): --  RR: 18 (26 Aug 2024 23:30) (18 - 18)  SpO2: 96% (26 Aug 2024 23:30) (92% - 96%)      acetaminophen     Tablet .. 975 milliGRAM(s) Oral every 8 hours  aluminum hydroxide/magnesium hydroxide/simethicone Suspension 30 milliLiter(s) Oral every 4 hours PRN  chlorhexidine 2% Cloths 1 Application(s) Topical <User Schedule>  diazepam    Tablet 10 milliGRAM(s) Oral every 8 hours  diltiazem    milliGRAM(s) Oral daily  gabapentin 300 milliGRAM(s) Oral every 8 hours  heparin   Injectable 5000 Unit(s) SubCutaneous every 8 hours  HYDROmorphone   Tablet 2 milliGRAM(s) Oral every 6 hours PRN  HYDROmorphone  Injectable 1 milliGRAM(s) IV Push every 4 hours PRN  levothyroxine 100 MICROGram(s) Oral daily  lidocaine   4% Patch 2 Patch Transdermal daily  ondansetron Injectable 4 milliGRAM(s) IV Push every 6 hours  pantoprazole    Tablet 40 milliGRAM(s) Oral before breakfast  polyethylene glycol 3350 17 Gram(s) Oral daily  senna 2 Tablet(s) Oral at bedtime        08-26-24 @ 07:01  -  08-27-24 @ 07:00  --------------------------------------------------------  IN: 0 mL / OUT: 320 mL / NET: -320 mL        REVIEW OF SYSTEMS    [x ] A ten-point review of systems was otherwise negative except as noted.  [ ] Due to altered mental status/intubation, subjective information were not able to be obtained from the patient. History was obtained, to the extent possible, from review of the chart and collateral sources of information.      Neuro Exam:  AAOX3. Verbal function intact  follows commands  Motor: MAEx4  moving b/l UE with good strength  moving b/l LE with good strength  Sensation: intact and equal       Wound: incision intact, no erythema, no discharge             I&O's Detail    26 Aug 2024 07:01  -  27 Aug 2024 07:00  --------------------------------------------------------  IN:  Total IN: 0 mL    OUT:    Voided (mL): 320 mL  Total OUT: 320 mL    Total NET: -320 mL        I&O's Summary    26 Aug 2024 07:01  -  27 Aug 2024 07:00  --------------------------------------------------------  IN: 0 mL / OUT: 320 mL / NET: -320 mL          Assessment/Plan:   Pt had +BM  PT today- to do stair training  pain control  continue DVT prophylaxis   Anticipate discharge later today after stairs  d/w attg

## 2024-08-27 NOTE — DISCHARGE NOTE PROVIDER - NSDCMRMEDTOKEN_GEN_ALL_CORE_FT
Cardizem 120 mg oral tablet: 1 tab(s) orally once a day  Dilaudid 2 mg oral tablet: 1 tab(s) orally 4 times a day as needed for  severe pain MDD: 4  gabapentin 300 mg oral capsule: 1 cap(s) orally 3 times a day  levothyroxine 100 mcg (0.1 mg) oral tablet: 1 tab(s) orally once a day (in the morning)  methocarbamol 500 mg oral tablet: 1 tab(s) orally 3 times a day as needed for  muscle spasm MDD: 3  polyethylene glycol 3350 oral powder for reconstitution: 17 gram(s) orally once a day  senna leaf extract oral tablet: 2 tab(s) orally once a day (at bedtime)

## 2024-08-27 NOTE — DISCHARGE NOTE NURSING/CASE MANAGEMENT/SOCIAL WORK - PATIENT PORTAL LINK FT
You can access the FollowMyHealth Patient Portal offered by [facility] by registering at the following website: [email or web address]. By joining [facility]’s FollowMyHealth portal, you will also be able to view your health information using other applications (apps) compatible with our system.

## 2024-08-27 NOTE — DISCHARGE NOTE PROVIDER - NSDCFUADDINST_GEN_ALL_CORE_FT
- Upon discharge, you will receive a call from the Neurosurgery office to confirm your follow up appointment for 9/4/24.   -  If you do not get a call, please call [phone] to schedule a follow up with your surgeon in 2 weeks.  - Upon discharge, please call to make a follow up appointment with your primary care provider to discuss your recent hospitalization/operation.  - You may shower as usual. Run water and soap over incision sites and pat dry (no scrubbing). No submerging your incision sites in water (i.e. no swimming or baths) for 2-3 weeks and avoid exposing the area to jets/streams of water.   - You can resume your normal activities as tolerated, but avoid heavy (>15lb.) lifting and strenuous exercise for 4-6 weeks.    - You were prescribed Dilaudid for pain, take these only as needed.  Your pain should subside over the next few days.  While taking narcotic pain medications, you should not drive or operate heavy machinery.   - You were prescribed a muscle relaxer.  Take these as needed for muscle spasms.  - Narcotic pain medicine tends to cause constipation, we recommend taking a stool softener and/or gentle laxative to avoid this problem.  If stools become loose, discontinue stool softener/laxative.    - You should resume all home medications unless otherwise instructed.    - You should continue to use your incentive spirometer 10x per hour at home to exercise your lungs.   - If you experience fevers, chills, increasing abdominal pain, nausea, vomiting, inability to pass stool or gas, bleeding, or any other acute symptoms, please call your doctor and report to the emergency room immediately for further management.

## 2024-08-27 NOTE — DISCHARGE NOTE PROVIDER - HOSPITAL COURSE
This is a 59 y.o. Male with a hx of hypothyroidism, YANIRA (on CPAP), AFIB (on no anticoagulation), and sciatica presents for CERVICAL 1-4 LAMINECTOMY INSTRUMENTATION AND FUSION. He admits he has neck pain x 1.5 years associated with -FROM that radiates down midline of spine. He admits he is prescribed medical marijuana for pain relief. He denies taking any OTC medications for pain relief, trauma, recent falls, confusion, memory loss, stroke, tics, tremors, vision changes, swelling, discoloration, inability to ambulate,  numbness/tingling, urinary incontinence/changes, abdominal pain, and N/V/D. Pt underwent procedure on 8/19/24. OR was uneventful. Pt extubated postop. Pt seen by Physical therapy and deemed cleared for d/c home after ambulating and doing stair training. Pt not a 4A rehab candidate. Pain regimen increased while pt was in house. Pt had issue of constipation but then had large BM on 8/26/24. Rx given for bedside commode.

## 2024-08-27 NOTE — PROGRESS NOTE ADULT - PROVIDER SPECIALTY LIST ADULT
Hospitalist
Hospitalist
Neurosurgery
Neurosurgery
Hospitalist
Hospitalist
Internal Medicine
Neurosurgery
Internal Medicine
Neurosurgery

## 2024-08-27 NOTE — DISCHARGE NOTE PROVIDER - NSDCCPCAREPLAN_GEN_ALL_CORE_FT
PRINCIPAL DISCHARGE DIAGNOSIS  Diagnosis: S/P cervical spinal fusion  Assessment and Plan of Treatment:

## 2024-08-27 NOTE — DISCHARGE NOTE PROVIDER - CARE PROVIDER_API CALL
Morelia Winston  Neurosurgery  [address] [place] [address], [address]  [place] [address]  Phone: [phone]  Fax: [phone]  Follow Up Time:

## 2024-09-04 ENCOUNTER — APPOINTMENT (OUTPATIENT)
Dept: NEUROSURGERY | Facility: CLINIC | Age: 59
End: 2024-09-04
Payer: COMMERCIAL

## 2024-09-04 VITALS — WEIGHT: 310 LBS | BODY MASS INDEX: 41.99 KG/M2 | HEIGHT: 72 IN

## 2024-09-04 DIAGNOSIS — Z09 ENCOUNTER FOR FOLLOW-UP EXAMINATION AFTER COMPLETED TREATMENT FOR CONDITIONS OTHER THAN MALIGNANT NEOPLASM: ICD-10-CM

## 2024-09-04 PROCEDURE — 99024 POSTOP FOLLOW-UP VISIT: CPT

## 2024-09-04 RX ORDER — DIAZEPAM 5 MG/1
5 TABLET ORAL 3 TIMES DAILY
Qty: 30 | Refills: 0 | Status: ACTIVE | COMMUNITY
Start: 2024-09-04 | End: 1900-01-01

## 2024-09-04 RX ORDER — LIDOCAINE 5% 700 MG/1
5 PATCH TOPICAL
Qty: 30 | Refills: 0 | Status: ACTIVE | COMMUNITY
Start: 2024-09-04 | End: 1900-01-01

## 2024-09-04 RX ORDER — GABAPENTIN 400 MG/1
400 CAPSULE ORAL 3 TIMES DAILY
Qty: 90 | Refills: 1 | Status: ACTIVE | COMMUNITY
Start: 2024-09-04 | End: 1900-01-01

## 2024-09-04 RX ORDER — HYDROMORPHONE HYDROCHLORIDE 2 MG/1
2 TABLET ORAL EVERY 8 HOURS
Qty: 21 | Refills: 0 | Status: ACTIVE | COMMUNITY
Start: 2024-09-04 | End: 1900-01-01

## 2024-09-04 NOTE — REASON FOR VISIT
[Spouse] : spouse [de-identified] : Posterior C1-C4 laminectomy and fusion [de-identified] : 8/19/24

## 2024-09-04 NOTE — REASON FOR VISIT
[Spouse] : spouse [de-identified] : Posterior C1-C4 laminectomy and fusion [de-identified] : 8/19/24

## 2024-09-04 NOTE — ASSESSMENT
[FreeTextEntry1] : This is a 59-year-old male who presents for an initial postoperative encounter status post C1-4 posterior laminectomy with fusion.  Patient reports considerable myofascial pain which is to be expected.  I will optimize his medication regimen in order to provide superior pain control.  Additionally, he would benefit from pain management input with regards to future medication management as we expect his regimen to continue for approximately 6 weeks.  Additionally, he would benefit from interventional treatments such as cervical trigger point injections.  X-ray cervical AP laterals to be completed over the course of the next month.  I will have him return in 4 weeks for his second postoperative encounter and he is aware to contact me with any questions or concerns in the interim.  Patient and wife expressed clear understanding as to her future treatment course.

## 2024-09-04 NOTE — REASON FOR VISIT
[Spouse] : spouse [de-identified] : Posterior C1-C4 laminectomy and fusion [de-identified] : 8/19/24

## 2024-09-04 NOTE — HISTORY OF PRESENT ILLNESS
[FreeTextEntry1] : This is a 59-year-old gentleman who presents for an initial postoperative encounter status post C1-4 laminectomy with posterior fusion.  As expected he is experiencing heightened myofascial pain within the posterior cervical region.  Pain radiates into the occipital portion of his head with cervicogenic headaches.  Additionally, he reports bilateral trapezius tenderness along with muscle strain.  Continued radiating pain noted into the bilateral upper extremities.  He was discharged on a pain regimen which included Dilaudid.  He finds little to no relief with the preparation despite consuming a 3 or more times per day.  He does not have pain management outpatient follow-up and seeks a new provider recommendation at this time.  Gabapentin continued at 300 mg up to 3 times per day in addition with methocarbamol 500 mg p.o. 3 times daily.  The total regimen provides little to no benefit and he expresses considerable frustration at this time.  WOUND: Area of the posterior cervical region inspected.  There is evidence of Dermabond glue overlying the wound site with visible sutures.  I have applied topical alcohol swabs to the region to breakdown the Dermabond and revealed the sutures for adequate removal.  Patient has tolerated the procedure quite well.  Wound is well-healed without evidence of erythema, edema, warmth, tenderness.  There is no evidence of active discharge.  No evidence of wound dehiscence.  Patient is ambulatory with an assistive device.

## 2024-09-05 DIAGNOSIS — I48.0 PAROXYSMAL ATRIAL FIBRILLATION: ICD-10-CM

## 2024-09-05 DIAGNOSIS — M48.8X2 OTHER SPECIFIED SPONDYLOPATHIES, CERVICAL REGION: ICD-10-CM

## 2024-09-05 DIAGNOSIS — K59.03 DRUG INDUCED CONSTIPATION: ICD-10-CM

## 2024-09-05 DIAGNOSIS — M48.02 SPINAL STENOSIS, CERVICAL REGION: ICD-10-CM

## 2024-09-05 DIAGNOSIS — Z79.899 OTHER LONG TERM (CURRENT) DRUG THERAPY: ICD-10-CM

## 2024-09-05 DIAGNOSIS — D69.6 THROMBOCYTOPENIA, UNSPECIFIED: ICD-10-CM

## 2024-09-05 DIAGNOSIS — E66.01 MORBID (SEVERE) OBESITY DUE TO EXCESS CALORIES: ICD-10-CM

## 2024-09-05 DIAGNOSIS — T40.2X5A ADVERSE EFFECT OF OTHER OPIOIDS, INITIAL ENCOUNTER: ICD-10-CM

## 2024-09-05 DIAGNOSIS — E03.9 HYPOTHYROIDISM, UNSPECIFIED: ICD-10-CM

## 2024-09-05 DIAGNOSIS — R73.03 PREDIABETES: ICD-10-CM

## 2024-09-05 DIAGNOSIS — M47.12 OTHER SPONDYLOSIS WITH MYELOPATHY, CERVICAL REGION: ICD-10-CM

## 2024-09-05 DIAGNOSIS — I10 ESSENTIAL (PRIMARY) HYPERTENSION: ICD-10-CM

## 2024-09-05 DIAGNOSIS — Z79.890 HORMONE REPLACEMENT THERAPY: ICD-10-CM

## 2024-09-05 DIAGNOSIS — F17.210 NICOTINE DEPENDENCE, CIGARETTES, UNCOMPLICATED: ICD-10-CM

## 2024-09-05 DIAGNOSIS — Z99.89 DEPENDENCE ON OTHER ENABLING MACHINES AND DEVICES: ICD-10-CM

## 2024-09-05 DIAGNOSIS — G47.33 OBSTRUCTIVE SLEEP APNEA (ADULT) (PEDIATRIC): ICD-10-CM

## 2024-09-05 DIAGNOSIS — Z88.5 ALLERGY STATUS TO NARCOTIC AGENT: ICD-10-CM

## 2024-10-02 ENCOUNTER — APPOINTMENT (OUTPATIENT)
Dept: NEUROSURGERY | Facility: CLINIC | Age: 59
End: 2024-10-02
Payer: COMMERCIAL

## 2024-10-02 ENCOUNTER — RESULT REVIEW (OUTPATIENT)
Age: 59
End: 2024-10-02

## 2024-10-02 VITALS — HEIGHT: 72 IN | BODY MASS INDEX: 41.99 KG/M2 | WEIGHT: 310 LBS

## 2024-10-02 DIAGNOSIS — M25.78 OSTEOPHYTE, VERTEBRAE: ICD-10-CM

## 2024-10-02 DIAGNOSIS — M48.8X2 OTHER SPECIFIED SPONDYLOPATHIES, CERVICAL REGION: ICD-10-CM

## 2024-10-02 DIAGNOSIS — M54.12 RADICULOPATHY, CERVICAL REGION: ICD-10-CM

## 2024-10-02 DIAGNOSIS — M48.02 SPINAL STENOSIS, CERVICAL REGION: ICD-10-CM

## 2024-10-02 DIAGNOSIS — Z09 ENCOUNTER FOR FOLLOW-UP EXAMINATION AFTER COMPLETED TREATMENT FOR CONDITIONS OTHER THAN MALIGNANT NEOPLASM: ICD-10-CM

## 2024-10-02 PROCEDURE — 99024 POSTOP FOLLOW-UP VISIT: CPT

## 2024-10-02 NOTE — ASSESSMENT
[FreeTextEntry1] : This is a 58 yo M who presents for neurosurgical postoperative assessment status post C1-4 laminectomy with fusion.  Patient continues to struggle with myofascial pain involving the cervical region for which I have reiterated the indication for cervical TPI series.  Additionally, patient is to complete the recommended x-ray to confirm hardware placement.  He will initial physical therapy 2-3 times/week along with a home PT program.  He will return to the office in 4 weeks for additional care/treatment efforts and will contact us with any concerns in the interim.  VEGA Farr MD
(2) very limited

## 2024-10-02 NOTE — HISTORY OF PRESENT ILLNESS
[FreeTextEntry1] : This is a 58 yo M who presents for neurosurgical revisit, he is s/p C1-4 posterior laminectomy with fusion. He notes persistent posterior neck pain with tenderness into the bilateral trapezius regions. Numbness noted at the occipital region with pain at the top of his head. At his last encounter, I had encouraged him to trial cervical/thoracic trigger point injections along with an X-ray C spine AP/Lat which was not completed. He has since received medication management to alleviate the burden of his pain but continues to struggle with myofascial pain and tenderness.  IMAGING: X-ray- not completed; will be done today as per patient.  WOUND: Area of the posterior cervical region inspected.  Wound well-healed without evidence of erythema, edema, warmth, tenderness.  No evidence of dehiscence. No active wound discharge noted.

## 2024-11-13 ENCOUNTER — APPOINTMENT (OUTPATIENT)
Dept: NEUROSURGERY | Facility: CLINIC | Age: 59
End: 2024-11-13
Payer: COMMERCIAL

## 2024-11-13 VITALS — HEIGHT: 72 IN | BODY MASS INDEX: 41.99 KG/M2 | WEIGHT: 310 LBS

## 2024-11-13 DIAGNOSIS — M48.02 SPINAL STENOSIS, CERVICAL REGION: ICD-10-CM

## 2024-11-13 DIAGNOSIS — M48.8X2 OTHER SPECIFIED SPONDYLOPATHIES, CERVICAL REGION: ICD-10-CM

## 2024-11-13 DIAGNOSIS — M25.78 OSTEOPHYTE, VERTEBRAE: ICD-10-CM

## 2024-11-13 DIAGNOSIS — Z09 ENCOUNTER FOR FOLLOW-UP EXAMINATION AFTER COMPLETED TREATMENT FOR CONDITIONS OTHER THAN MALIGNANT NEOPLASM: ICD-10-CM

## 2024-11-13 PROCEDURE — 99024 POSTOP FOLLOW-UP VISIT: CPT

## 2025-01-06 ENCOUNTER — APPOINTMENT (OUTPATIENT)
Dept: NEUROSURGERY | Facility: CLINIC | Age: 60
End: 2025-01-06
Payer: COMMERCIAL

## 2025-01-06 DIAGNOSIS — M54.2 CERVICALGIA: ICD-10-CM

## 2025-01-06 PROCEDURE — 99211 OFF/OP EST MAY X REQ PHY/QHP: CPT

## 2025-04-09 ENCOUNTER — APPOINTMENT (OUTPATIENT)
Dept: NEUROSURGERY | Facility: CLINIC | Age: 60
End: 2025-04-09
Payer: COMMERCIAL

## 2025-04-09 ENCOUNTER — NON-APPOINTMENT (OUTPATIENT)
Age: 60
End: 2025-04-09

## 2025-04-09 VITALS — WEIGHT: 310 LBS | HEIGHT: 72 IN | BODY MASS INDEX: 41.99 KG/M2

## 2025-04-09 DIAGNOSIS — M54.2 CERVICALGIA: ICD-10-CM

## 2025-04-09 PROCEDURE — 99212 OFFICE O/P EST SF 10 MIN: CPT

## 2025-04-09 RX ORDER — PANTOPRAZOLE SODIUM 20 MG/1
TABLET, DELAYED RELEASE ORAL
Refills: 0 | Status: ACTIVE | COMMUNITY

## 2025-07-09 ENCOUNTER — APPOINTMENT (OUTPATIENT)
Dept: NEUROSURGERY | Facility: CLINIC | Age: 60
End: 2025-07-09

## 2025-09-10 ENCOUNTER — TRANSCRIPTION ENCOUNTER (OUTPATIENT)
Age: 60
End: 2025-09-10